# Patient Record
Sex: FEMALE | Race: BLACK OR AFRICAN AMERICAN | NOT HISPANIC OR LATINO | Employment: FULL TIME | URBAN - METROPOLITAN AREA
[De-identification: names, ages, dates, MRNs, and addresses within clinical notes are randomized per-mention and may not be internally consistent; named-entity substitution may affect disease eponyms.]

---
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
## 2023-06-14 ENCOUNTER — OFFICE VISIT (OUTPATIENT)
Dept: OBSTETRICS AND GYNECOLOGY | Facility: CLINIC | Age: 23
End: 2023-06-14
Payer: MEDICAID

## 2023-06-14 VITALS
SYSTOLIC BLOOD PRESSURE: 110 MMHG | WEIGHT: 145.06 LBS | BODY MASS INDEX: 26.53 KG/M2 | DIASTOLIC BLOOD PRESSURE: 78 MMHG

## 2023-06-14 DIAGNOSIS — Z01.419 WELL WOMAN EXAM WITH ROUTINE GYNECOLOGICAL EXAM: Primary | ICD-10-CM

## 2023-06-14 DIAGNOSIS — Z12.4 ENCOUNTER FOR PAPANICOLAOU SMEAR FOR CERVICAL CANCER SCREENING: ICD-10-CM

## 2023-06-14 DIAGNOSIS — Z30.42 SURVEILLANCE FOR DEPO-PROVERA CONTRACEPTION: ICD-10-CM

## 2023-06-14 PROCEDURE — 88175 CYTOPATH C/V AUTO FLUID REDO: CPT | Performed by: OBSTETRICS & GYNECOLOGY

## 2023-06-14 PROCEDURE — 3008F BODY MASS INDEX DOCD: CPT | Mod: CPTII,,, | Performed by: OBSTETRICS & GYNECOLOGY

## 2023-06-14 PROCEDURE — 3074F PR MOST RECENT SYSTOLIC BLOOD PRESSURE < 130 MM HG: ICD-10-PCS | Mod: CPTII,,, | Performed by: OBSTETRICS & GYNECOLOGY

## 2023-06-14 PROCEDURE — 99211 OFF/OP EST MAY X REQ PHY/QHP: CPT | Mod: PBBFAC | Performed by: OBSTETRICS & GYNECOLOGY

## 2023-06-14 PROCEDURE — 99999 PR PBB SHADOW E&M-EST. PATIENT-LVL I: CPT | Mod: PBBFAC,,, | Performed by: OBSTETRICS & GYNECOLOGY

## 2023-06-14 PROCEDURE — 3074F SYST BP LT 130 MM HG: CPT | Mod: CPTII,,, | Performed by: OBSTETRICS & GYNECOLOGY

## 2023-06-14 PROCEDURE — 3008F PR BODY MASS INDEX (BMI) DOCUMENTED: ICD-10-PCS | Mod: CPTII,,, | Performed by: OBSTETRICS & GYNECOLOGY

## 2023-06-14 PROCEDURE — 99999 PR PBB SHADOW E&M-EST. PATIENT-LVL I: ICD-10-PCS | Mod: PBBFAC,,, | Performed by: OBSTETRICS & GYNECOLOGY

## 2023-06-14 PROCEDURE — 99395 PR PREVENTIVE VISIT,EST,18-39: ICD-10-PCS | Mod: S$PBB,,, | Performed by: OBSTETRICS & GYNECOLOGY

## 2023-06-14 PROCEDURE — 99395 PREV VISIT EST AGE 18-39: CPT | Mod: S$PBB,,, | Performed by: OBSTETRICS & GYNECOLOGY

## 2023-06-14 PROCEDURE — 3078F PR MOST RECENT DIASTOLIC BLOOD PRESSURE < 80 MM HG: ICD-10-PCS | Mod: CPTII,,, | Performed by: OBSTETRICS & GYNECOLOGY

## 2023-06-14 PROCEDURE — 3078F DIAST BP <80 MM HG: CPT | Mod: CPTII,,, | Performed by: OBSTETRICS & GYNECOLOGY

## 2023-06-14 RX ORDER — MEDROXYPROGESTERONE ACETATE 150 MG/ML
150 INJECTION, SUSPENSION INTRAMUSCULAR
Status: COMPLETED | OUTPATIENT
Start: 2023-06-15 | End: 2024-03-13

## 2023-06-14 NOTE — PROGRESS NOTES
SUBJECTIVE:   Rosemary Zee is a 22 y.o. female   for annual well woman exam. Patient's last menstrual period was 2023 (exact date)..      Contraception: depo provera since 2023, still getting period sometimes  Would like to continue  Last injection was 23 in [place]    In monogamous relationship, using condoms    Past Medical History:   Diagnosis Date    Diabetes mellitus      No past surgical history on file.  Social History     Socioeconomic History    Marital status: Single   Tobacco Use    Smoking status: Passive Smoke Exposure - Never Smoker    Smokeless tobacco: Never   Substance and Sexual Activity    Alcohol use: No    Drug use: No    Sexual activity: Yes   Social History Narrative    Lives at home with mom and mom's boyfriend. Has siblings that do not live with her, but they are healthy per report.    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     Family History   Problem Relation Age of Onset    Diabetes Mother     Stroke Mother     Heart attacks under age 50 Mother     Diabetes Maternal Grandfather     Diabetes Paternal Grandmother     Arrhythmia Neg Hx     Early death Neg Hx     Pacemaker/defibrilator Neg Hx     Congenital heart disease Neg Hx      OB History    Para Term  AB Living   0 0 0 0 0     SAB IAB Ectopic Multiple Live Births   0 0 0       Obstetric Comments   Gynhx: reg/7   Sexually active @ age 20, one LTP         Current Outpatient Medications   Medication Sig Dispense Refill    acetone, urine, test (KETOSTIX) Strp CHECK URINE KETONES WHEN BLOOD SUGAR IS GREATER THAN 300 (Patient not taking: Reported on 3/31/2021) 100 strip 30    alcohol swabs PadM Use as directed prior to insulin injection or blood glucose check (Patient not taking: Reported on 3/31/2021) 300 each 6    BAQSIMI 3 mg/actuation Spry 3 mg by Nasal route as needed (unconscious and low blood sugar or having a seizure). (Patient not taking: Reported on 3/31/2021)  2 each 2    blood sugar diagnostic (ACCU-CHEK GUIDE TEST STRIPS) Strp Check Bg 8 times/day (Patient not taking: Reported on 3/31/2021) 250 strip 4    blood-glucose meter (ACCU-CHEK GUIDE GLUCOSE METER) Misc Use as directed (Patient not taking: Reported on 3/31/2021) 1 each 1    blood-glucose sensor (DEXCOM G6 SENSOR) Marlen For use with continuous glucose monitoring system.  Change sensor every 10 days or as needed if sensor fails or comes off. (Patient not taking: Reported on 3/31/2021) 3 each 12    blood-glucose transmitter (DEXCOM G6 TRANSMITTER) Marlen Use with Dexcom sensor for continuous glucose monitoring.  Change as indicated when battery life ends - up to 90 days. (Patient not taking: Reported on 3/31/2021) 2 Device 4    blood-glucose transmitter (DEXCOM G6 TRANSMITTER) Marlen For use with continuous glucose monitoring system.  Change at the end of battery life; last up to 90 days. (Patient not taking: Reported on 3/31/2021) 2 each 4    fluconazole (DIFLUCAN) 150 MG Tab Take one tablet once, may repeat in 3 days if no improvement 2 tablet 0    FLUoxetine (PROZAC) 10 MG capsule Take 1 capsule (10 mg total) by mouth once daily. 30 capsule 11    glucose 4 GM chewable tablet Take 4 tablets (16 g total) by mouth as needed for Low blood sugar. 150 tablet 4    ibuprofen (ADVIL,MOTRIN) 600 MG tablet Take 1 tablet (600 mg total) by mouth every 6 (six) hours as needed for Pain. (Patient not taking: Reported on 10/20/2021) 20 tablet 0    insulin aspart U-100 (NOVOLOG U-100 INSULIN ASPART) 100 unit/mL injection Use in insulin pump in divided doses, fill pump with 300 units every 3 days 30 mL 4    insulin degludec (TRESIBA FLEXTOUCH U-100) 100 unit/mL (3 mL) InPn Starting dose 16 units daily under the skin;titrate as directed by physician (Patient not taking: Reported on 10/20/2021) 15 mL 4    lancets (ACCU-CHEK FASTCLIX LANCING DEV) Misc Use as directed to test blood glucose up to 6 times a day (Patient not taking: Reported  "on 3/31/2021) 200 each 4    pen needle, diabetic (BD ULTRA-FINE SHIRLEY PEN NEEDLE) 32 gauge x 5/32" Ndle USE UP TO 7 TIMES PER DAY (Patient not taking: Reported on 3/31/2021) 200 each 4    urine glucose-ketones test (KETO-DIASTIX) Strp Check urine ketones when BG>300 (Patient not taking: Reported on 3/31/2021) 100 strip 3     No current facility-administered medications for this visit.     Allergies: Patient has no known allergies.       ROS:  GENERAL: Denies weight gain or weight loss. Feeling well overall.   SKIN: Denies rash or lesions.   HEAD: Denies head injury or headache.   NODES: Denies enlarged lymph nodes.   CHEST: Denies chest pain or shortness of breath.   CARDIOVASCULAR: Denies palpitations or left sided chest pain.   ABDOMEN: No abdominal pain, constipation, diarrhea, nausea, vomiting or rectal bleeding.   URINARY: No frequency, dysuria, hematuria, or burning on urination.  REPRODUCTIVE: Denies vaginal discharge, abnormal vaginal bleeding, lesions, pelvic pain  BREASTS: The patient performs breast self-examination and denies pain, lumps, or nipple discharge.   HEMATOLOGIC: No easy bruisability or excessive bleeding.  MUSCULOSKELETAL: Denies joint pain or swelling.   NEUROLOGIC: Denies syncope or weakness.   PSYCHIATRIC: Denies depression, anxiety or mood swings.      OBJECTIVE:   /78   Wt 65.8 kg (145 lb 1 oz)   LMP 06/07/2023 (Exact Date)   BMI 26.53 kg/m²   The patient appears well, alert, oriented x 3, in no distress.  PSYCH:  Normal, full range of affect  NECK: negative, no thyromegaly, trachea midline  SKIN: normal, good color, good turgor and no acne, striae, hirsutism  BREAST EXAM: breasts appear normal, no suspicious masses, no skin or nipple changes or axillary nodes  ABDOMEN: soft, non-tender; bowel sounds normal; no masses,  no organomegaly and no hernias, masses, or hepatosplenomegaly  GENITALIA: normal external genitalia, no erythema, no discharge  BLADDER: soft  URETHRA: normal " appearing urethra with no masses, tenderness or lesions and normal urethra, normal urethral meatus  VAGINA: Normal  CERVIX: no lesions or cervical motion tenderness  UTERUS: normal size, contour, position, consistency, mobility, non-tender  ADNEXA: no mass, fullness, tenderness      ASSESSMENT:   1. Health maintenance  -pap done. Discussed ASCCP guideline screening every 3 - 5 years.   -declined std testing  -counseled on exercise and healthy diet,   -bone health:  Discussed Vitamin D and Calcium supplementation, weight bearing exercises  2.  Discussed safety at home/school/work, healthy and balanced diet, sleep hygiene, as well as violence/weapons exposure.   3.  Next depo provera scheduled in 2 weeks

## 2023-06-19 ENCOUNTER — TELEPHONE (OUTPATIENT)
Dept: OBSTETRICS AND GYNECOLOGY | Facility: CLINIC | Age: 23
End: 2023-06-19
Payer: COMMERCIAL

## 2023-06-19 NOTE — TELEPHONE ENCOUNTER
Left MessageCommunicated - LVM for pt to call and reschedule her injection as i will be out of the office that day she needs to be scheduled on 6/28/2023 as this is her 12 weeks thank you

## 2023-06-27 ENCOUNTER — CLINICAL SUPPORT (OUTPATIENT)
Dept: OBSTETRICS AND GYNECOLOGY | Facility: CLINIC | Age: 23
End: 2023-06-27
Payer: COMMERCIAL

## 2023-06-27 VITALS — BODY MASS INDEX: 25.63 KG/M2 | WEIGHT: 140.13 LBS

## 2023-06-27 DIAGNOSIS — Z30.42 ENCOUNTER FOR MANAGEMENT AND INJECTION OF DEPO-PROVERA: Primary | ICD-10-CM

## 2023-06-27 PROCEDURE — 99999 PR PBB SHADOW E&M-EST. PATIENT-LVL III: CPT | Mod: PBBFAC,,,

## 2023-06-27 PROCEDURE — 99999 PR PBB SHADOW E&M-EST. PATIENT-LVL III: ICD-10-PCS | Mod: PBBFAC,,,

## 2023-06-27 PROCEDURE — 96372 THER/PROPH/DIAG INJ SC/IM: CPT | Mod: PBBFAC

## 2023-06-27 RX ADMIN — MEDROXYPROGESTERONE ACETATE 150 MG: 150 INJECTION, SUSPENSION INTRAMUSCULAR at 08:06

## 2023-06-27 NOTE — PROGRESS NOTES
Depo-provera injection administered without difficutly. Pt instructed to sit in waiting room for 15 minutes to monitor for s/s of adverse reaction. Next appointment made, stressed importance of staying within chacho period. Pt verb understanding.

## 2023-09-20 ENCOUNTER — HOSPITAL ENCOUNTER (EMERGENCY)
Facility: HOSPITAL | Age: 23
Discharge: HOME | End: 2023-09-20
Attending: EMERGENCY MEDICINE
Payer: MEDICAID

## 2023-09-20 VITALS
SYSTOLIC BLOOD PRESSURE: 123 MMHG | WEIGHT: 155 LBS | HEIGHT: 70 IN | HEART RATE: 80 BPM | DIASTOLIC BLOOD PRESSURE: 81 MMHG | TEMPERATURE: 98 F | RESPIRATION RATE: 18 BRPM | BODY MASS INDEX: 22.19 KG/M2 | OXYGEN SATURATION: 100 %

## 2023-09-20 DIAGNOSIS — R09.1 PLEURISY: Primary | ICD-10-CM

## 2023-09-20 DIAGNOSIS — R07.9 CHEST PAIN: ICD-10-CM

## 2023-09-20 DIAGNOSIS — E10.65 TYPE 1 DIABETES MELLITUS WITH HYPERGLYCEMIA: ICD-10-CM

## 2023-09-20 LAB
ALBUMIN SERPL BCP-MCNC: 4.1 G/DL (ref 3.5–5.2)
ALP SERPL-CCNC: 73 U/L (ref 55–135)
ALT SERPL W/O P-5'-P-CCNC: 14 U/L (ref 10–44)
ANION GAP SERPL CALC-SCNC: 10 MMOL/L (ref 8–16)
AST SERPL-CCNC: 13 U/L (ref 10–40)
BASOPHILS # BLD AUTO: 0.02 K/UL (ref 0–0.2)
BASOPHILS NFR BLD: 0.4 % (ref 0–1.9)
BILIRUB SERPL-MCNC: 0.3 MG/DL (ref 0.1–1)
BNP SERPL-MCNC: 12 PG/ML (ref 0–99)
BUN SERPL-MCNC: 9 MG/DL (ref 6–20)
CALCIUM SERPL-MCNC: 9.9 MG/DL (ref 8.7–10.5)
CHLORIDE SERPL-SCNC: 101 MMOL/L (ref 95–110)
CO2 SERPL-SCNC: 22 MMOL/L (ref 23–29)
CREAT SERPL-MCNC: 1.1 MG/DL (ref 0.5–1.4)
D DIMER PPP IA.FEU-MCNC: <0.19 MG/L FEU
DIFFERENTIAL METHOD: ABNORMAL
EOSINOPHIL # BLD AUTO: 0.1 K/UL (ref 0–0.5)
EOSINOPHIL NFR BLD: 2.2 % (ref 0–8)
ERYTHROCYTE [DISTWIDTH] IN BLOOD BY AUTOMATED COUNT: 11.8 % (ref 11.5–14.5)
EST. GFR  (NO RACE VARIABLE): >60 ML/MIN/1.73 M^2
GLUCOSE SERPL-MCNC: 494 MG/DL (ref 70–110)
HCT VFR BLD AUTO: 36.3 % (ref 37–48.5)
HGB BLD-MCNC: 12.6 G/DL (ref 12–16)
IMM GRANULOCYTES # BLD AUTO: 0 K/UL (ref 0–0.04)
IMM GRANULOCYTES NFR BLD AUTO: 0 % (ref 0–0.5)
LYMPHOCYTES # BLD AUTO: 2 K/UL (ref 1–4.8)
LYMPHOCYTES NFR BLD: 40.2 % (ref 18–48)
MCH RBC QN AUTO: 27.6 PG (ref 27–31)
MCHC RBC AUTO-ENTMCNC: 34.7 G/DL (ref 32–36)
MCV RBC AUTO: 80 FL (ref 82–98)
MONOCYTES # BLD AUTO: 0.3 K/UL (ref 0.3–1)
MONOCYTES NFR BLD: 5.9 % (ref 4–15)
NEUTROPHILS # BLD AUTO: 2.6 K/UL (ref 1.8–7.7)
NEUTROPHILS NFR BLD: 51.3 % (ref 38–73)
NRBC BLD-RTO: 0 /100 WBC
PLATELET # BLD AUTO: 288 K/UL (ref 150–450)
PMV BLD AUTO: 10.6 FL (ref 9.2–12.9)
POCT GLUCOSE: 335 MG/DL (ref 70–110)
POTASSIUM SERPL-SCNC: 4.2 MMOL/L (ref 3.5–5.1)
PROT SERPL-MCNC: 7.6 G/DL (ref 6–8.4)
RBC # BLD AUTO: 4.56 M/UL (ref 4–5.4)
SODIUM SERPL-SCNC: 133 MMOL/L (ref 136–145)
TROPONIN I SERPL DL<=0.01 NG/ML-MCNC: <0.006 NG/ML (ref 0–0.03)
WBC # BLD AUTO: 5.08 K/UL (ref 3.9–12.7)

## 2023-09-20 PROCEDURE — 93010 EKG 12-LEAD: ICD-10-PCS | Mod: ,,, | Performed by: INTERNAL MEDICINE

## 2023-09-20 PROCEDURE — 93005 ELECTROCARDIOGRAM TRACING: CPT

## 2023-09-20 PROCEDURE — 80053 COMPREHEN METABOLIC PANEL: CPT | Performed by: NURSE PRACTITIONER

## 2023-09-20 PROCEDURE — 84484 ASSAY OF TROPONIN QUANT: CPT | Performed by: NURSE PRACTITIONER

## 2023-09-20 PROCEDURE — 93010 ELECTROCARDIOGRAM REPORT: CPT | Mod: ,,, | Performed by: INTERNAL MEDICINE

## 2023-09-20 PROCEDURE — 99285 EMERGENCY DEPT VISIT HI MDM: CPT | Mod: 25

## 2023-09-20 PROCEDURE — 85025 COMPLETE CBC W/AUTO DIFF WBC: CPT | Performed by: NURSE PRACTITIONER

## 2023-09-20 PROCEDURE — 83880 ASSAY OF NATRIURETIC PEPTIDE: CPT | Performed by: NURSE PRACTITIONER

## 2023-09-20 PROCEDURE — 96374 THER/PROPH/DIAG INJ IV PUSH: CPT

## 2023-09-20 PROCEDURE — 25000003 PHARM REV CODE 250: Performed by: NURSE PRACTITIONER

## 2023-09-20 PROCEDURE — 63600175 PHARM REV CODE 636 W HCPCS: Performed by: NURSE PRACTITIONER

## 2023-09-20 PROCEDURE — 85379 FIBRIN DEGRADATION QUANT: CPT | Performed by: NURSE PRACTITIONER

## 2023-09-20 PROCEDURE — 82962 GLUCOSE BLOOD TEST: CPT

## 2023-09-20 RX ORDER — KETOROLAC TROMETHAMINE 30 MG/ML
15 INJECTION, SOLUTION INTRAMUSCULAR; INTRAVENOUS
Status: COMPLETED | OUTPATIENT
Start: 2023-09-20 | End: 2023-09-20

## 2023-09-20 RX ORDER — ORPHENADRINE CITRATE 30 MG/ML
30 INJECTION INTRAMUSCULAR; INTRAVENOUS
Status: DISCONTINUED | OUTPATIENT
Start: 2023-09-20 | End: 2023-09-20 | Stop reason: HOSPADM

## 2023-09-20 RX ORDER — KETOROLAC TROMETHAMINE 10 MG/1
10 TABLET, FILM COATED ORAL EVERY 6 HOURS
Qty: 20 TABLET | Refills: 0 | Status: SHIPPED | OUTPATIENT
Start: 2023-09-20 | End: 2023-09-25

## 2023-09-20 RX ADMIN — KETOROLAC TROMETHAMINE 15 MG: 30 INJECTION, SOLUTION INTRAMUSCULAR; INTRAVENOUS at 04:09

## 2023-09-20 RX ADMIN — SODIUM CHLORIDE 1000 ML: 9 INJECTION, SOLUTION INTRAVENOUS at 04:09

## 2023-09-20 NOTE — DISCHARGE INSTRUCTIONS
You were seen and evaluated in the ER today.  Your workup while you were here is reassuring for no acute causes of your symptoms.  We are going to treat you with medications help with pain.  We have also placed a referral for endocrinology follow-up.  Please call and schedule an appointment.  Please follow-up with your PCP as needed.  Please return to the ED for any worsening symptoms such as chest pain, shortness of breath, fever not controlled with Tylenol or ibuprofen or uncontrolled pain.      Our goal in the emergency department is to always give you outstanding care and exceptional service. You may receive a survey by mail or e-mail in the next week regarding your experience in our ED. We would greatly appreciate your completing and returning the survey. Your feedback provides us with a way to recognize our staff who give very good care and it helps us learn how to improve when your experience was below our aspiration of excellence.

## 2023-09-20 NOTE — ED PROVIDER NOTES
"Source of History:  Patient, chart    Chief complaint:  Chest Pain (Pt reports intermittent chest pain radiation to back, that started earlier today at work. Pain 7/10. Worse on exertion)      HPI:  Rosemary Zee is a 23 y.o. female with medical history of diabetes presenting with of chest pain that began late last night.  Patient describes the pain as a stabbing pain that radiates from her chest to her back.  Patient states pain is worse with exertion but not reproducible.  Patient also was noncompliant with her diabetes.  Patient does not check sugars and is unsure the last time she used any insulin.  Patient states I need a new doctor.  Patient denies any fever, chills or sick contacts.  Patient does work at [employer].    This is the extent to the patients complaints today here in the emergency department.    ROS: As per HPI and below:    Constitutional: No fever.  No chills.  Eyes: No visual changes.  ENT: No sore throat. No ear pain    Cardiovascular:  Positive for chest pain.  Respiratory: No shortness of breath.  GI: No abdominal pain.  No nausea or vomiting.  Genitourinary: No abnormal urination.  Neurologic: No headache. No focal weakness.  No numbness.  MSK: no back pain.  Integument: No rashes or lesions.  Hematologic: No easy bruising.  Endocrine: No excessive thirst or urination.    Review of patient's allergies indicates:  No Known Allergies    PMH:  As per HPI and below:  Past Medical History:   Diagnosis Date    Diabetes mellitus      No past surgical history on file.    Social History     Tobacco Use    Smoking status: Passive Smoke Exposure - Never Smoker    Smokeless tobacco: Never   Substance Use Topics    Alcohol use: No    Drug use: No       Physical Exam:    /74   Pulse 89   Temp 98 °F (36.7 °C) (Oral)   Resp 18   Ht 5' 10" (1.778 m)   Wt 70.3 kg (155 lb)   LMP 09/11/2023   SpO2 99%   Breastfeeding No   BMI 22.24 kg/m²     Nursing note and vital signs " reviewed.    Constitutional: No acute distress.  Nontoxic  Eyes: No conjunctival injection.Extraocular muscles are intact.  ENT: Oropharynx clear.  Normal phonation.   Cardiovascular: Regular rate and rhythm.  No murmurs. No gallops. No rubs  Respiratory:  Pleuritic chest pain on inspiration.  No chest wall pain to palpation.  Clear to auscultation bilaterally.  Good air movement.  No wheezes.  No rhonchi. No rales. No accessory muscle use..  Abdomen: Soft.  Not distended.  Nontender.  No guarding.  No rebound. Non-peritoneal.  Musculoskeletal: Good range of motion all joints.  No deformities.  Neck supple.  No meningismus.  Skin: No rashes seen.  Good turgor.  No abrasions.  No ecchymoses.  Neurologic: Motor intact.  Sensation intact.  No ataxia. No focal neurological deficits.  Psych: Appropriate, conversant    MDM    Emergent evaluation of a 22 yo female presenting for chest pain radiating to her back since last night.  Patient denies taking anything for his symptoms.  Of note patient outside facility and had a normal EKG and a clear chest x-ray.  Patient states pain has continued throughout the day.  Patient is also diabetic but does not check her sugars.  Patient is unsure the last time she used any insulin.  On exam pt is A&Ox3. VSS. Nonfebrile and nontoxic appearing.  Mucous membranes pink and moist. Tonsils with no redness, erythema or exudates. Breath sounds clear bilaterally.  Pleuritic chest pain on inspiration.  No chest wall pain to palpation.  Abdomen soft and nontender. No rebound or guarding appreciated on exam.   BS WNL.  Pt speaking in full sentences.  Steady gait appreciated. Cap refill < 3 seconds.      History Acquisition   Additional history was acquired from other historians.  Chart    The patient's list of active medical problems, social history, medications, and allergies as documented per RN staff has been reviewed.     Differential Diagnoses   Based on available information and the initial  assessment, the working differential diagnoses considered during this evaluation include but are not limited to pleurisy, costochondritis, viral URI, hyperglycemia, DKA, others.  I considered but do not suspect pulmonary embolism.    I will get labs, hydrate, medicate and reassess.      LABS     Labs Reviewed   CBC W/ AUTO DIFFERENTIAL - Abnormal; Notable for the following components:       Result Value    Hematocrit 36.3 (*)     MCV 80 (*)     All other components within normal limits   COMPREHENSIVE METABOLIC PANEL - Abnormal; Notable for the following components:    Sodium 133 (*)     CO2 22 (*)     Glucose 494 (*)     All other components within normal limits    Narrative:        Glucose critical result(s) called and verbal readback obtained   from Maryjo Marlon by JCT3 09/20/2023 16:56   TROPONIN I   B-TYPE NATRIURETIC PEPTIDE   D DIMER, QUANTITATIVE   POCT GLUCOSE MONITORING CONTINUOUS         Imaging     Imaging Results              X-Ray Chest PA And Lateral (Final result)  Result time 09/20/23 16:36:40      Final result by Son Franklin MD (09/20/23 16:36:40)                   Impression:      No acute chest disease identified.      Electronically signed by: Son Franklin MD  Date:    09/20/2023  Time:    16:36               Narrative:    EXAMINATION:  XR CHEST PA AND LATERAL    CLINICAL HISTORY:  Chest pain, unspecified    TECHNIQUE:  PA and lateral views of the chest were performed.    COMPARISON:  09/19/2023.    FINDINGS:  The cardiac silhouette and superior mediastinal structures are unremarkable. Pulmonary vasculature is within normal limits. The lungs are well aerated and free of focal consolidations. There is no evidence for pneumothorax or pleural effusions. Bony structures are grossly intact.                                      A radiology report was available for my review at the time of the encounter.    EKG        Additional Consideration   All available testing was considered during the  course of this workup.  Comorbidities taken into consideration during the patient's evaluation and treatment include weight, age.    Social determinants of health were taken into consideration during development of our treatment plan.    Medications   orphenadrine injection 30 mg (30 mg Intravenous Not Given 9/20/23 1615)   ketorolac injection 15 mg (15 mg Intravenous Given 9/20/23 1646)   sodium chloride 0.9% bolus 1,000 mL 1,000 mL (1,000 mLs Intravenous New Bag 9/20/23 1621)      ED Course as of 09/20/23 1725   Wed Sep 20, 2023   1659 CBC unremarkable.  No leukocytosis noted.  H&H stable at 12.6 and 36.3.  Troponin negative.  BNP negative.  D-dimer within normal limits.  CMP shows glucose of 494.  Anion gap of 10.  No signs of DKA.   [RZ]   1720 Patient reassessed.  Patient states feeling much better after Toradol.  Patient updated on lab and imaging results.  Advised to follow up with endocrinology for control of her diabetes.  Advised to follow-up with PCP to establish care.  Strict return to ED precautions discussed.  Patient verbalized understanding of this plan of care.  All questions and concerns addressed. [RZ]   1724 Patient is hemodynamically stable, vital signs are normal. Discharge instructions given. Return to ED precautions discussed. Follow up as directed. Pt verbalized understanding of this plan.  Pt is stable for discharge.  [RZ]      ED Course User Index  [RZ] Lexie Eaton NP             CLINICAL IMPRESSION  1. Pleurisy    2. Chest pain    3. Type 1 diabetes mellitus with hyperglycemia         ED Disposition Condition    Discharge Stable            Instruction:  I see no indication of an emergent process beyond that addressed during our encounter but have duly counseled the patient/family regarding the need for prompt follow-up as well as the indications that should prompt immediate return to the emergency room should new or worrisome developments occur.  The patient/family has been provided  with verbal and printed direction regarding our final diagnosis(es) as well as instructions regarding use of OTC and/or Rx medications intended to manage the patient's aforementioned conditions including:  ED Prescriptions       Medication Sig Dispense Start Date End Date Auth. Provider    ketorolac (TORADOL) 10 mg tablet Take 1 tablet (10 mg total) by mouth every 6 (six) hours. for 5 days 20 tablet 9/20/2023 9/25/2023 Lexie Eaton NP          Patient has been advised of following recommended follow-up instructions:  Follow-up Information       Follow up With Specialties Details Why Contact Info Additional Information    [facility] - Endocrinology Endocrinology Schedule an appointment as soon as possible for a visit  As needed [address], [address] [address] [phone] [address] -  Schedule an appointment as soon as possible for a visit  for ED follow up [address] [place] [address]  [phone]             The patient/family communicates understanding of all this information and all remaining questions and concerns were addressed at this time.      The patient's condition did not warrant review of the  and prescription of controlled substances.      This note was created using dictation software.  This program may occasionally mistype words and phrases.         Lexie Eaton NP  09/20/23 8517

## 2023-09-26 ENCOUNTER — CLINICAL SUPPORT (OUTPATIENT)
Dept: OBSTETRICS AND GYNECOLOGY | Facility: CLINIC | Age: 23
End: 2023-09-26
Payer: MEDICAID

## 2023-09-26 DIAGNOSIS — Z30.42 ENCOUNTER FOR MANAGEMENT AND INJECTION OF DEPO-PROVERA: Primary | ICD-10-CM

## 2023-09-26 PROCEDURE — 96372 THER/PROPH/DIAG INJ SC/IM: CPT | Mod: PBBFAC

## 2023-09-26 PROCEDURE — 99999PBSHW PR PBB SHADOW TECHNICAL ONLY FILED TO HB: Mod: PBBFAC,,,

## 2023-09-26 PROCEDURE — 99999PBSHW PR PBB SHADOW TECHNICAL ONLY FILED TO HB: ICD-10-PCS | Mod: PBBFAC,,,

## 2023-09-26 RX ADMIN — MEDROXYPROGESTERONE ACETATE 150 MG: 150 INJECTION, SUSPENSION INTRAMUSCULAR at 09:09

## 2023-10-24 ENCOUNTER — HOSPITAL ENCOUNTER (EMERGENCY)
Facility: HOSPITAL | Age: 23
Discharge: LEFT BEFORE DISCHARGE | End: 2023-10-24
Attending: EMERGENCY MEDICINE
Payer: MEDICAID

## 2023-10-24 VITALS
OXYGEN SATURATION: 98 % | WEIGHT: 143 LBS | HEART RATE: 110 BPM | SYSTOLIC BLOOD PRESSURE: 112 MMHG | BODY MASS INDEX: 26.31 KG/M2 | RESPIRATION RATE: 16 BRPM | TEMPERATURE: 99 F | DIASTOLIC BLOOD PRESSURE: 65 MMHG | HEIGHT: 62 IN

## 2023-10-24 DIAGNOSIS — J30.9 ALLERGIC RHINITIS, UNSPECIFIED SEASONALITY, UNSPECIFIED TRIGGER: ICD-10-CM

## 2023-10-24 DIAGNOSIS — U07.1 COVID-19 VIRUS INFECTION: Primary | ICD-10-CM

## 2023-10-24 DIAGNOSIS — R73.9 HYPERGLYCEMIA: ICD-10-CM

## 2023-10-24 LAB
B-HCG UR QL: NEGATIVE
CTP QC/QA: YES
MOLECULAR STREP A: NEGATIVE
POC MOLECULAR INFLUENZA A AGN: NEGATIVE
POC MOLECULAR INFLUENZA B AGN: NEGATIVE
POCT GLUCOSE: 331 MG/DL (ref 70–110)
SARS-COV-2 RDRP RESP QL NAA+PROBE: POSITIVE

## 2023-10-24 PROCEDURE — 81025 URINE PREGNANCY TEST: CPT | Performed by: NURSE PRACTITIONER

## 2023-10-24 PROCEDURE — 99284 EMERGENCY DEPT VISIT MOD MDM: CPT

## 2023-10-24 PROCEDURE — 82962 GLUCOSE BLOOD TEST: CPT

## 2023-10-24 PROCEDURE — 87804 INFLUENZA ASSAY W/OPTIC: CPT

## 2023-10-24 PROCEDURE — 87635 SARS-COV-2 COVID-19 AMP PRB: CPT | Performed by: NURSE PRACTITIONER

## 2023-10-24 RX ORDER — BENZONATATE 100 MG/1
100 CAPSULE ORAL 3 TIMES DAILY PRN
Qty: 30 CAPSULE | Refills: 0 | Status: SHIPPED | OUTPATIENT
Start: 2023-10-24 | End: 2023-11-03

## 2023-10-24 RX ORDER — DEXTROMETHORPHAN HYDROBROMIDE, GUAIFENESIN 5; 100 MG/5ML; MG/5ML
650 LIQUID ORAL EVERY 8 HOURS
Qty: 20 TABLET | Refills: 0 | Status: SHIPPED | OUTPATIENT
Start: 2023-10-24 | End: 2023-10-31

## 2023-10-24 RX ORDER — FLUTICASONE PROPIONATE 50 MCG
1 SPRAY, SUSPENSION (ML) NASAL 2 TIMES DAILY PRN
Qty: 15 G | Refills: 0 | Status: SHIPPED | OUTPATIENT
Start: 2023-10-24 | End: 2023-11-07

## 2023-10-24 RX ORDER — IBUPROFEN 600 MG/1
600 TABLET ORAL EVERY 6 HOURS PRN
Qty: 20 TABLET | Refills: 0 | Status: SHIPPED | OUTPATIENT
Start: 2023-10-24 | End: 2023-10-29

## 2023-10-24 RX ORDER — LORATADINE 10 MG/1
10 TABLET ORAL DAILY
Qty: 30 TABLET | Refills: 0 | Status: SHIPPED | OUTPATIENT
Start: 2023-10-24 | End: 2024-02-01 | Stop reason: CLARIF

## 2023-10-24 NOTE — Clinical Note
"Rosemary Hernandez "Rosemary Zee was seen and treated in our emergency department on 10/24/2023.     COVID-19 is present in our communities across the state. There is limited testing for COVID at this time, so not all patients can be tested. In this situation, your employee meets the following criteria:    Rosemary Zee has met the criteria for COVID-19 testing and has a POSITIVE result. She can return to work once they are asymptomatic for 24 hours without the use of fever reducing medications AND at least five days from the first positive result. A mask is recommended for 5 days post quarantine.     If you have any questions or concerns, or if I can be of further assistance, please do not hesitate to contact me.    Sincerely,             Tashia Cervantes, VAHE"

## 2023-10-25 NOTE — ED PROVIDER NOTES
Encounter Date: 10/24/2023       History     Chief Complaint   Patient presents with    Sore Throat     PT with sore throat and body aches x 2 days.     23 y.o. female with a PMH of DM who presents to the Emergency Department with complaints of sore throat and body aches for 1 day.  She denies rash, fever, chest pain, SOB, numbness, weakness, tingling, abdominal pain, back pain, dysuria, hematuria, nausea, vomiting, diarrhea, or any other complaints.   She rates the pain as 6/10 and has not taken any medications for the symptoms.  No Alleviating/aggravating factors.                  The history is provided by the patient.     Review of patient's allergies indicates:  No Known Allergies  Past Medical History:   Diagnosis Date    Diabetes mellitus      History reviewed. No pertinent surgical history.  Family History   Problem Relation Age of Onset    Diabetes Mother     Stroke Mother     Heart attacks under age 50 Mother     Diabetes Maternal Grandfather     Diabetes Paternal Grandmother     Arrhythmia Neg Hx     Early death Neg Hx     Pacemaker/defibrilator Neg Hx     Congenital heart disease Neg Hx      Social History     Tobacco Use    Smoking status: Passive Smoke Exposure - Never Smoker    Smokeless tobacco: Never   Substance Use Topics    Alcohol use: No    Drug use: No     Review of Systems   Constitutional:  Negative for chills and fever.   HENT:  Positive for sore throat. Negative for congestion, ear pain, rhinorrhea and trouble swallowing.    Eyes:  Negative for pain, discharge and redness.   Respiratory:  Negative for cough and shortness of breath.    Cardiovascular:  Negative for chest pain.   Gastrointestinal:  Negative for abdominal pain, diarrhea, nausea and vomiting.   Genitourinary:  Negative for decreased urine volume and dysuria.   Musculoskeletal:  Positive for myalgias. Negative for back pain, neck pain and neck stiffness.   Skin:  Negative for rash.   Neurological:  Negative for dizziness,  weakness, light-headedness, numbness and headaches.   Psychiatric/Behavioral:  Negative for confusion.        Physical Exam     Initial Vitals [10/24/23 2024]   BP Pulse Resp Temp SpO2   112/65 110 16 98.8 °F (37.1 °C) 98 %      MAP       --         Physical Exam    Nursing note and vitals reviewed.  Constitutional: Vital signs are normal. She appears well-developed.  Non-toxic appearance. She does not appear ill.   HENT:   Head: Normocephalic and atraumatic.   Right Ear: Tympanic membrane and ear canal normal.   Left Ear: Tympanic membrane and ear canal normal.   Nose: Mucosal edema present.   Mouth/Throat: Uvula is midline, oropharynx is clear and moist and mucous membranes are normal.   Eyes: Conjunctivae are normal.   Neck: No Brudzinski's sign and no Kernig's sign noted.   Normal range of motion.  Cardiovascular:  Normal rate and regular rhythm.           Pulmonary/Chest: Effort normal and breath sounds normal. She exhibits no tenderness.   Abdominal: Abdomen is soft. There is no abdominal tenderness.   Musculoskeletal:      Cervical back: Normal range of motion.     Lymphadenopathy:     She has no cervical adenopathy.   Neurological: She is alert and oriented to person, place, and time. Gait normal. GCS eye subscore is 4. GCS verbal subscore is 5. GCS motor subscore is 6.   Skin: Skin is warm, dry and intact. No rash noted.   Psychiatric: She has a normal mood and affect. Her speech is normal and behavior is normal. Judgment and thought content normal.         ED Course   Procedures  Labs Reviewed   SARS-COV-2 RDRP GENE - Abnormal; Notable for the following components:       Result Value    POC Rapid COVID Positive (*)     All other components within normal limits   POCT GLUCOSE - Abnormal; Notable for the following components:    POCT Glucose 331 (*)     All other components within normal limits   POCT INFLUENZA A/B MOLECULAR   POCT STREP A MOLECULAR   POCT URINE PREGNANCY          Imaging Results    None           Medications - No data to display  Medical Decision Making  This is an urgent evaluation of a 23 y.o. female that presents to the Emergency Department for URI Symptoms for 1 day.  The patient is a non-toxic, afebrile, and well appearing female. On physical exam: Ears: without infection.  Pharynx without infection. Appears well hydrated with moist mucus membranes. Neck soft and supple with no meningeal signs or cervical lymphadenopathy.  Breath sounds are clear and equal bilaterally with no adventitious breath sounds, tachypnea or respiratory distress.  Oxygen saturation is 98% on Room Air, no evidence of hypoxia.     Vital Signs: 112/65, 98.8, 110, 16, 98%   If available, previous records reviewed.   I ordered labs and personally reviewed them.  Labs significant for UPT negative,   Flu: Negative  COVID-19: Positive; COVID Risk Score: 1   Strep: Negative    Patient refused treatment for elevated CBG; will go home and take her medications.  AMA form signed; risks of no treatment discussed with patient; questions answered    Given the above findings, my overall impression is COVID Infection, allergic rhinitis. Given the above findings, I do not think the patient has Flu, OM, OE, strep pharyngitis, meningitis, pneumonia, bacterial sinusitis, or significant dehydration requiring IV fluids or admission    The patient will be discharged home with tessalon perles, claritin, flonase. Additional home care recommendations include Tylenol/Ibuprofen, Hydration. The diagnosis, treatment plan, instructions for follow-up, strict return precautions, and reevaluation with her PCP as well as ED return precautions have been discussed with the patient and she has verbalized an understanding of the information.  All questions or concerns from the patient have been addressed.         Amount and/or Complexity of Data Reviewed  Labs: ordered. Decision-making details documented in ED Course.                                Clinical Impression:   Final diagnoses:  [U07.1] COVID-19 virus infection (Primary)  [R73.9] Hyperglycemia  [J30.9] Allergic rhinitis, unspecified seasonality, unspecified trigger        ED Disposition Condition    AMA Tashia Calderon, VA[facility]  10/24/23 0509

## 2023-10-25 NOTE — DISCHARGE INSTRUCTIONS

## 2023-10-25 NOTE — FIRST PROVIDER EVALUATION
"Medical screening examination initiated.  I have conducted a focused provider triage encounter, findings are as follows:    Brief history of present illness:  Sore throat and body aches for 2 days    Vitals:    10/24/23 2024   BP: 112/65   BP Location: Right arm   Pulse: 110   Resp: 16   Temp: 98.8 °F (37.1 °C)   TempSrc: Oral   SpO2: 98%   Weight: 64.9 kg (143 lb)   Height: 5' 2" (1.575 m)       Pertinent physical exam:  NAD    Brief workup plan:  COVID, Flu, Strep, UPT    Preliminary workup initiated; this workup will be continued and followed by the physician or advanced practice provider that is assigned to the patient when roomed.  "

## 2023-12-20 ENCOUNTER — CLINICAL SUPPORT (OUTPATIENT)
Dept: OBSTETRICS AND GYNECOLOGY | Facility: CLINIC | Age: 23
End: 2023-12-20
Payer: MEDICAID

## 2023-12-20 DIAGNOSIS — Z30.42 ENCOUNTER FOR MANAGEMENT AND INJECTION OF DEPO-PROVERA: Primary | ICD-10-CM

## 2023-12-20 PROCEDURE — 96372 THER/PROPH/DIAG INJ SC/IM: CPT | Mod: PBBFAC

## 2023-12-20 PROCEDURE — 99999PBSHW PR PBB SHADOW TECHNICAL ONLY FILED TO HB: Mod: PBBFAC,,,

## 2023-12-20 PROCEDURE — 99999PBSHW PR PBB SHADOW TECHNICAL ONLY FILED TO HB: ICD-10-PCS | Mod: PBBFAC,,,

## 2023-12-20 RX ADMIN — MEDROXYPROGESTERONE ACETATE 150 MG: 150 INJECTION, SUSPENSION INTRAMUSCULAR at 10:12

## 2024-01-29 ENCOUNTER — HOSPITAL ENCOUNTER (EMERGENCY)
Facility: HOSPITAL | Age: 24
Discharge: HOME | End: 2024-01-29
Attending: STUDENT IN AN ORGANIZED HEALTH CARE EDUCATION/TRAINING PROGRAM
Payer: MEDICAID

## 2024-01-29 VITALS
TEMPERATURE: 98 F | OXYGEN SATURATION: 100 % | HEART RATE: 96 BPM | SYSTOLIC BLOOD PRESSURE: 119 MMHG | RESPIRATION RATE: 20 BRPM | DIASTOLIC BLOOD PRESSURE: 75 MMHG | BODY MASS INDEX: 25.4 KG/M2 | HEIGHT: 62 IN | WEIGHT: 138 LBS

## 2024-01-29 DIAGNOSIS — E10.65 UNCONTROLLED TYPE 1 DIABETES MELLITUS WITH HYPERGLYCEMIA: Primary | ICD-10-CM

## 2024-01-29 DIAGNOSIS — R73.9 HYPERGLYCEMIA: ICD-10-CM

## 2024-01-29 DIAGNOSIS — N76.0 ACUTE VAGINITIS: ICD-10-CM

## 2024-01-29 LAB
ALBUMIN SERPL BCP-MCNC: 3.7 G/DL (ref 3.5–5.2)
ALLENS TEST: ABNORMAL
ALP SERPL-CCNC: 82 U/L (ref 55–135)
ALT SERPL W/O P-5'-P-CCNC: 14 U/L (ref 10–44)
ANION GAP SERPL CALC-SCNC: 12 MMOL/L (ref 8–16)
AST SERPL-CCNC: 13 U/L (ref 10–40)
B-HCG UR QL: NEGATIVE
B-OH-BUTYR BLD STRIP-SCNC: 1.6 MMOL/L (ref 0–0.5)
BACTERIA #/AREA URNS HPF: ABNORMAL /HPF
BACTERIA GENITAL QL WET PREP: ABNORMAL
BASOPHILS # BLD AUTO: 0.02 K/UL (ref 0–0.2)
BASOPHILS NFR BLD: 0.3 % (ref 0–1.9)
BILIRUB SERPL-MCNC: 0.4 MG/DL (ref 0.1–1)
BILIRUB UR QL STRIP: NEGATIVE
BUN SERPL-MCNC: 8 MG/DL (ref 6–20)
CALCIUM SERPL-MCNC: 8.6 MG/DL (ref 8.7–10.5)
CHLORIDE SERPL-SCNC: 101 MMOL/L (ref 95–110)
CLARITY UR: CLEAR
CLUE CELLS VAG QL WET PREP: ABNORMAL
CO2 SERPL-SCNC: 18 MMOL/L (ref 23–29)
COLOR UR: YELLOW
CREAT SERPL-MCNC: 1.1 MG/DL (ref 0.5–1.4)
CTP QC/QA: YES
DIFFERENTIAL METHOD BLD: ABNORMAL
EOSINOPHIL # BLD AUTO: 0 K/UL (ref 0–0.5)
EOSINOPHIL NFR BLD: 0.2 % (ref 0–8)
ERYTHROCYTE [DISTWIDTH] IN BLOOD BY AUTOMATED COUNT: 11.6 % (ref 11.5–14.5)
EST. GFR  (NO RACE VARIABLE): >60 ML/MIN/1.73 M^2
FILAMENT FUNGI VAG WET PREP-#/AREA: ABNORMAL
GLUCOSE SERPL-MCNC: 570 MG/DL (ref 70–110)
GLUCOSE UR QL STRIP: ABNORMAL
HCO3 UR-SCNC: 18.3 MMOL/L (ref 24–28)
HCT VFR BLD AUTO: 37.7 % (ref 37–48.5)
HGB BLD-MCNC: 12.7 G/DL (ref 12–16)
HGB UR QL STRIP: NEGATIVE
IMM GRANULOCYTES # BLD AUTO: 0.02 K/UL (ref 0–0.04)
IMM GRANULOCYTES NFR BLD AUTO: 0.3 % (ref 0–0.5)
KETONES UR QL STRIP: ABNORMAL
LEUKOCYTE ESTERASE UR QL STRIP: NEGATIVE
LYMPHOCYTES # BLD AUTO: 0.8 K/UL (ref 1–4.8)
LYMPHOCYTES NFR BLD: 12.9 % (ref 18–48)
MCH RBC QN AUTO: 27.5 PG (ref 27–31)
MCHC RBC AUTO-ENTMCNC: 33.7 G/DL (ref 32–36)
MCV RBC AUTO: 82 FL (ref 82–98)
MICROSCOPIC COMMENT: ABNORMAL
MONOCYTES # BLD AUTO: 0.5 K/UL (ref 0.3–1)
MONOCYTES NFR BLD: 8.1 % (ref 4–15)
NEUTROPHILS # BLD AUTO: 4.6 K/UL (ref 1.8–7.7)
NEUTROPHILS NFR BLD: 78.2 % (ref 38–73)
NITRITE UR QL STRIP: NEGATIVE
NRBC BLD-RTO: 0 /100 WBC
PCO2 BLDA: 36.9 MMHG (ref 35–45)
PH SMN: 7.3 [PH] (ref 7.35–7.45)
PH UR STRIP: 6 [PH] (ref 5–8)
PLATELET # BLD AUTO: 267 K/UL (ref 150–450)
PMV BLD AUTO: 10.5 FL (ref 9.2–12.9)
PO2 BLDA: 40 MMHG (ref 40–60)
POC BE: -7 MMOL/L
POC SATURATED O2: 70 % (ref 95–100)
POC TCO2: 19 MMOL/L (ref 24–29)
POCT GLUCOSE: 293 MG/DL (ref 70–110)
POCT GLUCOSE: 461 MG/DL (ref 70–110)
POTASSIUM SERPL-SCNC: 4.1 MMOL/L (ref 3.5–5.1)
PROT SERPL-MCNC: 7.3 G/DL (ref 6–8.4)
PROT UR QL STRIP: ABNORMAL
RBC # BLD AUTO: 4.61 M/UL (ref 4–5.4)
RBC #/AREA URNS HPF: 2 /HPF (ref 0–4)
SAMPLE: ABNORMAL
SITE: ABNORMAL
SODIUM SERPL-SCNC: 131 MMOL/L (ref 136–145)
SP GR UR STRIP: >1.03 (ref 1–1.03)
SPECIMEN SOURCE: ABNORMAL
SQUAMOUS #/AREA URNS HPF: 1 /HPF
T VAGINALIS GENITAL QL WET PREP: ABNORMAL
URN SPEC COLLECT METH UR: ABNORMAL
UROBILINOGEN UR STRIP-ACNC: NEGATIVE EU/DL
WBC # BLD AUTO: 5.9 K/UL (ref 3.9–12.7)
WBC #/AREA URNS HPF: 6 /HPF (ref 0–5)
WBC #/AREA VAG WET PREP: ABNORMAL
YEAST GENITAL QL WET PREP: ABNORMAL
YEAST URNS QL MICRO: ABNORMAL

## 2024-01-29 PROCEDURE — 82962 GLUCOSE BLOOD TEST: CPT

## 2024-01-29 PROCEDURE — 85025 COMPLETE CBC W/AUTO DIFF WBC: CPT

## 2024-01-29 PROCEDURE — 81025 URINE PREGNANCY TEST: CPT | Performed by: NURSE PRACTITIONER

## 2024-01-29 PROCEDURE — 63700000 PHARM REV CODE 250 ALT 637 W/O HCPCS

## 2024-01-29 PROCEDURE — 87491 CHLMYD TRACH DNA AMP PROBE: CPT | Performed by: STUDENT IN AN ORGANIZED HEALTH CARE EDUCATION/TRAINING PROGRAM

## 2024-01-29 PROCEDURE — 63600175 PHARM REV CODE 636 W HCPCS: Performed by: STUDENT IN AN ORGANIZED HEALTH CARE EDUCATION/TRAINING PROGRAM

## 2024-01-29 PROCEDURE — 82010 KETONE BODYS QUAN: CPT

## 2024-01-29 PROCEDURE — 25000003 PHARM REV CODE 250

## 2024-01-29 PROCEDURE — 96374 THER/PROPH/DIAG INJ IV PUSH: CPT

## 2024-01-29 PROCEDURE — 63600175 PHARM REV CODE 636 W HCPCS

## 2024-01-29 PROCEDURE — 80053 COMPREHEN METABOLIC PANEL: CPT

## 2024-01-29 PROCEDURE — 93010 ELECTROCARDIOGRAM REPORT: CPT | Mod: ,,, | Performed by: INTERNAL MEDICINE

## 2024-01-29 PROCEDURE — 99900035 HC TECH TIME PER 15 MIN (STAT)

## 2024-01-29 PROCEDURE — 87210 SMEAR WET MOUNT SALINE/INK: CPT | Performed by: STUDENT IN AN ORGANIZED HEALTH CARE EDUCATION/TRAINING PROGRAM

## 2024-01-29 PROCEDURE — 25000003 PHARM REV CODE 250: Performed by: STUDENT IN AN ORGANIZED HEALTH CARE EDUCATION/TRAINING PROGRAM

## 2024-01-29 PROCEDURE — 82803 BLOOD GASES ANY COMBINATION: CPT

## 2024-01-29 PROCEDURE — 99285 EMERGENCY DEPT VISIT HI MDM: CPT | Mod: 25

## 2024-01-29 PROCEDURE — 96372 THER/PROPH/DIAG INJ SC/IM: CPT | Mod: 59

## 2024-01-29 PROCEDURE — 93005 ELECTROCARDIOGRAM TRACING: CPT

## 2024-01-29 PROCEDURE — 96361 HYDRATE IV INFUSION ADD-ON: CPT

## 2024-01-29 PROCEDURE — 81000 URINALYSIS NONAUTO W/SCOPE: CPT | Performed by: NURSE PRACTITIONER

## 2024-01-29 RX ORDER — AZITHROMYCIN 250 MG/1
1000 TABLET, FILM COATED ORAL
Status: COMPLETED | OUTPATIENT
Start: 2024-01-29 | End: 2024-01-29

## 2024-01-29 RX ORDER — ACETAMINOPHEN 325 MG/1
650 TABLET ORAL
Status: COMPLETED | OUTPATIENT
Start: 2024-01-29 | End: 2024-01-29

## 2024-01-29 RX ORDER — CEFTRIAXONE 500 MG/1
500 INJECTION, POWDER, FOR SOLUTION INTRAMUSCULAR; INTRAVENOUS
Status: COMPLETED | OUTPATIENT
Start: 2024-01-29 | End: 2024-01-29

## 2024-01-29 RX ADMIN — SODIUM CHLORIDE 1000 ML: 9 INJECTION, SOLUTION INTRAVENOUS at 03:01

## 2024-01-29 RX ADMIN — CEFTRIAXONE SODIUM 500 MG: 500 INJECTION, POWDER, FOR SOLUTION INTRAMUSCULAR; INTRAVENOUS at 04:01

## 2024-01-29 RX ADMIN — INSULIN HUMAN 5 UNITS: 100 INJECTION, SOLUTION PARENTERAL at 03:01

## 2024-01-29 RX ADMIN — AZITHROMYCIN DIHYDRATE 1000 MG: 250 TABLET ORAL at 04:01

## 2024-01-29 RX ADMIN — POTASSIUM BICARBONATE 40 MEQ: 391 TABLET, EFFERVESCENT ORAL at 03:01

## 2024-01-29 RX ADMIN — ACETAMINOPHEN 650 MG: 325 TABLET ORAL at 01:01

## 2024-01-29 RX ADMIN — SODIUM CHLORIDE 1000 ML: 9 INJECTION, SOLUTION INTRAVENOUS at 01:01

## 2024-01-29 NOTE — Clinical Note
"Rosemary Hernandez "Rosemary Hernandez" Yayo was seen and treated in our emergency department on 1/29/2024.  She may return to school on 01/30/2024.      If you have any questions or concerns, please don't hesitate to call.      Polo Nance, BENNETTN RN"

## 2024-01-29 NOTE — ED NOTES
Pt reporting vaginal pain/irritation and white vaginal discharge since last night. Dr. Freeman and Dr. Sagastume notified.

## 2024-01-29 NOTE — DISCHARGE INSTRUCTIONS
Please follow up with the primary care provider regarding your insulin prescription.  Please return to the emergency department if you develop any chest pain, shortness of breath, fever, chills, or other symptoms that cause you concern.

## 2024-01-29 NOTE — ED PROVIDER NOTES
Encounter Date: 1/29/2024       History     Chief Complaint   Patient presents with    Urinary Tract Infection     Pt co of painful urination x 1 day. States she is going to the bathroom often, and not much is coming out. Denies any concern for STI, fevers, or back pain. HX of Diabetes,      23-year-old female with a medical history significant for type 1 diabetes presents to the emergency department with urinary symptoms.  The patient has been noticing an increase urge to urinate with less output over the last day.  She denies any history of blood in the urine vaginal discharge or kidney stones.  She has been afebrile denies fever, chills, chest pain, shortness of breath, nausea or vomiting, abdominal pain, or any other symptoms at this time.  The patient has endorsed not using insulin for several months.  She does not currently have a primary care doctor that is prescribing it for her..  She would just said that she has not used it and does not know why.  The patient has no history of diabetic ketoacidosis, but has had urinary tract infections in the past        Review of patient's allergies indicates:  No Known Allergies  Past Medical History:   Diagnosis Date    Diabetes mellitus      History reviewed. No pertinent surgical history.  Family History   Problem Relation Age of Onset    Diabetes Mother     Stroke Mother     Heart attacks under age 50 Mother     Diabetes Maternal Grandfather     Diabetes Paternal Grandmother     Arrhythmia Neg Hx     Early death Neg Hx     Pacemaker/defibrilator Neg Hx     Congenital heart disease Neg Hx      Social History     Tobacco Use    Smoking status: Passive Smoke Exposure - Never Smoker    Smokeless tobacco: Never   Substance Use Topics    Alcohol use: No    Drug use: No     Review of Systems    Physical Exam     Initial Vitals [01/29/24 1241]   BP Pulse Resp Temp SpO2   (!) 141/83 105 18 97.5 °F (36.4 °C) 100 %      MAP       --         Physical  Exam    Constitutional: She appears well-developed and well-nourished. She is not diaphoretic. No distress.   HENT:   Head: Normocephalic and atraumatic.   Eyes: EOM are normal. Pupils are equal, round, and reactive to light.   Neck: Neck supple.   Normal range of motion.  Cardiovascular:  Normal rate, normal heart sounds and intact distal pulses.           Pulmonary/Chest: Breath sounds normal. No respiratory distress.   Abdominal: Abdomen is soft. She exhibits no distension. There is no abdominal tenderness. There is no rebound and no guarding.   Musculoskeletal:         General: No tenderness or edema. Normal range of motion.      Cervical back: Normal range of motion and neck supple.     Neurological: She is alert and oriented to person, place, and time. She has normal strength. GCS score is 15. GCS eye subscore is 4. GCS verbal subscore is 5. GCS motor subscore is 6.   Skin: Skin is warm and dry.   Psychiatric: She has a normal mood and affect. Her behavior is normal. Judgment and thought content normal.         ED Course   Procedures  Labs Reviewed   VAGINAL SCREEN - Abnormal; Notable for the following components:       Result Value    WBC - Vaginal Screen Moderate (*)     Bacteria - Vaginal Screen Many (*)     All other components within normal limits    Narrative:     Release to patient->Immediate   URINALYSIS, REFLEX TO URINE CULTURE - Abnormal; Notable for the following components:    Specific Gravity, UA >1.030 (*)     Protein, UA Trace (*)     Glucose, UA 4+ (*)     Ketones, UA 3+ (*)     All other components within normal limits    Narrative:     Specimen Source->Urine   CBC W/ AUTO DIFFERENTIAL - Abnormal; Notable for the following components:    Lymph # 0.8 (*)     Gran % 78.2 (*)     Lymph % 12.9 (*)     All other components within normal limits   COMPREHENSIVE METABOLIC PANEL - Abnormal; Notable for the following components:    Sodium 131 (*)     CO2 18 (*)     Glucose 570 (*)     Calcium 8.6 (*)      All other components within normal limits    Narrative:     GLUCOSE critical result(s) called and verbal readback obtained from   DR. KAY  by [facility] 01/29/2024 14:06   BETA - HYDROXYBUTYRATE, SERUM - Abnormal; Notable for the following components:    Beta-Hydroxybutyrate 1.6 (*)     All other components within normal limits   URINALYSIS MICROSCOPIC - Abnormal; Notable for the following components:    WBC, UA 6 (*)     Yeast, UA Rare (*)     All other components within normal limits    Narrative:     Specimen Source->Urine   ISTAT PROCEDURE - Abnormal; Notable for the following components:    POC PH 7.304 (*)     POC HCO3 18.3 (*)     POC BE -7 (*)     POC TCO2 19 (*)     All other components within normal limits   POCT GLUCOSE - Abnormal; Notable for the following components:    POCT Glucose 461 (*)     All other components within normal limits   POCT GLUCOSE - Abnormal; Notable for the following components:    POCT Glucose 293 (*)     All other components within normal limits   C. TRACHOMATIS/N. GONORRHOEAE BY AMP DNA   POCT URINE PREGNANCY   POCT GLUCOSE MONITORING CONTINUOUS          Imaging Results              X-Ray Chest AP Portable (Final result)  Result time 01/29/24 13:14:53      Final result by Efrain Sheridan MD (01/29/24 13:14:53)                   Impression:      No acute abnormality.      Electronically signed by: Efrain Sheridan MD  Date:    01/29/2024  Time:    13:14               Narrative:    EXAMINATION:  XR CHEST AP PORTABLE    CLINICAL HISTORY:  hyperglycemia;    TECHNIQUE:  Single frontal view of the chest was performed.    COMPARISON:  Chest radiographs, most recent from 09/20/2023    FINDINGS:  The lungs are clear, with normal appearance of pulmonary vasculature and no pleural effusion or pneumothorax.    The cardiac silhouette is normal in size. The hilar and mediastinal contours are unremarkable.    Bones are intact.                                       Medications   sodium  chloride 0.9% bolus 1,000 mL 1,000 mL (0 mLs Intravenous Stopped 1/29/24 1438)   acetaminophen tablet 650 mg (650 mg Oral Given 1/29/24 1324)   insulin regular injection 5 Units 0.05 mL (5 Units Intravenous Given 1/29/24 1505)   potassium bicarbonate disintegrating tablet 40 mEq (40 mEq Oral Given 1/29/24 1507)   sodium chloride 0.9% bolus 1,000 mL 1,000 mL (0 mLs Intravenous Stopped 1/29/24 1645)   cefTRIAXone injection 500 mg (500 mg Intramuscular Given 1/29/24 1650)   azithromycin tablet 1,000 mg (1,000 mg Oral Given 1/29/24 1646)     Medical Decision Making  23-year-old female with a chief complaint of urinary tract symptoms.  Patient is hemodynamically stable and in no acute distress.  Differential diagnosis includes but is not limited to UTI, nephrolithiasis, pyelonephritis, or symptoms associated with hyperglycemia.  The patient has been afebrile, has no CVA tenderness, and no history of kidney stones.  Nephrolithiasis and pyelonephritis is unlikely.  We will evaluate for UTI and DKA.  UA results negative for signs of UTI.  However, patient is hyperglycemic.  We will continue to treat hyperglycemia.      Hyperglycemia has been treated with fluids and insulin and has resolved.  Vaginal swab was negative for clue cells and we will treat empirically for STIs based on the patient's symptomology and pending chlamydia and gonorrhea swab.  Patient has been counseled about the importance of taking her insulin as prescribed.  I have given her an outpatient follow up with a general practice diabetes doctor.  Additionally the patient has insulin, lancets, and the rest of her diabetic supplies that are available for  at the [facility].  I will discharge the patient with return precautions and the recommendation to follow up immediately with the primary care doctor to ensure that her insulin protocols are appropriate.    Amount and/or Complexity of Data Reviewed  Labs: ordered. Decision-making details  documented in ED Course.  Radiology: ordered and independent interpretation performed. Decision-making details documented in ED Course.  ECG/medicine tests: ordered and independent interpretation performed. Decision-making details documented in ED Course.    Risk  OTC drugs.  Prescription drug management.              Attending Attestation:   Physician Attestation Statement for Resident:  As the supervising MD   Physician Attestation Statement: I have personally seen and examined this patient.   I agree with the above history.  -: Critical care time spent on the evaluation and treatment of severe organ dysfunction, review of pertinent labs and imaging studies, discussions with consulting providers and discussions with patient/family: 60 minutes.     As the supervising MD I agree with the above PE.     As the supervising MD I agree with the above treatment, course, plan, and disposition.                    ED Course as of 01/29/24 1657   Mon Jan 29, 2024   1341 My interpretation of the EKG is that she is in sinus tachycardia.  Normal axis.  Normal intervals.  No signs of STEMI. [VC]   1342 Patient is mildly acidotic with a bicarb of 18.3.  CBC is unremarkable. [VC]   1352 Urine shows no sign of UTI.  However, she has +4 glucose +3 ketones. [VC]   1353 Urine pregnancy test is negative [VC]   1532 Beta hydroxybutyrate is positive.  Blood glucose is grossly elevated.  We will attempt to bring it down with fluid bolus. [VC]   1534 Provided 5 units of insulin and potassium. [VC]   1535 My independent interpretation of the x-ray is that there was no consolidation, no pulmonary effusion, no infiltrates, no apparent interstitial lung disease, no pneumothorax, normal size mediastinum.  No evidence of acute pathology. [VC]   1626 Pt negative for hyphae and clue cells.  Will provide rocephin and azithromycin for empiric treatment of STI.   [VC]      ED Course User Index  [VC] Aayush Sagastume MD                              Clinical Impression:  Final diagnoses:  [R73.9] Hyperglycemia  [E10.65] Uncontrolled type 1 diabetes mellitus with hyperglycemia (Primary)  [N76.0] Acute vaginitis          ED Disposition Condition    Discharge Stable          ED Prescriptions    None       Follow-up Information    None          Aayush Sagastume MD  Resident  01/29/24 4140       Rickey Freeman MD  01/29/24 5387

## 2024-01-30 PROBLEM — Z11.3 SCREENING EXAMINATION FOR STD (SEXUALLY TRANSMITTED DISEASE): Status: ACTIVE | Noted: 2024-01-30

## 2024-02-01 ENCOUNTER — HOSPITAL ENCOUNTER (INPATIENT)
Facility: HOSPITAL | Age: 24
LOS: 4 days | Discharge: HOME | DRG: 758 | End: 2024-02-05
Attending: EMERGENCY MEDICINE | Admitting: FAMILY MEDICINE
Payer: MEDICAID

## 2024-02-01 DIAGNOSIS — B37.31 VAGINAL CANDIDIASIS: ICD-10-CM

## 2024-02-01 DIAGNOSIS — N76.0 LABIAL INFECTION: Primary | ICD-10-CM

## 2024-02-01 DIAGNOSIS — R07.9 CHEST PAIN: ICD-10-CM

## 2024-02-01 DIAGNOSIS — R73.9 HYPERGLYCEMIA: ICD-10-CM

## 2024-02-01 DIAGNOSIS — E10.65 UNCONTROLLED TYPE 1 DIABETES MELLITUS WITH HYPERGLYCEMIA: ICD-10-CM

## 2024-02-01 DIAGNOSIS — R79.89: ICD-10-CM

## 2024-02-01 DIAGNOSIS — N76.0 LABIAL INFECTION: ICD-10-CM

## 2024-02-01 DIAGNOSIS — A41.9 SEPSIS: ICD-10-CM

## 2024-02-01 DIAGNOSIS — R79.82 ELEVATED C-REACTIVE PROTEIN (CRP): ICD-10-CM

## 2024-02-01 LAB
ALBUMIN SERPL BCP-MCNC: 3.4 G/DL (ref 3.5–5.2)
ALP SERPL-CCNC: 93 U/L (ref 55–135)
ALT SERPL W/O P-5'-P-CCNC: 14 U/L (ref 10–44)
ANION GAP SERPL CALC-SCNC: 11 MMOL/L (ref 8–16)
AST SERPL-CCNC: 14 U/L (ref 10–40)
B-HCG UR QL: NEGATIVE
B-OH-BUTYR BLD STRIP-SCNC: 2.7 MMOL/L (ref 0–0.5)
BACTERIA #/AREA URNS HPF: ABNORMAL /HPF
BASOPHILS # BLD AUTO: 0.04 K/UL (ref 0–0.2)
BASOPHILS NFR BLD: 0.3 % (ref 0–1.9)
BILIRUB SERPL-MCNC: 0.3 MG/DL (ref 0.1–1)
BILIRUB UR QL STRIP: NEGATIVE
BUN SERPL-MCNC: 10 MG/DL (ref 6–20)
C TRACH DNA SPEC QL NAA+PROBE: NOT DETECTED
CALCIUM SERPL-MCNC: 9.4 MG/DL (ref 8.7–10.5)
CHLORIDE SERPL-SCNC: 100 MMOL/L (ref 95–110)
CLARITY UR: CLEAR
CO2 SERPL-SCNC: 17 MMOL/L (ref 23–29)
COLOR UR: COLORLESS
CREAT SERPL-MCNC: 1.3 MG/DL (ref 0.5–1.4)
CRP SERPL-MCNC: 156.7 MG/L (ref 0–8.2)
CTP QC/QA: YES
DIFFERENTIAL METHOD BLD: ABNORMAL
EOSINOPHIL # BLD AUTO: 0 K/UL (ref 0–0.5)
EOSINOPHIL NFR BLD: 0 % (ref 0–8)
ERYTHROCYTE [DISTWIDTH] IN BLOOD BY AUTOMATED COUNT: 11.5 % (ref 11.5–14.5)
ERYTHROCYTE [SEDIMENTATION RATE] IN BLOOD BY WESTERGREN METHOD: 62 MM/HR (ref 0–20)
EST. GFR  (NO RACE VARIABLE): 60 ML/MIN/1.73 M^2
GLUCOSE SERPL-MCNC: 500 MG/DL (ref 70–110)
GLUCOSE UR QL STRIP: ABNORMAL
HCT VFR BLD AUTO: 35.6 % (ref 37–48.5)
HGB BLD-MCNC: 12.3 G/DL (ref 12–16)
HGB UR QL STRIP: ABNORMAL
IMM GRANULOCYTES # BLD AUTO: 0.05 K/UL (ref 0–0.04)
IMM GRANULOCYTES NFR BLD AUTO: 0.4 % (ref 0–0.5)
KETONES UR QL STRIP: ABNORMAL
LACTATE SERPL-SCNC: 1.2 MMOL/L (ref 0.5–2.2)
LEUKOCYTE ESTERASE UR QL STRIP: ABNORMAL
LYMPHOCYTES # BLD AUTO: 1.3 K/UL (ref 1–4.8)
LYMPHOCYTES NFR BLD: 9.2 % (ref 18–48)
MCH RBC QN AUTO: 27.3 PG (ref 27–31)
MCHC RBC AUTO-ENTMCNC: 34.6 G/DL (ref 32–36)
MCV RBC AUTO: 79 FL (ref 82–98)
MICROSCOPIC COMMENT: ABNORMAL
MONOCYTES # BLD AUTO: 1.6 K/UL (ref 0.3–1)
MONOCYTES NFR BLD: 12 % (ref 4–15)
N GONORRHOEA DNA SPEC QL NAA+PROBE: NOT DETECTED
NEUTROPHILS # BLD AUTO: 10.6 K/UL (ref 1.8–7.7)
NEUTROPHILS NFR BLD: 78.1 % (ref 38–73)
NITRITE UR QL STRIP: NEGATIVE
NRBC BLD-RTO: 0 /100 WBC
PH UR STRIP: 6 [PH] (ref 5–8)
PLATELET # BLD AUTO: 306 K/UL (ref 150–450)
PMV BLD AUTO: 10.2 FL (ref 9.2–12.9)
POCT GLUCOSE: 224 MG/DL (ref 70–110)
POTASSIUM SERPL-SCNC: 3.9 MMOL/L (ref 3.5–5.1)
PROT SERPL-MCNC: 7.9 G/DL (ref 6–8.4)
PROT UR QL STRIP: NEGATIVE
RBC # BLD AUTO: 4.5 M/UL (ref 4–5.4)
RBC #/AREA URNS HPF: 8 /HPF (ref 0–4)
SODIUM SERPL-SCNC: 128 MMOL/L (ref 136–145)
SP GR UR STRIP: 1.03 (ref 1–1.03)
URN SPEC COLLECT METH UR: ABNORMAL
UROBILINOGEN UR STRIP-ACNC: NEGATIVE EU/DL
WBC # BLD AUTO: 13.57 K/UL (ref 3.9–12.7)
WBC #/AREA URNS HPF: 23 /HPF (ref 0–5)
YEAST URNS QL MICRO: ABNORMAL

## 2024-02-01 PROCEDURE — 99291 CRITICAL CARE FIRST HOUR: CPT

## 2024-02-01 PROCEDURE — 80053 COMPREHEN METABOLIC PANEL: CPT | Performed by: NURSE PRACTITIONER

## 2024-02-01 PROCEDURE — 96376 TX/PRO/DX INJ SAME DRUG ADON: CPT

## 2024-02-01 PROCEDURE — 12000002 HC ACUTE/MED SURGE SEMI-PRIVATE ROOM

## 2024-02-01 PROCEDURE — 87077 CULTURE AEROBIC IDENTIFY: CPT | Performed by: NURSE PRACTITIONER

## 2024-02-01 PROCEDURE — 86140 C-REACTIVE PROTEIN: CPT | Performed by: NURSE PRACTITIONER

## 2024-02-01 PROCEDURE — 96375 TX/PRO/DX INJ NEW DRUG ADDON: CPT

## 2024-02-01 PROCEDURE — 81000 URINALYSIS NONAUTO W/SCOPE: CPT | Performed by: NURSE PRACTITIONER

## 2024-02-01 PROCEDURE — 96367 TX/PROPH/DG ADDL SEQ IV INF: CPT

## 2024-02-01 PROCEDURE — 82962 GLUCOSE BLOOD TEST: CPT

## 2024-02-01 PROCEDURE — 25000003 PHARM REV CODE 250: Performed by: EMERGENCY MEDICINE

## 2024-02-01 PROCEDURE — 87088 URINE BACTERIA CULTURE: CPT | Performed by: NURSE PRACTITIONER

## 2024-02-01 PROCEDURE — 63600175 PHARM REV CODE 636 W HCPCS: Mod: JZ,JG

## 2024-02-01 PROCEDURE — 25000003 PHARM REV CODE 250: Performed by: NURSE PRACTITIONER

## 2024-02-01 PROCEDURE — 83605 ASSAY OF LACTIC ACID: CPT | Mod: 91 | Performed by: NURSE PRACTITIONER

## 2024-02-01 PROCEDURE — 85652 RBC SED RATE AUTOMATED: CPT | Performed by: NURSE PRACTITIONER

## 2024-02-01 PROCEDURE — 96365 THER/PROPH/DIAG IV INF INIT: CPT

## 2024-02-01 PROCEDURE — 93010 ELECTROCARDIOGRAM REPORT: CPT | Mod: ,,, | Performed by: INTERNAL MEDICINE

## 2024-02-01 PROCEDURE — 63600175 PHARM REV CODE 636 W HCPCS: Performed by: EMERGENCY MEDICINE

## 2024-02-01 PROCEDURE — 85025 COMPLETE CBC W/AUTO DIFF WBC: CPT | Performed by: NURSE PRACTITIONER

## 2024-02-01 PROCEDURE — 63600175 PHARM REV CODE 636 W HCPCS: Mod: JZ,JG | Performed by: NURSE PRACTITIONER

## 2024-02-01 PROCEDURE — 87186 SC STD MICRODIL/AGAR DIL: CPT | Performed by: NURSE PRACTITIONER

## 2024-02-01 PROCEDURE — 87086 URINE CULTURE/COLONY COUNT: CPT | Performed by: NURSE PRACTITIONER

## 2024-02-01 PROCEDURE — 81025 URINE PREGNANCY TEST: CPT | Performed by: NURSE PRACTITIONER

## 2024-02-01 PROCEDURE — 25500020 PHARM REV CODE 255: Performed by: EMERGENCY MEDICINE

## 2024-02-01 PROCEDURE — 96360 HYDRATION IV INFUSION INIT: CPT | Mod: 59

## 2024-02-01 PROCEDURE — 93005 ELECTROCARDIOGRAM TRACING: CPT

## 2024-02-01 PROCEDURE — 82010 KETONE BODYS QUAN: CPT | Performed by: NURSE PRACTITIONER

## 2024-02-01 PROCEDURE — 87040 BLOOD CULTURE FOR BACTERIA: CPT | Mod: 59 | Performed by: NURSE PRACTITIONER

## 2024-02-01 RX ORDER — ONDANSETRON HYDROCHLORIDE 2 MG/ML
4 INJECTION, SOLUTION INTRAVENOUS
Status: COMPLETED | OUTPATIENT
Start: 2024-02-01 | End: 2024-02-01

## 2024-02-01 RX ORDER — MORPHINE SULFATE 4 MG/ML
6 INJECTION, SOLUTION INTRAMUSCULAR; INTRAVENOUS
Status: COMPLETED | OUTPATIENT
Start: 2024-02-01 | End: 2024-02-01

## 2024-02-01 RX ORDER — MORPHINE SULFATE 4 MG/ML
6 INJECTION, SOLUTION INTRAMUSCULAR; INTRAVENOUS
Status: DISCONTINUED | OUTPATIENT
Start: 2024-02-01 | End: 2024-02-01

## 2024-02-01 RX ORDER — CLINDAMYCIN PHOSPHATE 900 MG/50ML
900 INJECTION, SOLUTION INTRAVENOUS
Status: COMPLETED | OUTPATIENT
Start: 2024-02-01 | End: 2024-02-01

## 2024-02-01 RX ORDER — MORPHINE SULFATE 4 MG/ML
4 INJECTION, SOLUTION INTRAMUSCULAR; INTRAVENOUS
Status: COMPLETED | OUTPATIENT
Start: 2024-02-01 | End: 2024-02-01

## 2024-02-01 RX ADMIN — VANCOMYCIN HYDROCHLORIDE 1250 MG: 1.25 INJECTION, POWDER, LYOPHILIZED, FOR SOLUTION INTRAVENOUS at 09:02

## 2024-02-01 RX ADMIN — MORPHINE SULFATE 6 MG: 4 INJECTION, SOLUTION INTRAMUSCULAR; INTRAVENOUS at 07:02

## 2024-02-01 RX ADMIN — INSULIN HUMAN 6 UNITS: 100 INJECTION, SOLUTION PARENTERAL at 09:02

## 2024-02-01 RX ADMIN — ONDANSETRON 4 MG: 2 INJECTION INTRAMUSCULAR; INTRAVENOUS at 07:02

## 2024-02-01 RX ADMIN — MORPHINE SULFATE 4 MG: 4 INJECTION, SOLUTION INTRAMUSCULAR; INTRAVENOUS at 09:02

## 2024-02-01 RX ADMIN — IOHEXOL 75 ML: 350 INJECTION, SOLUTION INTRAVENOUS at 10:02

## 2024-02-01 RX ADMIN — PIPERACILLIN AND TAZOBACTAM 4.5 G: 4; .5 INJECTION, POWDER, LYOPHILIZED, FOR SOLUTION INTRAVENOUS; PARENTERAL at 08:02

## 2024-02-01 RX ADMIN — SODIUM CHLORIDE 1000 ML: 9 INJECTION, SOLUTION INTRAVENOUS at 07:02

## 2024-02-01 RX ADMIN — CLINDAMYCIN PHOSPHATE 900 MG: 900 INJECTION, SOLUTION INTRAVENOUS at 08:02

## 2024-02-01 RX ADMIN — SODIUM CHLORIDE 878 ML: 9 INJECTION, SOLUTION INTRAVENOUS at 09:02

## 2024-02-02 PROBLEM — N76.0 LABIAL INFECTION: Status: ACTIVE | Noted: 2024-02-02

## 2024-02-02 PROBLEM — N76.82: Status: ACTIVE | Noted: 2024-02-02

## 2024-02-02 PROBLEM — N39.0 UTI (URINARY TRACT INFECTION): Status: ACTIVE | Noted: 2024-02-02

## 2024-02-02 LAB
ALBUMIN SERPL BCP-MCNC: 3 G/DL (ref 3.5–5.2)
ALP SERPL-CCNC: 88 U/L (ref 55–135)
ALT SERPL W/O P-5'-P-CCNC: 21 U/L (ref 10–44)
ANION GAP SERPL CALC-SCNC: 14 MMOL/L (ref 8–16)
AST SERPL-CCNC: 30 U/L (ref 10–40)
BACTERIA #/AREA URNS HPF: ABNORMAL /HPF
BASOPHILS NFR BLD: 1 % (ref 0–1.9)
BILIRUB SERPL-MCNC: 0.2 MG/DL (ref 0.1–1)
BILIRUB UR QL STRIP: NEGATIVE
BUN SERPL-MCNC: 6 MG/DL (ref 6–20)
CALCIUM SERPL-MCNC: 8.3 MG/DL (ref 8.7–10.5)
CHLORIDE SERPL-SCNC: 106 MMOL/L (ref 95–110)
CLARITY UR: CLEAR
CO2 SERPL-SCNC: 13 MMOL/L (ref 23–29)
COLOR UR: COLORLESS
CREAT SERPL-MCNC: 0.8 MG/DL (ref 0.5–1.4)
DIFFERENTIAL METHOD BLD: ABNORMAL
EOSINOPHIL NFR BLD: 0 % (ref 0–8)
ERYTHROCYTE [DISTWIDTH] IN BLOOD BY AUTOMATED COUNT: 11.6 % (ref 11.5–14.5)
EST. GFR  (NO RACE VARIABLE): >60 ML/MIN/1.73 M^2
ESTIMATED AVG GLUCOSE: ABNORMAL MG/DL (ref 68–131)
GLUCOSE SERPL-MCNC: 299 MG/DL (ref 70–110)
GLUCOSE UR QL STRIP: ABNORMAL
HBA1C MFR BLD: >14 % (ref 4–5.6)
HCT VFR BLD AUTO: 33.9 % (ref 37–48.5)
HGB BLD-MCNC: 11.3 G/DL (ref 12–16)
HGB UR QL STRIP: ABNORMAL
IMM GRANULOCYTES # BLD AUTO: ABNORMAL K/UL (ref 0–0.04)
IMM GRANULOCYTES NFR BLD AUTO: ABNORMAL % (ref 0–0.5)
KETONES UR QL STRIP: ABNORMAL
LACTATE SERPL-SCNC: 1.3 MMOL/L (ref 0.5–2.2)
LEUKOCYTE ESTERASE UR QL STRIP: ABNORMAL
LYMPHOCYTES NFR BLD: 10 % (ref 18–48)
MCH RBC QN AUTO: 27.8 PG (ref 27–31)
MCHC RBC AUTO-ENTMCNC: 33.3 G/DL (ref 32–36)
MCV RBC AUTO: 84 FL (ref 82–98)
MICROSCOPIC COMMENT: ABNORMAL
MONOCYTES NFR BLD: 12 % (ref 4–15)
NEUTROPHILS NFR BLD: 73 % (ref 38–73)
NEUTS BAND NFR BLD MANUAL: 4 %
NITRITE UR QL STRIP: NEGATIVE
NRBC BLD-RTO: 0 /100 WBC
PH UR STRIP: 7 [PH] (ref 5–8)
PLATELET # BLD AUTO: 298 K/UL (ref 150–450)
PLATELET BLD QL SMEAR: ABNORMAL
PMV BLD AUTO: 10.3 FL (ref 9.2–12.9)
POCT GLUCOSE: 253 MG/DL (ref 70–110)
POCT GLUCOSE: 256 MG/DL (ref 70–110)
POCT GLUCOSE: 265 MG/DL (ref 70–110)
POCT GLUCOSE: 281 MG/DL (ref 70–110)
POCT GLUCOSE: 330 MG/DL (ref 70–110)
POCT GLUCOSE: 478 MG/DL (ref 70–110)
POTASSIUM SERPL-SCNC: 4 MMOL/L (ref 3.5–5.1)
PROT SERPL-MCNC: 6.8 G/DL (ref 6–8.4)
PROT UR QL STRIP: NEGATIVE
RBC # BLD AUTO: 4.06 M/UL (ref 4–5.4)
RBC #/AREA URNS HPF: 74 /HPF (ref 0–4)
SODIUM SERPL-SCNC: 133 MMOL/L (ref 136–145)
SP GR UR STRIP: 1.01 (ref 1–1.03)
SQUAMOUS #/AREA URNS HPF: 1 /HPF
TSH SERPL DL<=0.005 MIU/L-ACNC: 1.35 UIU/ML (ref 0.4–4)
URN SPEC COLLECT METH UR: ABNORMAL
UROBILINOGEN UR STRIP-ACNC: NEGATIVE EU/DL
WBC # BLD AUTO: 10.69 K/UL (ref 3.9–12.7)
WBC #/AREA URNS HPF: 20 /HPF (ref 0–5)
YEAST URNS QL MICRO: ABNORMAL

## 2024-02-02 PROCEDURE — 81000 URINALYSIS NONAUTO W/SCOPE: CPT | Performed by: NURSE PRACTITIONER

## 2024-02-02 PROCEDURE — 25000003 PHARM REV CODE 250: Performed by: FAMILY MEDICINE

## 2024-02-02 PROCEDURE — 87070 CULTURE OTHR SPECIMN AEROBIC: CPT | Performed by: STUDENT IN AN ORGANIZED HEALTH CARE EDUCATION/TRAINING PROGRAM

## 2024-02-02 PROCEDURE — 87086 URINE CULTURE/COLONY COUNT: CPT | Performed by: NURSE PRACTITIONER

## 2024-02-02 PROCEDURE — 85007 BL SMEAR W/DIFF WBC COUNT: CPT | Performed by: STUDENT IN AN ORGANIZED HEALTH CARE EDUCATION/TRAINING PROGRAM

## 2024-02-02 PROCEDURE — 84443 ASSAY THYROID STIM HORMONE: CPT | Performed by: STUDENT IN AN ORGANIZED HEALTH CARE EDUCATION/TRAINING PROGRAM

## 2024-02-02 PROCEDURE — 80053 COMPREHEN METABOLIC PANEL: CPT | Performed by: STUDENT IN AN ORGANIZED HEALTH CARE EDUCATION/TRAINING PROGRAM

## 2024-02-02 PROCEDURE — 87077 CULTURE AEROBIC IDENTIFY: CPT | Performed by: STUDENT IN AN ORGANIZED HEALTH CARE EDUCATION/TRAINING PROGRAM

## 2024-02-02 PROCEDURE — 25000003 PHARM REV CODE 250: Performed by: STUDENT IN AN ORGANIZED HEALTH CARE EDUCATION/TRAINING PROGRAM

## 2024-02-02 PROCEDURE — 99223 1ST HOSP IP/OBS HIGH 75: CPT | Mod: ,,,

## 2024-02-02 PROCEDURE — 63600175 PHARM REV CODE 636 W HCPCS: Performed by: STUDENT IN AN ORGANIZED HEALTH CARE EDUCATION/TRAINING PROGRAM

## 2024-02-02 PROCEDURE — 86696 HERPES SIMPLEX TYPE 2 TEST: CPT | Performed by: FAMILY MEDICINE

## 2024-02-02 PROCEDURE — 87186 SC STD MICRODIL/AGAR DIL: CPT | Performed by: STUDENT IN AN ORGANIZED HEALTH CARE EDUCATION/TRAINING PROGRAM

## 2024-02-02 PROCEDURE — 36415 COLL VENOUS BLD VENIPUNCTURE: CPT | Performed by: STUDENT IN AN ORGANIZED HEALTH CARE EDUCATION/TRAINING PROGRAM

## 2024-02-02 PROCEDURE — 83036 HEMOGLOBIN GLYCOSYLATED A1C: CPT | Performed by: STUDENT IN AN ORGANIZED HEALTH CARE EDUCATION/TRAINING PROGRAM

## 2024-02-02 PROCEDURE — 86694 HERPES SIMPLEX NES ANTBDY: CPT | Performed by: FAMILY MEDICINE

## 2024-02-02 PROCEDURE — 85027 COMPLETE CBC AUTOMATED: CPT | Performed by: STUDENT IN AN ORGANIZED HEALTH CARE EDUCATION/TRAINING PROGRAM

## 2024-02-02 PROCEDURE — 63600175 PHARM REV CODE 636 W HCPCS: Performed by: FAMILY MEDICINE

## 2024-02-02 PROCEDURE — 11000001 HC ACUTE MED/SURG PRIVATE ROOM

## 2024-02-02 RX ORDER — HEPARIN SODIUM 5000 [USP'U]/ML
5000 INJECTION, SOLUTION INTRAVENOUS; SUBCUTANEOUS EVERY 8 HOURS
Status: DISCONTINUED | OUTPATIENT
Start: 2024-02-02 | End: 2024-02-05 | Stop reason: HOSPADM

## 2024-02-02 RX ORDER — SODIUM CHLORIDE 0.9 % (FLUSH) 0.9 %
10 SYRINGE (ML) INJECTION
Status: DISCONTINUED | OUTPATIENT
Start: 2024-02-02 | End: 2024-02-05 | Stop reason: HOSPADM

## 2024-02-02 RX ORDER — SIMETHICONE 80 MG
1 TABLET,CHEWABLE ORAL 4 TIMES DAILY PRN
Status: DISCONTINUED | OUTPATIENT
Start: 2024-02-02 | End: 2024-02-05 | Stop reason: HOSPADM

## 2024-02-02 RX ORDER — HYDROXYZINE PAMOATE 25 MG/1
25 CAPSULE ORAL EVERY 8 HOURS PRN
Status: DISCONTINUED | OUTPATIENT
Start: 2024-02-02 | End: 2024-02-05 | Stop reason: HOSPADM

## 2024-02-02 RX ORDER — HYDROCODONE BITARTRATE AND ACETAMINOPHEN 5; 325 MG/1; MG/1
1 TABLET ORAL EVERY 6 HOURS PRN
Status: DISCONTINUED | OUTPATIENT
Start: 2024-02-02 | End: 2024-02-05 | Stop reason: HOSPADM

## 2024-02-02 RX ORDER — VALACYCLOVIR HYDROCHLORIDE 500 MG/1
1000 TABLET, FILM COATED ORAL 2 TIMES DAILY
Status: DISCONTINUED | OUTPATIENT
Start: 2024-02-02 | End: 2024-02-05 | Stop reason: HOSPADM

## 2024-02-02 RX ORDER — INSULIN ASPART 100 [IU]/ML
3 INJECTION, SOLUTION INTRAVENOUS; SUBCUTANEOUS
Status: DISCONTINUED | OUTPATIENT
Start: 2024-02-02 | End: 2024-02-02

## 2024-02-02 RX ORDER — CLINDAMYCIN HYDROCHLORIDE 150 MG/1
150 CAPSULE ORAL EVERY 6 HOURS
Status: DISCONTINUED | OUTPATIENT
Start: 2024-02-02 | End: 2024-02-02

## 2024-02-02 RX ORDER — ACETAMINOPHEN 325 MG/1
650 TABLET ORAL EVERY 8 HOURS PRN
Status: DISCONTINUED | OUTPATIENT
Start: 2024-02-02 | End: 2024-02-05 | Stop reason: HOSPADM

## 2024-02-02 RX ORDER — TALC
6 POWDER (GRAM) TOPICAL NIGHTLY PRN
Status: DISCONTINUED | OUTPATIENT
Start: 2024-02-02 | End: 2024-02-05 | Stop reason: HOSPADM

## 2024-02-02 RX ORDER — ONDANSETRON 8 MG/1
8 TABLET, ORALLY DISINTEGRATING ORAL EVERY 8 HOURS PRN
Status: DISCONTINUED | OUTPATIENT
Start: 2024-02-02 | End: 2024-02-05 | Stop reason: HOSPADM

## 2024-02-02 RX ORDER — FLUCONAZOLE 100 MG/1
200 TABLET ORAL DAILY
Status: DISCONTINUED | OUTPATIENT
Start: 2024-02-02 | End: 2024-02-02

## 2024-02-02 RX ORDER — IBUPROFEN 200 MG
16 TABLET ORAL
Status: DISCONTINUED | OUTPATIENT
Start: 2024-02-02 | End: 2024-02-05 | Stop reason: HOSPADM

## 2024-02-02 RX ORDER — INSULIN ASPART 100 [IU]/ML
1-15 INJECTION, SOLUTION INTRAVENOUS; SUBCUTANEOUS
Status: DISCONTINUED | OUTPATIENT
Start: 2024-02-02 | End: 2024-02-05 | Stop reason: HOSPADM

## 2024-02-02 RX ORDER — NALOXONE HCL 0.4 MG/ML
0.02 VIAL (ML) INJECTION
Status: DISCONTINUED | OUTPATIENT
Start: 2024-02-02 | End: 2024-02-05 | Stop reason: HOSPADM

## 2024-02-02 RX ORDER — KETOROLAC TROMETHAMINE 30 MG/ML
30 INJECTION, SOLUTION INTRAMUSCULAR; INTRAVENOUS ONCE
Status: COMPLETED | OUTPATIENT
Start: 2024-02-02 | End: 2024-02-02

## 2024-02-02 RX ORDER — INSULIN ASPART 100 [IU]/ML
0-5 INJECTION, SOLUTION INTRAVENOUS; SUBCUTANEOUS
Status: DISCONTINUED | OUTPATIENT
Start: 2024-02-02 | End: 2024-02-02

## 2024-02-02 RX ORDER — GLUCAGON 1 MG
1 KIT INJECTION
Status: DISCONTINUED | OUTPATIENT
Start: 2024-02-02 | End: 2024-02-05 | Stop reason: HOSPADM

## 2024-02-02 RX ORDER — SODIUM CHLORIDE, SODIUM LACTATE, POTASSIUM CHLORIDE, CALCIUM CHLORIDE 600; 310; 30; 20 MG/100ML; MG/100ML; MG/100ML; MG/100ML
INJECTION, SOLUTION INTRAVENOUS CONTINUOUS
Status: DISCONTINUED | OUTPATIENT
Start: 2024-02-02 | End: 2024-02-03

## 2024-02-02 RX ORDER — ACETAMINOPHEN 325 MG/1
650 TABLET ORAL EVERY 8 HOURS PRN
Status: DISCONTINUED | OUTPATIENT
Start: 2024-02-02 | End: 2024-02-02

## 2024-02-02 RX ORDER — FLUCONAZOLE 2 MG/ML
200 INJECTION, SOLUTION INTRAVENOUS
Status: DISCONTINUED | OUTPATIENT
Start: 2024-02-02 | End: 2024-02-05

## 2024-02-02 RX ORDER — ACETAMINOPHEN 325 MG/1
650 TABLET ORAL EVERY 4 HOURS PRN
Status: DISCONTINUED | OUTPATIENT
Start: 2024-02-02 | End: 2024-02-02

## 2024-02-02 RX ORDER — POLYETHYLENE GLYCOL 3350 17 G/17G
17 POWDER, FOR SOLUTION ORAL DAILY
Status: DISCONTINUED | OUTPATIENT
Start: 2024-02-02 | End: 2024-02-05 | Stop reason: HOSPADM

## 2024-02-02 RX ORDER — MICONAZOLE NITRATE 2 %
POWDER (GRAM) TOPICAL 2 TIMES DAILY
Status: DISCONTINUED | OUTPATIENT
Start: 2024-02-02 | End: 2024-02-05 | Stop reason: HOSPADM

## 2024-02-02 RX ORDER — IBUPROFEN 200 MG
24 TABLET ORAL
Status: DISCONTINUED | OUTPATIENT
Start: 2024-02-02 | End: 2024-02-05 | Stop reason: HOSPADM

## 2024-02-02 RX ADMIN — MICONAZOLE NITRATE: 20 POWDER TOPICAL at 11:02

## 2024-02-02 RX ADMIN — INSULIN ASPART 9 UNITS: 100 INJECTION, SOLUTION INTRAVENOUS; SUBCUTANEOUS at 12:02

## 2024-02-02 RX ADMIN — INSULIN ASPART 9 UNITS: 100 INJECTION, SOLUTION INTRAVENOUS; SUBCUTANEOUS at 09:02

## 2024-02-02 RX ADMIN — HYDROCODONE BITARTRATE AND ACETAMINOPHEN 1 TABLET: 5; 325 TABLET ORAL at 04:02

## 2024-02-02 RX ADMIN — INSULIN ASPART 9 UNITS: 100 INJECTION, SOLUTION INTRAVENOUS; SUBCUTANEOUS at 04:02

## 2024-02-02 RX ADMIN — MICONAZOLE NITRATE: 20 POWDER TOPICAL at 09:02

## 2024-02-02 RX ADMIN — KETOROLAC TROMETHAMINE 30 MG: 30 INJECTION INTRAMUSCULAR; INTRAVENOUS at 11:02

## 2024-02-02 RX ADMIN — VALACYCLOVIR HYDROCHLORIDE 1000 MG: 500 TABLET, FILM COATED ORAL at 09:02

## 2024-02-02 RX ADMIN — HEPARIN SODIUM 5000 UNITS: 5000 INJECTION INTRAVENOUS; SUBCUTANEOUS at 05:02

## 2024-02-02 RX ADMIN — VALACYCLOVIR HYDROCHLORIDE 1000 MG: 500 TABLET, FILM COATED ORAL at 11:02

## 2024-02-02 RX ADMIN — INSULIN ASPART 4 UNITS: 100 INJECTION, SOLUTION INTRAVENOUS; SUBCUTANEOUS at 07:02

## 2024-02-02 RX ADMIN — HYDROCODONE BITARTRATE AND ACETAMINOPHEN 1 TABLET: 5; 325 TABLET ORAL at 05:02

## 2024-02-02 RX ADMIN — PIPERACILLIN AND TAZOBACTAM 4.5 G: 4; .5 INJECTION, POWDER, LYOPHILIZED, FOR SOLUTION INTRAVENOUS; PARENTERAL at 05:02

## 2024-02-02 RX ADMIN — FLUCONAZOLE 200 MG: 2 INJECTION, SOLUTION INTRAVENOUS at 04:02

## 2024-02-02 RX ADMIN — INSULIN ASPART 3 UNITS: 100 INJECTION, SOLUTION INTRAVENOUS; SUBCUTANEOUS at 07:02

## 2024-02-02 RX ADMIN — HEPARIN SODIUM 5000 UNITS: 5000 INJECTION INTRAVENOUS; SUBCUTANEOUS at 01:02

## 2024-02-02 RX ADMIN — INSULIN DETEMIR 6 UNITS: 100 INJECTION, SOLUTION SUBCUTANEOUS at 08:02

## 2024-02-02 RX ADMIN — HEPARIN SODIUM 5000 UNITS: 5000 INJECTION INTRAVENOUS; SUBCUTANEOUS at 09:02

## 2024-02-02 RX ADMIN — CLINDAMYCIN HYDROCHLORIDE 150 MG: 150 CAPSULE ORAL at 05:02

## 2024-02-02 RX ADMIN — HYDROXYZINE PAMOATE 25 MG: 25 CAPSULE ORAL at 05:02

## 2024-02-02 RX ADMIN — HYDROCODONE BITARTRATE AND ACETAMINOPHEN 1 TABLET: 5; 325 TABLET ORAL at 10:02

## 2024-02-02 RX ADMIN — SODIUM CHLORIDE, POTASSIUM CHLORIDE, SODIUM LACTATE AND CALCIUM CHLORIDE: 600; 310; 30; 20 INJECTION, SOLUTION INTRAVENOUS at 05:02

## 2024-02-02 RX ADMIN — SODIUM CHLORIDE, POTASSIUM CHLORIDE, SODIUM LACTATE AND CALCIUM CHLORIDE: 600; 310; 30; 20 INJECTION, SOLUTION INTRAVENOUS at 04:02

## 2024-02-02 NOTE — ASSESSMENT & PLAN NOTE
She has not been compliant with her insulin regimen  Counseled patient for about 10-15 minutes on the need to adherence   Will consult diabetic educator while inpatient.

## 2024-02-02 NOTE — H&P
"  [facility] Medicine  History & Physical    Patient Name: Rosemary Zee  MRN: [id number]  Patient Class: IP- Inpatient  Admission Date: 2/1/2024  Attending Physician: Georgiana Jang MD   Primary Care Provider: [facility]         Patient information was obtained from patient, past medical records, and ER records.     Subjective:     Principal Problem:<principal problem not specified>    Chief Complaint:   Chief Complaint   Patient presents with    Vaginal Pain     Pt reports having "white bumps" on vagina and swelling since Wednesday. Reports seen in ER 2 days for same s/s. Reports started taking Diflucan and Zovirax yesterday. Denies discharge, bleeding.         HPI: 23-year-old  female with medical history significant for type 1 diabetes mellitus,medication non adherence with prior admission for DKA,who presented to the ED with whitish vaginal discharge,redness and swelling of her jake vaginal area, she first noticed the ' whitish bump' 5 days prior to presentation and was seen at an outside ED where acyclovir and fluconazole was prescribed but did not start taking them until a day prior to this presentation,she denied associated fever,chills or rigor,noticed dysuria which she attributed to the ulcer,no frequency,urgency,supra pubic pain or hematuria,she denied any abdominal pain,no nausea or vomiting,no change in bowel habit,denied diarrhea or constipation,she's sexually active and voiced using condom consistently,no previous history of STI.She voiced poorly controled diabetes, with random blood glucose mostly in the 300s,takes 6 units of Lantus twice daily.  Labs obtained in the ED showed  white blood cell count of 13.57,anion gap is 11. Lactic acid 1.2,beta hydroxy was elevated at 2.7. ESR 62 and .7, Urinalysis showed wbc 23 and 8 RBCs. RPR was non-reactive,CT abdominal pelvis showed no acute abnormality.          Past Medical History:   Diagnosis Date    " Diabetes mellitus        History reviewed. No pertinent surgical history.    Review of patient's allergies indicates:  No Known Allergies    No current facility-administered medications on file prior to encounter.     Current Outpatient Medications on File Prior to Encounter   Medication Sig    acyclovir (ZOVIRAX) 400 MG tablet Take 1 tablet (400 mg total) by mouth 3 (three) times daily. for 10 days    fluconazole (DIFLUCAN) 200 MG Tab Take 1 tablet (200 mg total) by mouth once daily. for 7 days    insulin detemir U-100 (LEVEMIR) 100 unit/mL injection Inject 8 Units into the skin every evening.     Family History       Problem Relation (Age of Onset)    Diabetes Mother, Maternal Grandfather, Paternal Grandmother    Heart attacks under age 50 Mother    Stroke Mother          Tobacco Use    Smoking status: Passive Smoke Exposure - Never Smoker    Smokeless tobacco: Never   Substance and Sexual Activity    Alcohol use: No    Drug use: No    Sexual activity: Yes     Partners: Male     Review of Systems   Constitutional:  Negative for appetite change, chills, fatigue and fever.   HENT:  Negative for congestion, ear discharge, ear pain and sore throat.    Eyes:  Negative for photophobia, redness and visual disturbance.   Respiratory:  Negative for apnea, cough, chest tightness, shortness of breath and wheezing.    Cardiovascular:  Negative for chest pain, palpitations and leg swelling.   Gastrointestinal:  Negative for abdominal distention, abdominal pain, blood in stool, constipation, diarrhea, nausea and vomiting.   Endocrine: Negative for cold intolerance and heat intolerance.   Genitourinary:  Positive for dysuria, genital sores and vaginal pain. Negative for difficulty urinating, flank pain, frequency and hematuria.   Musculoskeletal:  Negative for back pain and joint swelling.   Skin:  Negative for rash and wound.   Neurological:  Negative for dizziness, tremors, seizures, syncope, weakness, light-headedness and  headaches.   Hematological:  Positive for adenopathy.   Psychiatric/Behavioral:  Negative for behavioral problems, confusion, hallucinations and suicidal ideas.      Objective:     Vital Signs (Most Recent):  Temp: 97.5 °F (36.4 °C) (02/02/24 0157)  Pulse: 96 (02/02/24 0157)  Resp: 18 (02/02/24 0157)  BP: 113/70 (02/02/24 0157)  SpO2: 99 % (02/02/24 0157) Vital Signs (24h Range):  Temp:  [97.5 °F (36.4 °C)-97.9 °F (36.6 °C)] 97.5 °F (36.4 °C)  Pulse:  [] 96  Resp:  [14-20] 18  SpO2:  [96 %-100 %] 99 %  BP: ()/(52-77) 113/70     Weight: 59.3 kg (130 lb 12.8 oz)  Body mass index is 23.92 kg/m².     Physical Exam  Vitals reviewed.   Constitutional:       General: She is not in acute distress.     Appearance: Normal appearance. She is normal weight. She is not ill-appearing or toxic-appearing.   HENT:      Head: Normocephalic and atraumatic.      Nose: Nose normal. No congestion or rhinorrhea.      Mouth/Throat:      Mouth: Mucous membranes are dry.   Eyes:      Extraocular Movements: Extraocular movements intact.      Conjunctiva/sclera: Conjunctivae normal.      Pupils: Pupils are equal, round, and reactive to light.   Cardiovascular:      Rate and Rhythm: Normal rate and regular rhythm.      Pulses: Normal pulses.      Heart sounds: Normal heart sounds. No murmur heard.  Pulmonary:      Effort: Pulmonary effort is normal. No respiratory distress.      Breath sounds: Normal breath sounds. No stridor. No wheezing or rhonchi.   Abdominal:      General: Abdomen is flat. Bowel sounds are normal. There is no distension.      Palpations: Abdomen is soft.      Tenderness: There is no abdominal tenderness. There is no right CVA tenderness, left CVA tenderness, guarding or rebound.   Musculoskeletal:         General: No swelling or tenderness. Normal range of motion.      Cervical back: Normal range of motion and neck supple. No rigidity or tenderness.      Right lower leg: No edema.      Left lower leg: No edema.    Skin:     General: Skin is warm and dry.      Coloration: Skin is not jaundiced.      Findings: Erythema and lesion (around the labial) present.   Neurological:      General: No focal deficit present.      Mental Status: She is alert and oriented to person, place, and time. Mental status is at baseline.      Cranial Nerves: No cranial nerve deficit.      Sensory: No sensory deficit.   Psychiatric:         Mood and Affect: Mood normal.         Behavior: Behavior normal.         Thought Content: Thought content normal.         Judgment: Judgment normal.              CRANIAL NERVES     CN III, IV, VI   Pupils are equal, round, and reactive to light.       Significant Labs:  Bilirubin:   Recent Labs   Lab 01/29/24  1320 02/01/24 1955   BILITOT 0.4 0.3     CBC:   Recent Labs   Lab 02/01/24 1955   WBC 13.57*   HGB 12.3   HCT 35.6*        CMP:   Recent Labs   Lab 02/01/24 1955   *   K 3.9      CO2 17*   *   BUN 10   CREATININE 1.3   CALCIUM 9.4   PROT 7.9   ALBUMIN 3.4*   BILITOT 0.3   ALKPHOS 93   AST 14   ALT 14   ANIONGAP 11     Lactic Acid:   Recent Labs   Lab 02/01/24 1955 02/01/24  2328   LACTATE 1.2 1.3     Urine Studies:   Recent Labs   Lab 02/01/24  1914   COLORU Colorless*   APPEARANCEUA Clear   PHUR 6.0   SPECGRAV 1.030   PROTEINUA Negative   GLUCUA 4+*   KETONESU 1+*   BILIRUBINUA Negative   OCCULTUA Trace*   NITRITE Negative   UROBILINOGEN Negative   LEUKOCYTESUR 1+*   RBCUA 8*   WBCUA 23*   BACTERIA Rare       Significant Imaging: I have reviewed all pertinent imaging results/findings within the past 24 hours.  Imaging Results              CT Abdomen Pelvis With IV Contrast NO Oral Contrast (Final result)  Result time 02/01/24 22:38:21      Final result by Ninoska Alaniz MD (02/01/24 22:38:21)                   Impression:      Mildly elongated right lobe of the liver.  Findings may suggest a Riedel's lobe versus mild hepatomegaly.    Mild bilateral inguinal adenopathy.   "Question pelvic infection or other etiology.    Multiple tiny blood vessels seen involving vulva and perineum.      Electronically signed by: Ninoska Alaniz  Date:    02/01/2024  Time:    22:38               Narrative:    EXAMINATION:  CT OF ABDOMEN PELVIS WITH    CLINICAL HISTORY:  Abdominal abscess/infection suspected;Sepsis;    TECHNIQUE:  5 mm enhanced axial images were obtained from the lung bases through the greater trochanters.  Seventy-five mL of Omnipaque 350 was injected.    COMPARISON:  None.    FINDINGS:  The right lobe of the liver is mildly elongated.    Spleen, pancreas, kidneys, and adrenal glands are unremarkable. The gallbladder .    There is no definite evidence for abdominal adenopathy or ascites.    There is moderate colonic stool.    There are no pelvic masses.  Mild bilateral inguinal adenopathy is present.  There are multiple tiny blood vessels seen along the vulva and perineum.  There are not dilated as seen in varicosities.  The appendix is not inflamed.  The urinary bladder is moderately distended without wall thickening.    There is small free fluid in the pelvis.    There is mild bibasilar atelectasis.                                       X-Ray Chest AP Portable (Final result)  Result time 02/01/24 21:40:18      Final result by Sulaiman Enriquez MD (02/01/24 21:40:18)                   Impression:      No acute cardiopulmonary finding identified on this single view.      Electronically signed by: Sulaiman Enriquez MD  Date:    02/01/2024  Time:    21:40               Narrative:    EXAMINATION:  XR CHEST AP PORTABLE    CLINICAL HISTORY:  Provided history is "Sepsis;  ".    TECHNIQUE:  One view of the chest.    COMPARISON:  01/29/2024.    FINDINGS:  Cardiac silhouette is not enlarged.  No focal consolidation.  No sizable pleural effusion.  No pneumothorax.                                      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  With low tolerance to consult Urology if no improvement.      Labial infection  Presented with bilateral labia swelling, redness and purulent discharge   Will send for culture   We will screening for STI at this time   Gonorrhea/chlamydia DNA pcr  Will treat with empiric antibiotics till result is obtained  RPR was nonreactive.      UTI (urinary tract infection)  She presented with dysuria  Urinalysis showed 1+ leukocyte esterase, microscopy with 23 WBC, 8 RBC  Symptoms may maybe due to ulcers around the genital   Antibiotics for cellulitis will also cover urinary tract infection  Urinalysis with reflex to culture       Screening examination for STD (sexually transmitted disease)  She voiced being sexually active   No prior history of STI  Will screen for gonorrhea and chlamydia at this time   As stated above RPR was nonreactive.  Started on broad-spectrum antibiotics due to concern for Kathy's gangrene      Non-compliance  She has not been compliant with her insulin regimen  Counseled patient for about 10-15 minutes on the need to adherence   Will consult diabetic educator while inpatient.      Type 1 diabetes mellitus with hyperglycemia  Patient's FSGs are uncontrolled due to hyperglycemia on current medication regimen.  Last A1c reviewed-   Lab Results   Component Value Date    HGBA1C >14.0 (H) 10/21/2020     Most recent fingerstick glucose reviewed-   Recent Labs   Lab 02/01/24  2322 02/02/24  0201   POCTGLUCOSE 224* 265*     Current correctional scale  moderate  Maintain anti-hyperglycemic dose as follows-   Antihyperglycemics (From admission, onward)      Start     Stop Route Frequency Ordered    02/02/24 0900  insulin detemir U-100 (Levemir) pen 6 Units         -- SubQ 2 times daily 02/02/24 0244    02/02/24 0715  insulin aspart U-100 pen 3 Units         -- SubQ 3 times  daily with meals 02/02/24 0244    02/02/24 0340  insulin aspart U-100 pen 0-5 Units         -- SubQ Before meals & nightly PRN 02/02/24 0244          Hold Oral hypoglycemics while patient is in the hospital.      VTE Risk Mitigation (From admission, onward)           Ordered     heparin (porcine) injection 5,000 Units  Every 8 hours         02/02/24 0244     IP VTE LOW RISK PATIENT  Once         02/02/24 0244     Place sequential compression device  Until discontinued         02/02/24 0244                     Patient admitted under my care to hospital medicine service 2/2/2024            Pharmacokinetic Initial Assessment: IV Vancomycin    Assessment/Plan:    Initiate intravenous vancomycin with loading dose of 1250 mg once followed by a maintenance dose of vancomycin 1250 mg IV every 24 hours  Desired empiric serum trough concentration is 10 to 20 mcg/mL  Draw vancomycin trough level 60 min prior to third dose on 2/3/24 at approximately 2100  Pharmacy will continue to follow and monitor vancomycin.      Please contact pharmacy at extension [phone] with any questions regarding this assessment.     Thank you for the consult,   Xander Dinh       Patient brief summary:  Rosemary Zee is a 23 y.o. female initiated on antimicrobial therapy with IV Vancomycin for treatment of suspected urinary tract infection    Drug Allergies:   Review of patient's allergies indicates:  No Known Allergies    Actual Body Weight:   62.6 kg    Renal Function:   Estimated Creatinine Clearance: 59.5 mL/min (based on SCr of 1.3 mg/dL).,     Dialysis Method (if applicable):  N/A    CBC (last 72 hours):  Recent Labs   Lab Result Units 02/01/24  1955   WBC K/uL 13.57*   Hemoglobin g/dL 12.3   Hematocrit % 35.6*   Platelets K/uL 306   Gran % % 78.1*   Lymph % % 9.2*   Mono % % 12.0   Eosinophil % % 0.0   Basophil % % 0.3   Differential Method  Automated       Metabolic Panel (last 72 hours):  Recent Labs   Lab Result Units  "02/01/24 1914 02/01/24 1955   Sodium mmol/L  --  128*   Potassium mmol/L  --  3.9   Chloride mmol/L  --  100   CO2 mmol/L  --  17*   Glucose mg/dL  --  500*   Glucose, UA  4+*  --    BUN mg/dL  --  10   Creatinine mg/dL  --  1.3   Albumin g/dL  --  3.4*   Total Bilirubin mg/dL  --  0.3   Alkaline Phosphatase U/L  --  93   AST U/L  --  14   ALT U/L  --  14       Drug levels (last 3 results):  No results for input(s): "VANCOMYCINRA", "VANCORANDOM", "VANCOMYCINPE", "VANCOPEAK", "VANCOMYCINTR", "VANCOTROUGH" in the last 72 hours.    Microbiologic Results:  Microbiology Results (last 7 days)       Procedure Component Value Units Date/Time    Blood culture x two cultures. Draw prior to antibiotics. [[id number]]     Order Status: Canceled Specimen: Blood     Blood culture x two cultures. Draw prior to antibiotics. [[id number]]     Order Status: Canceled Specimen: Blood     Blood Culture #2 **CANNOT BE ORDERED STAT** [[id number]] Collected: 02/01/24 1955    Order Status: Sent Specimen: Blood from Peripheral, Antecubital, Left Updated: 02/2002    Blood Culture #1 **CANNOT BE ORDERED STAT** [[id number]] Collected: 02/01/24 1954    Order Status: Sent Specimen: Blood from Peripheral, Antecubital, Right Updated: 02/2002    Urine culture [[id number]] Collected: 02/01/24 1914    Order Status: No result Specimen: Urine Updated: 02/01/24 1933              AdmissionCare    Guideline: Sepsis (and Other Febrile Illness without Focal Infection) - INPT, Inpatient    Based on the indications selected for the patient, the bed status of Inpatient was determined to be MET    The following indications were selected as present at the time of evaluation of the patient:      - Parenteral antimicrobial regimen that must be implemented on inpatient basis (eg, infusion or monitoring needs beyond capabilities of outpatient parenteral therapy)    AdmissionCare documentation entered by: Annie Hicks    [facility], 27th edition, " Copyright © 2023 [facility], [facility] All Rights Reserved.  [id number]:00    Suresh Montero MD  Department of Hospital Medicine  [facility]

## 2024-02-02 NOTE — ED TRIAGE NOTES
Pt presents to ER with complaints of vaginal bumps since Sunday. Pt denies using any meds or creams for bumps. Pt denies bleeding and discharge at this time. Pt denies any other issues at this time.

## 2024-02-02 NOTE — ASSESSMENT & PLAN NOTE
She presented with dysuria  Urinalysis showed 1+ leukocyte esterase, microscopy with 23 WBC, 8 RBC  Symptoms may maybe due to ulcers around the genital   Antibiotics for cellulitis will also cover urinary tract infection  Urinalysis with reflex to culture

## 2024-02-02 NOTE — HPI
23-year-old  female with medical history significant for type 1 diabetes mellitus,medication non adherence with prior admission for DKA,who presented to the ED with whitish vaginal discharge,redness and swelling of her jake vaginal area, she first noticed the ' whitish bump' 5 days prior to presentation and was seen at an outside ED where acyclovir and fluconazole was prescribed but did not start taking them until a day prior to this presentation,she denied associated fever,chills or rigor,noticed dysuria which she attributed to the ulcer,no frequency,urgency,supra pubic pain or hematuria,she denied any abdominal pain,no nausea or vomiting,no change in bowel habit,denied diarrhea or constipation,she's sexually active and voiced using condom consistently,no previous history of STI.She voiced poorly controled diabetes, with random blood glucose mostly in the 300s,takes 6 units of Lantus twice daily.  Labs obtained in the ED showed  white blood cell count of 13.57,anion gap is 11. Lactic acid 1.2,beta hydroxy was elevated at 2.7. ESR 62 and .7, Urinalysis showed wbc 23 and 8 RBCs. RPR was non-reactive,CT abdominal pelvis showed no acute abnormality.

## 2024-02-02 NOTE — PROGRESS NOTES
"Pharmacokinetic Initial Assessment: IV Vancomycin    Assessment/Plan:    Initiate intravenous vancomycin with loading dose of 1250 mg once followed by a maintenance dose of vancomycin 1250 mg IV every 24 hours  Desired empiric serum trough concentration is 10 to 20 mcg/mL  Draw vancomycin trough level 60 min prior to third dose on 2/3/24 at approximately 2100  Pharmacy will continue to follow and monitor vancomycin.      Please contact pharmacy at extension [phone] with any questions regarding this assessment.     Thank you for the consult,   Xander Dinh       Patient brief summary:  Rosemary Zee is a 23 y.o. female initiated on antimicrobial therapy with IV Vancomycin for treatment of suspected urinary tract infection    Drug Allergies:   Review of patient's allergies indicates:  No Known Allergies    Actual Body Weight:   62.6 kg    Renal Function:   Estimated Creatinine Clearance: 59.5 mL/min (based on SCr of 1.3 mg/dL).,     Dialysis Method (if applicable):  N/A    CBC (last 72 hours):  Recent Labs   Lab Result Units 02/01/24  1955   WBC K/uL 13.57*   Hemoglobin g/dL 12.3   Hematocrit % 35.6*   Platelets K/uL 306   Gran % % 78.1*   Lymph % % 9.2*   Mono % % 12.0   Eosinophil % % 0.0   Basophil % % 0.3   Differential Method  Automated       Metabolic Panel (last 72 hours):  Recent Labs   Lab Result Units 02/01/24 1914 02/01/24 1955   Sodium mmol/L  --  128*   Potassium mmol/L  --  3.9   Chloride mmol/L  --  100   CO2 mmol/L  --  17*   Glucose mg/dL  --  500*   Glucose, UA  4+*  --    BUN mg/dL  --  10   Creatinine mg/dL  --  1.3   Albumin g/dL  --  3.4*   Total Bilirubin mg/dL  --  0.3   Alkaline Phosphatase U/L  --  93   AST U/L  --  14   ALT U/L  --  14       Drug levels (last 3 results):  No results for input(s): "VANCOMYCINRA", "VANCORANDOM", "VANCOMYCINPE", "VANCOPEAK", "VANCOMYCINTR", "VANCOTROUGH" in the last 72 hours.    Microbiologic Results:  Microbiology Results (last 7 days)       " Procedure Component Value Units Date/Time    Blood culture x two cultures. Draw prior to antibiotics. [[id number]]     Order Status: Canceled Specimen: Blood     Blood culture x two cultures. Draw prior to antibiotics. [[id number]]     Order Status: Canceled Specimen: Blood     Blood Culture #2 **CANNOT BE ORDERED STAT** [[id number]] Collected: 02/01/24 1955    Order Status: Sent Specimen: Blood from Peripheral, Antecubital, Left Updated: 02/2002    Blood Culture #1 **CANNOT BE ORDERED STAT** [[id number]] Collected: 02/01/24 1954    Order Status: Sent Specimen: Blood from Peripheral, Antecubital, Right Updated: 02/2002    Urine culture [[id number]] Collected: 02/01/24 1914    Order Status: No result Specimen: Urine Updated: 02/01/24 1933

## 2024-02-02 NOTE — SUBJECTIVE & OBJECTIVE
Interval History: NAEON. Has pain to vaginal area    Review of Systems   Constitutional:  Negative for chills and fever.   Respiratory:  Negative for shortness of breath.    Cardiovascular:  Negative for chest pain.   Gastrointestinal:  Negative for abdominal pain.   Genitourinary:  Positive for dysuria, vaginal discharge and vaginal pain.   Neurological:  Negative for headaches.   Psychiatric/Behavioral:  Negative for confusion.      Objective:     Vital Signs (Most Recent):  Temp: 98 °F (36.7 °C) (02/02/24 1122)  Pulse: 85 (02/02/24 1122)  Resp: 16 (02/02/24 1122)  BP: 113/66 (02/02/24 1122)  SpO2: 100 % (02/02/24 1122) Vital Signs (24h Range):  Temp:  [97.5 °F (36.4 °C)-98.4 °F (36.9 °C)] 98 °F (36.7 °C)  Pulse:  [] 85  Resp:  [14-20] 16  SpO2:  [96 %-100 %] 100 %  BP: ()/(52-77) 113/66     Weight: 59.3 kg (130 lb 12.8 oz)  Body mass index is 23.92 kg/m².    Intake/Output Summary (Last 24 hours) at 2/2/2024 1453  Last data filed at 2/2/2024 1330  Gross per 24 hour   Intake 360 ml   Output --   Net 360 ml         Physical Exam  Vitals and nursing note reviewed.   Constitutional:       General: She is not in acute distress.     Appearance: She is well-developed.   HENT:      Head: Normocephalic and atraumatic.   Eyes:      Conjunctiva/sclera: Conjunctivae normal.   Neck:      Vascular: No JVD.   Cardiovascular:      Rate and Rhythm: Normal rate and regular rhythm.      Heart sounds: Normal heart sounds.   Pulmonary:      Effort: Pulmonary effort is normal.      Breath sounds: Normal breath sounds.   Abdominal:      General: Bowel sounds are normal. There is no distension.      Palpations: Abdomen is soft.      Tenderness: There is no abdominal tenderness.   Genitourinary:     Labia:         Right: Lesion present.         Left: Lesion present.       Comments: Erythema of vulva/ labia major/ minor  Swelling of labia minora/ clitoral area  Moisture between labia majora/ minora  Labia majora with several  inner labial vesicular lesions   Musculoskeletal:      Cervical back: Neck supple.      Right lower leg: No edema.      Left lower leg: No edema.   Neurological:      Mental Status: She is alert.   Psychiatric:         Behavior: Behavior normal.             Significant Labs: All pertinent labs within the past 24 hours have been reviewed.  BMP:   Recent Labs   Lab 02/02/24  0432   *   *   K 4.0      CO2 13*   BUN 6   CREATININE 0.8   CALCIUM 8.3*     CBC:   Recent Labs   Lab 02/01/24 1955 02/02/24  0432   WBC 13.57* 10.69   HGB 12.3 11.3*   HCT 35.6* 33.9*    298       Significant Imaging: I have reviewed all pertinent imaging results/findings within the past 24 hours.

## 2024-02-02 NOTE — ASSESSMENT & PLAN NOTE
Do not suspect true UTI  Urinalysis showed 1+ leukocyte esterase, microscopy with 23 WBC, 8 RBC  Symptoms may maybe due to ulcers around the genital area  Fu with ucx

## 2024-02-02 NOTE — PROGRESS NOTES
VANCOMYCIN DOSING BY PHARMACY DISCONTINUATION NOTE    Rosemary Zee is a 23 y.o. female who had been consulted for vancomycin dosing.    The pharmacy consult for vancomycin dosing has been discontinued.     Vancomycin Dosing by Pharmacy Consult will sign-off. Please reconsult if necessary. Thank you for allowing us to participate in this patient's care.       Roxi Biswas, PharmD  [phone]

## 2024-02-02 NOTE — ASSESSMENT & PLAN NOTE
She has not been compliant with her insulin regimen  Consulted diabetic educator while inpatient.  Needs OP DM educator program

## 2024-02-02 NOTE — SUBJECTIVE & OBJECTIVE
Past Medical History:   Diagnosis Date    Diabetes mellitus        History reviewed. No pertinent surgical history.    Review of patient's allergies indicates:  No Known Allergies    No current facility-administered medications on file prior to encounter.     Current Outpatient Medications on File Prior to Encounter   Medication Sig    acyclovir (ZOVIRAX) 400 MG tablet Take 1 tablet (400 mg total) by mouth 3 (three) times daily. for 10 days    fluconazole (DIFLUCAN) 200 MG Tab Take 1 tablet (200 mg total) by mouth once daily. for 7 days    insulin detemir U-100 (LEVEMIR) 100 unit/mL injection Inject 8 Units into the skin every evening.     Family History       Problem Relation (Age of Onset)    Diabetes Mother, Maternal Grandfather, Paternal Grandmother    Heart attacks under age 50 Mother    Stroke Mother          Tobacco Use    Smoking status: Passive Smoke Exposure - Never Smoker    Smokeless tobacco: Never   Substance and Sexual Activity    Alcohol use: No    Drug use: No    Sexual activity: Yes     Partners: Male     Review of Systems   Constitutional:  Negative for appetite change, chills, fatigue and fever.   HENT:  Negative for congestion, ear discharge, ear pain and sore throat.    Eyes:  Negative for photophobia, redness and visual disturbance.   Respiratory:  Negative for apnea, cough, chest tightness, shortness of breath and wheezing.    Cardiovascular:  Negative for chest pain, palpitations and leg swelling.   Gastrointestinal:  Negative for abdominal distention, abdominal pain, blood in stool, constipation, diarrhea, nausea and vomiting.   Endocrine: Negative for cold intolerance and heat intolerance.   Genitourinary:  Positive for dysuria, genital sores and vaginal pain. Negative for difficulty urinating, flank pain, frequency and hematuria.   Musculoskeletal:  Negative for back pain and joint swelling.   Skin:  Negative for rash and wound.   Neurological:  Negative for dizziness, tremors, seizures,  syncope, weakness, light-headedness and headaches.   Hematological:  Positive for adenopathy.   Psychiatric/Behavioral:  Negative for behavioral problems, confusion, hallucinations and suicidal ideas.      Objective:     Vital Signs (Most Recent):  Temp: 97.5 °F (36.4 °C) (02/02/24 0157)  Pulse: 96 (02/02/24 0157)  Resp: 18 (02/02/24 0157)  BP: 113/70 (02/02/24 0157)  SpO2: 99 % (02/02/24 0157) Vital Signs (24h Range):  Temp:  [97.5 °F (36.4 °C)-97.9 °F (36.6 °C)] 97.5 °F (36.4 °C)  Pulse:  [] 96  Resp:  [14-20] 18  SpO2:  [96 %-100 %] 99 %  BP: ()/(52-77) 113/70     Weight: 59.3 kg (130 lb 12.8 oz)  Body mass index is 23.92 kg/m².     Physical Exam  Vitals reviewed.   Constitutional:       General: She is not in acute distress.     Appearance: Normal appearance. She is normal weight. She is not ill-appearing or toxic-appearing.   HENT:      Head: Normocephalic and atraumatic.      Nose: Nose normal. No congestion or rhinorrhea.      Mouth/Throat:      Mouth: Mucous membranes are dry.   Eyes:      Extraocular Movements: Extraocular movements intact.      Conjunctiva/sclera: Conjunctivae normal.      Pupils: Pupils are equal, round, and reactive to light.   Cardiovascular:      Rate and Rhythm: Normal rate and regular rhythm.      Pulses: Normal pulses.      Heart sounds: Normal heart sounds. No murmur heard.  Pulmonary:      Effort: Pulmonary effort is normal. No respiratory distress.      Breath sounds: Normal breath sounds. No stridor. No wheezing or rhonchi.   Abdominal:      General: Abdomen is flat. Bowel sounds are normal. There is no distension.      Palpations: Abdomen is soft.      Tenderness: There is no abdominal tenderness. There is no right CVA tenderness, left CVA tenderness, guarding or rebound.   Musculoskeletal:         General: No swelling or tenderness. Normal range of motion.      Cervical back: Normal range of motion and neck supple. No rigidity or tenderness.      Right lower leg:  No edema.      Left lower leg: No edema.   Skin:     General: Skin is warm and dry.      Coloration: Skin is not jaundiced.      Findings: Erythema and lesion (around the labial) present.   Neurological:      General: No focal deficit present.      Mental Status: She is alert and oriented to person, place, and time. Mental status is at baseline.      Cranial Nerves: No cranial nerve deficit.      Sensory: No sensory deficit.   Psychiatric:         Mood and Affect: Mood normal.         Behavior: Behavior normal.         Thought Content: Thought content normal.         Judgment: Judgment normal.              CRANIAL NERVES     CN III, IV, VI   Pupils are equal, round, and reactive to light.       Significant Labs:  Bilirubin:   Recent Labs   Lab 01/29/24  1320 02/01/24 1955   BILITOT 0.4 0.3     CBC:   Recent Labs   Lab 02/01/24 1955   WBC 13.57*   HGB 12.3   HCT 35.6*        CMP:   Recent Labs   Lab 02/01/24 1955   *   K 3.9      CO2 17*   *   BUN 10   CREATININE 1.3   CALCIUM 9.4   PROT 7.9   ALBUMIN 3.4*   BILITOT 0.3   ALKPHOS 93   AST 14   ALT 14   ANIONGAP 11     Lactic Acid:   Recent Labs   Lab 02/01/24 1955 02/01/24  2328   LACTATE 1.2 1.3     Urine Studies:   Recent Labs   Lab 02/01/24 1914   COLORU Colorless*   APPEARANCEUA Clear   PHUR 6.0   SPECGRAV 1.030   PROTEINUA Negative   GLUCUA 4+*   KETONESU 1+*   BILIRUBINUA Negative   OCCULTUA Trace*   NITRITE Negative   UROBILINOGEN Negative   LEUKOCYTESUR 1+*   RBCUA 8*   WBCUA 23*   BACTERIA Rare       Significant Imaging: I have reviewed all pertinent imaging results/findings within the past 24 hours.  Imaging Results              CT Abdomen Pelvis With IV Contrast NO Oral Contrast (Final result)  Result time 02/01/24 22:38:21      Final result by Ninoska Alaniz MD (02/01/24 22:38:21)                   Impression:      Mildly elongated right lobe of the liver.  Findings may suggest a Riedel's lobe versus mild  "hepatomegaly.    Mild bilateral inguinal adenopathy.  Question pelvic infection or other etiology.    Multiple tiny blood vessels seen involving vulva and perineum.      Electronically signed by: Ninoska Alaniz  Date:    02/01/2024  Time:    22:38               Narrative:    EXAMINATION:  CT OF ABDOMEN PELVIS WITH    CLINICAL HISTORY:  Abdominal abscess/infection suspected;Sepsis;    TECHNIQUE:  5 mm enhanced axial images were obtained from the lung bases through the greater trochanters.  Seventy-five mL of Omnipaque 350 was injected.    COMPARISON:  None.    FINDINGS:  The right lobe of the liver is mildly elongated.    Spleen, pancreas, kidneys, and adrenal glands are unremarkable. The gallbladder .    There is no definite evidence for abdominal adenopathy or ascites.    There is moderate colonic stool.    There are no pelvic masses.  Mild bilateral inguinal adenopathy is present.  There are multiple tiny blood vessels seen along the vulva and perineum.  There are not dilated as seen in varicosities.  The appendix is not inflamed.  The urinary bladder is moderately distended without wall thickening.    There is small free fluid in the pelvis.    There is mild bibasilar atelectasis.                                       X-Ray Chest AP Portable (Final result)  Result time 02/01/24 21:40:18      Final result by Sulaiman Enriquez MD (02/01/24 21:40:18)                   Impression:      No acute cardiopulmonary finding identified on this single view.      Electronically signed by: Sulaiman Enriquez MD  Date:    02/01/2024  Time:    21:40               Narrative:    EXAMINATION:  XR CHEST AP PORTABLE    CLINICAL HISTORY:  Provided history is "Sepsis;  ".    TECHNIQUE:  One view of the chest.    COMPARISON:  01/29/2024.    FINDINGS:  Cardiac silhouette is not enlarged.  No focal consolidation.  No sizable pleural effusion.  No pneumothorax.                                      "

## 2024-02-02 NOTE — ED PROVIDER NOTES
"Encounter Date: 2/1/2024       History     Chief Complaint   Patient presents with    Vaginal Pain     Pt reports having "white bumps" on vagina and swelling since Wednesday. Reports seen in ER 2 days for same s/s. Reports started taking Diflucan and Zovirax yesterday. Denies discharge, bleeding.      Cc: vaginal pain    Hpi:  Patient is a 22 y/o female with PMH of DM type 1, who presents to the emergency room for vaginal pain x 4 days associated with white labial bumps. Patient was initially seen on 1/29/24 at this location where she was treated for uncontrolled DM type 1. Patient reports non compliance with medication for DM. On 1/30/24 patient went to the [facility] in [place] where she received acyclovir  and fluconazole. Patient is still undergoing treatment but decided to come today because of the increase in vaginal pain.     The history is provided by the patient. No  was used.     Review of patient's allergies indicates:  No Known Allergies  Past Medical History:   Diagnosis Date    Diabetes mellitus      History reviewed. No pertinent surgical history.  Family History   Problem Relation Age of Onset    Diabetes Mother     Stroke Mother     Heart attacks under age 50 Mother     Diabetes Maternal Grandfather     Diabetes Paternal Grandmother     Arrhythmia Neg Hx     Early death Neg Hx     Pacemaker/defibrilator Neg Hx     Congenital heart disease Neg Hx      Social History     Tobacco Use    Smoking status: Passive Smoke Exposure - Never Smoker    Smokeless tobacco: Never   Substance Use Topics    Alcohol use: No    Drug use: No     Review of Systems   Constitutional:  Negative for chills, fatigue and fever.   HENT:  Negative for congestion, ear discharge, ear pain, postnasal drip, rhinorrhea, sinus pressure, sneezing, sore throat and voice change.    Eyes:  Negative for discharge and itching.   Respiratory:  Negative for cough, shortness of breath and wheezing.    Cardiovascular:  " Negative for chest pain, palpitations and leg swelling.   Gastrointestinal:  Negative for abdominal pain, constipation, diarrhea, nausea and vomiting.   Endocrine: Negative for polydipsia, polyphagia and polyuria.   Genitourinary:  Positive for vaginal pain. Negative for dysuria, frequency, hematuria, urgency, vaginal bleeding and vaginal discharge.   Musculoskeletal:  Negative for arthralgias and myalgias.   Skin:  Negative for rash and wound.   Neurological:  Negative for dizziness, seizures, syncope, weakness and numbness.   Hematological:  Negative for adenopathy. Does not bruise/bleed easily.   Psychiatric/Behavioral:  Negative for self-injury and suicidal ideas. The patient is not nervous/anxious.        Physical Exam     Initial Vitals [02/01/24 1842]   BP Pulse Resp Temp SpO2   122/77 110 20 97.9 °F (36.6 °C) 100 %      MAP       --         Physical Exam    Nursing note and vitals reviewed.  Constitutional: She appears well-developed and well-nourished.   HENT:   Head: Normocephalic and atraumatic.   Right Ear: External ear normal.   Left Ear: External ear normal.   Nose: Nose normal.   Eyes: Conjunctivae and EOM are normal. Pupils are equal, round, and reactive to light. Right eye exhibits no discharge. Left eye exhibits no discharge.   Neck:   Normal range of motion.  Abdominal: She exhibits no distension.   Genitourinary:    Pelvic exam was performed with patient in the knee-chest position.   There is rash and tenderness on the right labia. There is rash, tenderness and lesion on the left labia.    Genitourinary Comments: The entire genital region and perineum is denuded red swollen.     Musculoskeletal:         General: Normal range of motion.      Cervical back: Normal range of motion.     Neurological: She is alert and oriented to person, place, and time.   Skin: Skin is dry. Capillary refill takes less than 2 seconds.         ED Course   Critical Care    Date/Time: 2/1/2024 10:12 PM    Performed by:  Hernan Prater DNP  Authorized by: Jonathan Peng MD  Direct patient critical care time: 5 minutes  Additional history critical care time: 5 minutes  Ordering / reviewing critical care time: 5 minutes  Documentation critical care time: 5 minutes  Consulting other physicians critical care time: 5 minutes  Consult with family critical care time: 5 minutes  Total critical care time (exclusive of procedural time) : 30 minutes  Critical care time was exclusive of separately billable procedures and treating other patients and teaching time.  Critical care was necessary to treat or prevent imminent or life-threatening deterioration of the following conditions: sepsis, shock and toxidrome.  Critical care was time spent personally by me on the following activities: re-evaluation of patient's condition, review of old charts, ordering and review of radiographic studies, ordering and review of laboratory studies, ordering and performing treatments and interventions, obtaining history from patient or surrogate, examination of patient, evaluation of patient's response to treatment and discussions with primary provider.        Labs Reviewed   URINALYSIS, REFLEX TO URINE CULTURE - Abnormal; Notable for the following components:       Result Value    Color, UA Colorless (*)     Glucose, UA 4+ (*)     Ketones, UA 1+ (*)     Occult Blood UA Trace (*)     Leukocytes, UA 1+ (*)     All other components within normal limits    Narrative:     Specimen Source->Urine   URINALYSIS MICROSCOPIC - Abnormal; Notable for the following components:    RBC, UA 8 (*)     WBC, UA 23 (*)     All other components within normal limits    Narrative:     Specimen Source->Urine   CBC W/ AUTO DIFFERENTIAL - Abnormal; Notable for the following components:    WBC 13.57 (*)     Hematocrit 35.6 (*)     MCV 79 (*)     Gran # (ANC) 10.6 (*)     Immature Grans (Abs) 0.05 (*)     Mono # 1.6 (*)     Gran % 78.1 (*)     Lymph % 9.2 (*)     All other  components within normal limits   COMPREHENSIVE METABOLIC PANEL - Abnormal; Notable for the following components:    Sodium 128 (*)     CO2 17 (*)     Glucose 500 (*)     Albumin 3.4 (*)     All other components within normal limits    Narrative:       Glucose critical result(s) called and verbal readback obtained from   [facility]   by CD4 02/01/2024 20:43   BETA - HYDROXYBUTYRATE, SERUM - Abnormal; Notable for the following components:    Beta-Hydroxybutyrate 2.7 (*)     All other components within normal limits   SEDIMENTATION RATE - Abnormal; Notable for the following components:    Sed Rate 62 (*)     All other components within normal limits   C-REACTIVE PROTEIN - Abnormal; Notable for the following components:    .7 (*)     All other components within normal limits   CULTURE, URINE   CULTURE, BLOOD   CULTURE, BLOOD   LACTIC ACID, PLASMA   URINALYSIS, REFLEX TO URINE CULTURE   LACTIC ACID, PLASMA   POCT URINE PREGNANCY   POCT GLUCOSE, HAND-HELD DEVICE          Imaging Results              CT Abdomen Pelvis With IV Contrast NO Oral Contrast (Final result)  Result time 02/01/24 22:38:21      Final result by Ninoska Alaniz MD (02/01/24 22:38:21)                   Impression:      Mildly elongated right lobe of the liver.  Findings may suggest a Riedel's lobe versus mild hepatomegaly.    Mild bilateral inguinal adenopathy.  Question pelvic infection or other etiology.    Multiple tiny blood vessels seen involving vulva and perineum.      Electronically signed by: Ninoska Alaniz  Date:    02/01/2024  Time:    22:38               Narrative:    EXAMINATION:  CT OF ABDOMEN PELVIS WITH    CLINICAL HISTORY:  Abdominal abscess/infection suspected;Sepsis;    TECHNIQUE:  5 mm enhanced axial images were obtained from the lung bases through the greater trochanters.  Seventy-five mL of Omnipaque 350 was injected.    COMPARISON:  None.    FINDINGS:  The right lobe of the liver is mildly elongated.    Spleen,  "pancreas, kidneys, and adrenal glands are unremarkable. The gallbladder .    There is no definite evidence for abdominal adenopathy or ascites.    There is moderate colonic stool.    There are no pelvic masses.  Mild bilateral inguinal adenopathy is present.  There are multiple tiny blood vessels seen along the vulva and perineum.  There are not dilated as seen in varicosities.  The appendix is not inflamed.  The urinary bladder is moderately distended without wall thickening.    There is small free fluid in the pelvis.    There is mild bibasilar atelectasis.                                       X-Ray Chest AP Portable (Final result)  Result time 02/01/24 21:40:18      Final result by Sulaiman Enriquez MD (02/01/24 21:40:18)                   Impression:      No acute cardiopulmonary finding identified on this single view.      Electronically signed by: Sulaiman Enriquez MD  Date:    02/01/2024  Time:    21:40               Narrative:    EXAMINATION:  XR CHEST AP PORTABLE    CLINICAL HISTORY:  Provided history is "Sepsis;  ".    TECHNIQUE:  One view of the chest.    COMPARISON:  01/29/2024.    FINDINGS:  Cardiac silhouette is not enlarged.  No focal consolidation.  No sizable pleural effusion.  No pneumothorax.                                       Medications   vancomycin - pharmacy to dose (has no administration in time range)   vancomycin 1,250 mg in dextrose 5 % (D5W) 250 mL IVPB (Vial-Mate) (1,250 mg Intravenous Trough Due As Scheduled Before Dose 2/3/24 2100)   sodium chloride 0.9% bolus 1,000 mL 1,000 mL (0 mLs Intravenous Stopped 2/1/24 2055)   piperacillin-tazobactam (ZOSYN) 4.5 g in dextrose 5 % in water (D5W) 100 mL IVPB (MB+) (0 g Intravenous Stopped 2/1/24 2055)   clindamycin in D5W 900 mg/50 mL IVPB 900 mg (0 mg Intravenous Stopped 2/1/24 2154)   ondansetron injection 4 mg (4 mg Intravenous Given 2/1/24 1959)   morphine injection 6 mg (6 mg Intravenous Given 2/1/24 1958)   vancomycin 1,250 mg in " dextrose 5 % (D5W) 250 mL IVPB (Vial-Mate) (1,250 mg Intravenous New Bag 2/1/24 2154)   insulin regular injection 6 Units 0.06 mL (6 Units Intravenous Given 2/1/24 2112)   sodium chloride 0.9% bolus 878 mL 878 mL (0 mLs Intravenous Stopped 2/1/24 2230)   morphine injection 4 mg (4 mg Intravenous Given 2/1/24 2158)   iohexoL (OMNIPAQUE 350) injection 75 mL (75 mLs Intravenous Given 2/1/24 2213)     Medical Decision Making  23-year-old female presents the emergency department complaining of vaginal pain.  She reports she has had 4 days of white labial bumps seen on January 30th and treated with acyclovir and fluconazole states that the problem worsened.  She has a diabetic with very poor blood sugar control.  Currently afebrile and nontoxic in appearance.  On exam the entire genital region and perineum is red swollen and denuded.  Upon examining the patient I immediately summoned Dr. Joseph who attended to the patient and concurred that this was a concerning presentation.  My differentials include but are not limited to Kathy's gangrene, vaginal candidiasis, herpes genitalis    Problems Addressed:  Elevated C-reactive protein (CRP): acute illness or injury  High blood ketone body measurement: acute illness or injury  Hyperglycemia: acute illness or injury     Details: Treated with IV insulin and fluids  Sepsis: acute illness or injury     Details: As evidenced by white blood cell greater than 13 known source of infection heart rate greater than 90  Vaginal candidiasis: acute illness or injury     Details: Patient is already taking Diflucan.    Amount and/or Complexity of Data Reviewed  Labs: ordered. Decision-making details documented in ED Course.  Radiology: ordered. Decision-making details documented in ED Course.  Discussion of management or test interpretation with external provider(s): SBAR given to Dr. Montero who accepted this patient for inpatient placement.    Risk  OTC drugs.  Prescription drug  management.  Decision regarding hospitalization.               ED Course as of 02/01/24 2326   u Feb 01, 2024 1924 BP: 122/77 [VC]   1924 Temp: 97.9 °F (36.6 °C) [VC]   1924 Temp Source: Oral [VC]   1924 Pulse: 110 [VC]   1924 Resp: 20 [VC]   1924 SpO2: 100 % [VC]   2054 CRP(!): 156.7  Elevated. [VC]   2054 Lactic Acid Level: 1.2 [VC]   2054 Beta-Hydroxybutyrate(!): 2.7  Elevated beta hydroxy. [VC]   2055 CBC auto differential(!)  Wbc indicates sepsis.  Mild anemia, normal platelet count. [VC]   2055 Comprehensive metabolic panel(!!)  Known hyperglycemia.   [VC]   2055 Albumin(!): 3.4 [VC]   2058 BP: 106/62 [VC]   2059 Pulse: 95 [VC]   2059 SpO2: 100 % [VC]   2115 Sed Rate(!): 62  Elevated. [VC]   2115 BP: 100/62 [VC]   2115 Pulse: 92 [VC]   2115 SpO2: 100 % [VC]   2147 BP: 98/61 [VC]   2147 Pulse: 96 [VC]   2147 SpO2: 100 % [VC]   2212 hCG Qualitative, Urine: Negative [VC]   2212 BP: 105/70 [VC]   2212 Pulse: 95 [VC]   2212 Resp: 17 [VC]   2212 SpO2: 96 % [VC]   2212 X-Ray Chest AP Portable    No acute cardiopulmonary finding identified on this single view.    [VC]   2239 BP(!): 96/52 [VC]   2239 Pulse: 93 [VC]   2239 Resp: 14 [VC]   2239 SpO2: 100 % [VC]   2304 CT Abdomen Pelvis With IV Contrast NO Oral Contrast  Mildly elongated right lobe of the liver.  Findings may suggest a Riedel's lobe versus mild hepatomegaly.     Mild bilateral inguinal adenopathy.  Question pelvic infection or other etiology.     Multiple tiny blood vessels seen involving vulva and perineum.   [VC]   2313 BP: 97/61 [VC]   2313 Pulse: 91 [VC]   2313 Resp: 18 [VC]   2313 SpO2: 99 % [VC]      ED Course User Index  [VC] Champagne, Hernan A., DNP                           Clinical Impression:  Final diagnoses:  [B37.31] Vaginal candidiasis (Primary)  [A41.9] Sepsis  [R73.9] Hyperglycemia  [R79.89] High blood ketone body measurement  [R79.82] Elevated C-reactive protein (CRP)          ED Disposition Condition    Admit Stable                 Hernan Prater, CHING  02/01/24 [id number]

## 2024-02-02 NOTE — ADMISSIONCARE
AdmissionCare    Guideline: Sepsis (and Other Febrile Illness without Focal Infection) - INPT, Inpatient    Based on the indications selected for the patient, the bed status of Inpatient was determined to be MET    The following indications were selected as present at the time of evaluation of the patient:      - Parenteral antimicrobial regimen that must be implemented on inpatient basis (eg, infusion or monitoring needs beyond capabilities of outpatient parenteral therapy)    AdmissionCare documentation entered by: Annie Hicks    [facility], 27th edition, Copyright © 2023 [facility], [facility] All Rights Reserved.  [id number]

## 2024-02-02 NOTE — CONSULTS
"[facility] - Med Surg  Wound Care  WOC REJI    Patient Name:  Rosemary Zee   MRN:  [id number]  Date: 2/2/2024  Diagnosis: <principal problem not specified>    History:     Past Medical History:   Diagnosis Date    Diabetes mellitus        Social History     Socioeconomic History    Marital status: Single   Tobacco Use    Smoking status: Passive Smoke Exposure - Never Smoker    Smokeless tobacco: Never   Substance and Sexual Activity    Alcohol use: No    Drug use: No    Sexual activity: Yes     Partners: Male   Social History Narrative    Lives at home with mom and mom's boyfriend. Has siblings that do not live with her, but they are healthy per report.    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       Precautions:     Allergies as of 02/01/2024    (No Known Allergies)       [facility] Assessment Details/Treatment     Active Problem List with Overview Notes    Diagnosis Date Noted    UTI (urinary tract infection) 02/02/2024    Labial infection 02/02/2024    Kathy disease of vagina and vulva 02/02/2024    Screening examination for STD (sexually transmitted disease) 01/30/2024    Insulin pump status 10/21/2020    Insulin pump titration 10/21/2020    Pain in toe 06/24/2020    Uncontrolled type 1 diabetes mellitus with hyperglycemia 04/14/2020    Tension headache 09/11/2018    Chest pain     Non-compliance 01/19/2018    Bilateral headache 05/22/2017    Type 1 diabetes mellitus with hyperglycemia 08/30/2016    Refractive error 01/06/2015    Disorder of refraction 01/06/2015      Consulted for cellulitis labia  A 23 year old female admitted 2/1/24 from home with complaint "white bumps" on vagina and swelling. Seen in ED and started Diflucan and Zovirax 1/31. History DM I and medication non adherence with prior admission for DKA  2/2 WBC 10.69 Hgb 11.3 Hct 33.9 Alb 3.0 Weight 130 lbs   10/21/20 A1C >14.0  On Isoflex mattress; Josh score 20  2/2 2:00 am 4 Eyes Skin Assessment- No Pressure " Injuries  Assessment:  Labia- edematous and painful to touch. Dark discoloration skin of buttocks with discomfort . Reports having a vaginal discharge. States miconazole powder providing comfort.  Patient reports not having a recent A1C. Noted awaiting results for A1C.  Treatment/Plan:  Local wound care/cleansing with Vashe before applying miconazole powder. Nursing can put Vashe in peribottle and cleanse area while on toilet, bedpan, or on wick away pad. Discussed with patient and nursing.  Encouraged BG management for healing.  Recommendations made to primary team. Orders placed.     02/02/2024

## 2024-02-02 NOTE — NURSING
Report received and care assumed. Discussed plan of care and safety with patient . Reviewed call system. No acute distress noted. Excessive amount  drainage noted to vaginal area. Swelling to labia majora.  [facility]      2/2/2024       Skin assessed on: Q Shift      [x] No Pressure Injuries Present    []Prevention Measures Documented    [] Yes LDA  for Pressure Injury Previously documented     [] Yes New Pressure Injury Discovered   [] LDA for New Pressure Injury Added      Attending RN:  Lauren Brody RN     Second RN:  Marlin Muñoz RN

## 2024-02-02 NOTE — PROGRESS NOTES
Vancomycin consult follow-up:    Patient reviewed, renal function stable, no new levels, continue current therapy; Next levels due: trough due 2/3/2024 at 2100

## 2024-02-02 NOTE — MEDICAL/APP STUDENT
"  History     Chief Complaint   Patient presents with    Vaginal Pain     Pt reports having "white bumps" on vagina and swelling since Wednesday. Reports seen in ER 2 days for same s/s. Reports started taking Diflucan and Zovirax yesterday. Denies discharge, bleeding.      Patient is a 22 y/o female with PMH of DM type 1, who presents to the emergency room for vaginal pain x 4 days associated with white labial bumps. Patient was initially seen on 1/29/24 at this location where she was treated for uncontrolled DM type 1. Patient reports non compliance with medication for DM. On 1/30/24 patient went to the [facility] in [place] where she received acyclovir  and fluconazole. Patient is still undergoing treatment but decided to come today because of the increase in vaginal pain.           Past Medical History:   Diagnosis Date    Diabetes mellitus        No past surgical history on file.    Family History   Problem Relation Age of Onset    Diabetes Mother     Stroke Mother     Heart attacks under age 50 Mother     Diabetes Maternal Grandfather     Diabetes Paternal Grandmother     Arrhythmia Neg Hx     Early death Neg Hx     Pacemaker/defibrilator Neg Hx     Congenital heart disease Neg Hx        Social History     Tobacco Use    Smoking status: Passive Smoke Exposure - Never Smoker    Smokeless tobacco: Never   Substance Use Topics    Alcohol use: No    Drug use: No       Review of Systems   Constitutional:  Negative for fever.   HENT:  Negative for sore throat.    Respiratory:  Negative for shortness of breath.    Cardiovascular:  Negative for chest pain.   Gastrointestinal:  Negative for abdominal pain, diarrhea and nausea.   Genitourinary:  Positive for vaginal pain. Negative for dysuria, hematuria, pelvic pain, vaginal bleeding and vaginal discharge.       Physical Exam   /77 (BP Location: Right arm, Patient Position: Sitting)   Pulse 110   Temp 97.9 °F (36.6 °C) (Oral)   Resp 20   Ht 5' 2" (1.575 m)   Wt " 62.6 kg (138 lb)   LMP 12/19/2023 (Approximate)   SpO2 100%   BMI 25.24 kg/m²     Physical Exam    ED Course

## 2024-02-02 NOTE — ASSESSMENT & PLAN NOTE
Presented with bilateral labia swelling, redness and purulent discharge   Will send for culture   We will screening for STI at this time   Gonorrhea/chlamydia DNA pcr  Will treat with empiric antibiotics till result is obtained  RPR was nonreactive.

## 2024-02-02 NOTE — NURSING
Pt. Arrived to floor via w/c. Oriented to unit and room with verbal understanding assessed. Dr. Jimenez made aware of pt.'s arrival to floor and came to room to see pt. And discuss plan of care. Assessment per flowsheet complete. All questions answered. IV to left AC, D&I with no s/s of infection assessed. Pt. C/o pain to rt. AC IV. IV  discontinued, no s/s of infection assessed. Pt. C/o pain 9/10 to vaginal area. Will medicate once orders are placed. Call light and belongings within reach, bed in low position. Will continue to monitor. [facility]  Nurses Note -- 4 Eyes      2/2/2024       Skin assessed on: Admit      [x] No Pressure Injuries Present    [x]Prevention Measures Documented    [] Yes LDA  for Pressure Injury Previously documented     [] Yes New Pressure Injury Discovered   [] LDA for New Pressure Injury Added      Attending RN:  Marlin Muñoz, RN     Second RN:  Renita OREILLY

## 2024-02-02 NOTE — PROGRESS NOTES
[facility] Medicine  Progress Note    Patient Name: Rosemary Zee  MRN: [id number]  Patient Class: IP- Inpatient   Admission Date: 2/1/2024  Length of Stay: 1 days  Attending Physician: Georgiana Jang MD  Primary Care Provider: Serenity, Primary Doctor      Subjective:     Principal Problem:Labial infection      HPI:  23-year-old  female with medical history significant for type 1 diabetes mellitus,medication non adherence with prior admission for DKA,who presented to the ED with whitish vaginal discharge,redness and swelling of her jake vaginal area, she first noticed the ' whitish bump' 5 days prior to presentation and was seen at an outside ED where acyclovir and fluconazole was prescribed but did not start taking them until a day prior to this presentation,she denied associated fever,chills or rigor,noticed dysuria which she attributed to the ulcer,no frequency,urgency,supra pubic pain or hematuria,she denied any abdominal pain,no nausea or vomiting,no change in bowel habit,denied diarrhea or constipation,she's sexually active and voiced using condom consistently,no previous history of STI.She voiced poorly controled diabetes, with random blood glucose mostly in the 300s,takes 6 units of Lantus twice daily.  Labs obtained in the ED showed  white blood cell count of 13.57,anion gap is 11. Lactic acid 1.2,beta hydroxy was elevated at 2.7. ESR 62 and .7, Urinalysis showed wbc 23 and 8 RBCs. RPR was non-reactive,CT abdominal pelvis showed no acute abnormality.          Overview/Hospital Course:  No notes on file    Interval History: NAEON. Has pain to vaginal area    Review of Systems   Constitutional:  Negative for chills and fever.   Respiratory:  Negative for shortness of breath.    Cardiovascular:  Negative for chest pain.   Gastrointestinal:  Negative for abdominal pain.   Genitourinary:  Positive for dysuria, vaginal discharge and vaginal pain.   Neurological:   Negative for headaches.   Psychiatric/Behavioral:  Negative for confusion.      Objective:     Vital Signs (Most Recent):  Temp: 98 °F (36.7 °C) (02/02/24 1122)  Pulse: 85 (02/02/24 1122)  Resp: 16 (02/02/24 1122)  BP: 113/66 (02/02/24 1122)  SpO2: 100 % (02/02/24 1122) Vital Signs (24h Range):  Temp:  [97.5 °F (36.4 °C)-98.4 °F (36.9 °C)] 98 °F (36.7 °C)  Pulse:  [] 85  Resp:  [14-20] 16  SpO2:  [96 %-100 %] 100 %  BP: ()/(52-77) 113/66     Weight: 59.3 kg (130 lb 12.8 oz)  Body mass index is 23.92 kg/m².    Intake/Output Summary (Last 24 hours) at 2/2/2024 1453  Last data filed at 2/2/2024 1330  Gross per 24 hour   Intake 360 ml   Output --   Net 360 ml         Physical Exam  Vitals and nursing note reviewed.   Constitutional:       General: She is not in acute distress.     Appearance: She is well-developed.   HENT:      Head: Normocephalic and atraumatic.   Eyes:      Conjunctiva/sclera: Conjunctivae normal.   Neck:      Vascular: No JVD.   Cardiovascular:      Rate and Rhythm: Normal rate and regular rhythm.      Heart sounds: Normal heart sounds.   Pulmonary:      Effort: Pulmonary effort is normal.      Breath sounds: Normal breath sounds.   Abdominal:      General: Bowel sounds are normal. There is no distension.      Palpations: Abdomen is soft.      Tenderness: There is no abdominal tenderness.   Genitourinary:     Labia:         Right: Lesion present.         Left: Lesion present.       Comments: Erythema of vulva/ labia major/ minor  Swelling of labia minora/ clitoral area  Moisture between labia majora/ minora  Labia majora with several inner labial vesicular lesions   Musculoskeletal:      Cervical back: Neck supple.      Right lower leg: No edema.      Left lower leg: No edema.   Neurological:      Mental Status: She is alert.   Psychiatric:         Behavior: Behavior normal.             Significant Labs: All pertinent labs within the past 24 hours have been reviewed.  BMP:   Recent Labs    Lab 02/02/24  0432   *   *   K 4.0      CO2 13*   BUN 6   CREATININE 0.8   CALCIUM 8.3*     CBC:   Recent Labs   Lab 02/01/24  1955 02/02/24  0432   WBC 13.57* 10.69   HGB 12.3 11.3*   HCT 35.6* 33.9*    298       Significant Imaging: I have reviewed all pertinent imaging results/findings within the past 24 hours.    Assessment/Plan:      * Labial infection  Suspect herpes simplex outbreak. Check HSV 1&2 titers  Start Valtrex, continue antifungals    UTI (urinary tract infection)  Do not suspect true UTI  Urinalysis showed 1+ leukocyte esterase, microscopy with 23 WBC, 8 RBC  Symptoms may maybe due to ulcers around the genital area  Fu with ucx       Screening examination for STD (sexually transmitted disease)  F/u STD panel       Non-compliance  She has not been compliant with her insulin regimen  Consulted diabetic educator while inpatient.  Needs OP DM educator program      Type 1 diabetes mellitus with hyperglycemia  Patient's FSGs are uncontrolled due to hyperglycemia on current medication regimen.  Last A1c reviewed-   Lab Results   Component Value Date    HGBA1C >14.0 (H) 10/21/2020     Most recent fingerstick glucose reviewed-   Recent Labs   Lab 02/01/24  2322 02/02/24  0201 02/02/24  0727 02/02/24  1123   POCTGLUCOSE 224* 265* 330* 253*       Current correctional scale: high  Increase anti-hyperglycemic dose as follows-   Antihyperglycemics (From admission, onward)      Start     Stop Route Frequency Ordered    02/02/24 2100  insulin detemir U-100 (Levemir) pen 15 Units         -- SubQ Nightly 02/02/24 1457    02/02/24 1600  insulin detemir U-100 (Levemir) pen 25 Units         -- SubQ Daily 02/02/24 1457    02/02/24 0841  insulin aspart U-100 pen 1-15 Units         -- SubQ Before meals & nightly PRN 02/02/24 0842          Hold Oral hypoglycemics while patient is in the hospital.      VTE Risk Mitigation (From admission, onward)           Ordered     heparin (porcine) injection  5,000 Units  Every 8 hours         02/02/24 0244     IP VTE LOW RISK PATIENT  Once         02/02/24 0244     Place sequential compression device  Until discontinued         02/02/24 0244                    Discharge Planning   CONSUELO:      Code Status: Full Code   Is the patient medically ready for discharge?:     Reason for patient still in hospital (select all that apply): Patient trending condition, Laboratory test, and Treatment  Discharge Plan A: Home, Home with family          Georgiana Jang MD  Department of Hospital Medicine   [facility]

## 2024-02-02 NOTE — PLAN OF CARE
Case Management Assessment     PCP: Marlys Carreon MD    Pharmacy: [facility] in [place].    Patient Arrived From: home  Existing Help at Home: mother    Barriers to Discharge: Requires medical treatment    Discharge Plan:    A. Home with family   B. Home with family      [place]  Type I diabetic. Has a glucometer.  Obtains depo shots every 3 months for birth control.  Mother will transport home.  Never had a yeast infection before.   Would benefit from a disbetic educator.         02/02/24 1239   Discharge Assessment   Assessment Type Discharge Planning Assessment   Confirmed/corrected address, phone number and insurance Yes   Confirmed Demographics Correct on Facesheet   Source of Information patient;family   Reason For Admission yeast infection   People in Home parent(s)   Do you expect to return to your current living situation? Yes   Do you have help at home or someone to help you manage your care at home? Yes   Who are your caregiver(s) and their phone number(s)? Sharon Zee (Mother) [phone] (Mobile)   Prior to hospitilization cognitive status: Alert/Oriented   Current cognitive status: Alert/Oriented   Equipment Currently Used at Home none   Readmission within 30 days? No   Do you currently have service(s) that help you manage your care at home? No   Do you take prescription medications? Yes   Do you have prescription coverage? Yes   Coverage MEDICAID [facility] -   Who is going to help you get home at discharge? Sharon Zee (Mother) [phone] (Mobile)   How do you get to doctors appointments? family or friend will provide;car, drives self   Are you on dialysis? No   Do you take coumadin? No   Discharge Plan A Home;Home with family   Discharge Plan B Home;Home with family;Home Health   DME Needed Upon Discharge  none   Discharge Plan discussed with: Patient   Transition of Care Barriers None   OTHER   Name(s) of People in Home Sharon Zee (Mother)  [phone] (Mobile

## 2024-02-02 NOTE — ASSESSMENT & PLAN NOTE
Patient's FSGs are uncontrolled due to hyperglycemia on current medication regimen.  Last A1c reviewed-   Lab Results   Component Value Date    HGBA1C >14.0 (H) 10/21/2020     Most recent fingerstick glucose reviewed-   Recent Labs   Lab 02/01/24  2322 02/02/24  0201 02/02/24  0727 02/02/24  1123   POCTGLUCOSE 224* 265* 330* 253*       Current correctional scale: high  Increase anti-hyperglycemic dose as follows-   Antihyperglycemics (From admission, onward)      Start     Stop Route Frequency Ordered    02/02/24 2100  insulin detemir U-100 (Levemir) pen 15 Units         -- SubQ Nightly 02/02/24 1457    02/02/24 1600  insulin detemir U-100 (Levemir) pen 25 Units         -- SubQ Daily 02/02/24 1457    02/02/24 0841  insulin aspart U-100 pen 1-15 Units         -- SubQ Before meals & nightly PRN 02/02/24 0842          Hold Oral hypoglycemics while patient is in the hospital.

## 2024-02-02 NOTE — ASSESSMENT & PLAN NOTE
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  With low tolerance to consult Urology if no improvement.

## 2024-02-02 NOTE — ASSESSMENT & PLAN NOTE
She voiced being sexually active   No prior history of STI  Will screen for gonorrhea and chlamydia at this time   As stated above RPR was nonreactive.  Started on broad-spectrum antibiotics due to concern for Kathy's gangrene

## 2024-02-03 LAB
ALBUMIN SERPL BCP-MCNC: 2.5 G/DL (ref 3.5–5.2)
ALP SERPL-CCNC: 96 U/L (ref 55–135)
ALT SERPL W/O P-5'-P-CCNC: 23 U/L (ref 10–44)
ANION GAP SERPL CALC-SCNC: 9 MMOL/L (ref 8–16)
AST SERPL-CCNC: 35 U/L (ref 10–40)
BASOPHILS # BLD AUTO: 0.04 K/UL (ref 0–0.2)
BASOPHILS NFR BLD: 0.6 % (ref 0–1.9)
BILIRUB SERPL-MCNC: 0.2 MG/DL (ref 0.1–1)
BUN SERPL-MCNC: 6 MG/DL (ref 6–20)
CALCIUM SERPL-MCNC: 8.4 MG/DL (ref 8.7–10.5)
CHLORIDE SERPL-SCNC: 107 MMOL/L (ref 95–110)
CO2 SERPL-SCNC: 19 MMOL/L (ref 23–29)
CREAT SERPL-MCNC: 0.7 MG/DL (ref 0.5–1.4)
DIFFERENTIAL METHOD BLD: ABNORMAL
EOSINOPHIL # BLD AUTO: 0.1 K/UL (ref 0–0.5)
EOSINOPHIL NFR BLD: 0.8 % (ref 0–8)
ERYTHROCYTE [DISTWIDTH] IN BLOOD BY AUTOMATED COUNT: 11.7 % (ref 11.5–14.5)
EST. GFR  (NO RACE VARIABLE): >60 ML/MIN/1.73 M^2
GLUCOSE SERPL-MCNC: 235 MG/DL (ref 70–110)
HCT VFR BLD AUTO: 30.7 % (ref 37–48.5)
HGB BLD-MCNC: 10.6 G/DL (ref 12–16)
IMM GRANULOCYTES # BLD AUTO: 0.04 K/UL (ref 0–0.04)
IMM GRANULOCYTES NFR BLD AUTO: 0.6 % (ref 0–0.5)
LYMPHOCYTES # BLD AUTO: 2.8 K/UL (ref 1–4.8)
LYMPHOCYTES NFR BLD: 42.6 % (ref 18–48)
MCH RBC QN AUTO: 28.3 PG (ref 27–31)
MCHC RBC AUTO-ENTMCNC: 34.5 G/DL (ref 32–36)
MCV RBC AUTO: 82 FL (ref 82–98)
MONOCYTES # BLD AUTO: 0.6 K/UL (ref 0.3–1)
MONOCYTES NFR BLD: 9 % (ref 4–15)
NEUTROPHILS # BLD AUTO: 3.1 K/UL (ref 1.8–7.7)
NEUTROPHILS NFR BLD: 46.4 % (ref 38–73)
NRBC BLD-RTO: 0 /100 WBC
PLATELET # BLD AUTO: 296 K/UL (ref 150–450)
PMV BLD AUTO: 10.1 FL (ref 9.2–12.9)
POCT GLUCOSE: 191 MG/DL (ref 70–110)
POCT GLUCOSE: 222 MG/DL (ref 70–110)
POCT GLUCOSE: 309 MG/DL (ref 70–110)
POCT GLUCOSE: 321 MG/DL (ref 70–110)
POTASSIUM SERPL-SCNC: 3.3 MMOL/L (ref 3.5–5.1)
PROT SERPL-MCNC: 5.8 G/DL (ref 6–8.4)
RBC # BLD AUTO: 3.75 M/UL (ref 4–5.4)
SODIUM SERPL-SCNC: 135 MMOL/L (ref 136–145)
WBC # BLD AUTO: 6.57 K/UL (ref 3.9–12.7)

## 2024-02-03 PROCEDURE — 11000001 HC ACUTE MED/SURG PRIVATE ROOM

## 2024-02-03 PROCEDURE — 36415 COLL VENOUS BLD VENIPUNCTURE: CPT | Performed by: STUDENT IN AN ORGANIZED HEALTH CARE EDUCATION/TRAINING PROGRAM

## 2024-02-03 PROCEDURE — 25000003 PHARM REV CODE 250: Performed by: STUDENT IN AN ORGANIZED HEALTH CARE EDUCATION/TRAINING PROGRAM

## 2024-02-03 PROCEDURE — 63600175 PHARM REV CODE 636 W HCPCS: Performed by: STUDENT IN AN ORGANIZED HEALTH CARE EDUCATION/TRAINING PROGRAM

## 2024-02-03 PROCEDURE — 94761 N-INVAS EAR/PLS OXIMETRY MLT: CPT

## 2024-02-03 PROCEDURE — 80053 COMPREHEN METABOLIC PANEL: CPT | Performed by: STUDENT IN AN ORGANIZED HEALTH CARE EDUCATION/TRAINING PROGRAM

## 2024-02-03 PROCEDURE — 25000003 PHARM REV CODE 250: Performed by: FAMILY MEDICINE

## 2024-02-03 PROCEDURE — 85025 COMPLETE CBC W/AUTO DIFF WBC: CPT | Performed by: STUDENT IN AN ORGANIZED HEALTH CARE EDUCATION/TRAINING PROGRAM

## 2024-02-03 PROCEDURE — 63600175 PHARM REV CODE 636 W HCPCS: Performed by: INTERNAL MEDICINE

## 2024-02-03 PROCEDURE — 25000003 PHARM REV CODE 250: Performed by: INTERNAL MEDICINE

## 2024-02-03 RX ORDER — POTASSIUM CHLORIDE 20 MEQ/1
40 TABLET, EXTENDED RELEASE ORAL ONCE
Status: COMPLETED | OUTPATIENT
Start: 2024-02-03 | End: 2024-02-03

## 2024-02-03 RX ADMIN — SODIUM CHLORIDE, POTASSIUM CHLORIDE, SODIUM LACTATE AND CALCIUM CHLORIDE: 600; 310; 30; 20 INJECTION, SOLUTION INTRAVENOUS at 02:02

## 2024-02-03 RX ADMIN — SODIUM CHLORIDE, POTASSIUM CHLORIDE, SODIUM LACTATE AND CALCIUM CHLORIDE: 600; 310; 30; 20 INJECTION, SOLUTION INTRAVENOUS at 11:02

## 2024-02-03 RX ADMIN — POTASSIUM CHLORIDE 40 MEQ: 1500 TABLET, EXTENDED RELEASE ORAL at 06:02

## 2024-02-03 RX ADMIN — PIPERACILLIN AND TAZOBACTAM 4.5 G: 4; .5 INJECTION, POWDER, LYOPHILIZED, FOR SOLUTION INTRAVENOUS; PARENTERAL at 01:02

## 2024-02-03 RX ADMIN — HEPARIN SODIUM 5000 UNITS: 5000 INJECTION INTRAVENOUS; SUBCUTANEOUS at 01:02

## 2024-02-03 RX ADMIN — INSULIN ASPART 3 UNITS: 100 INJECTION, SOLUTION INTRAVENOUS; SUBCUTANEOUS at 12:02

## 2024-02-03 RX ADMIN — FLUCONAZOLE 200 MG: 2 INJECTION, SOLUTION INTRAVENOUS at 04:02

## 2024-02-03 RX ADMIN — INSULIN ASPART 12 UNITS: 100 INJECTION, SOLUTION INTRAVENOUS; SUBCUTANEOUS at 09:02

## 2024-02-03 RX ADMIN — INSULIN DETEMIR 28 UNITS: 100 INJECTION, SOLUTION SUBCUTANEOUS at 08:02

## 2024-02-03 RX ADMIN — VALACYCLOVIR HYDROCHLORIDE 1000 MG: 500 TABLET, FILM COATED ORAL at 09:02

## 2024-02-03 RX ADMIN — PIPERACILLIN AND TAZOBACTAM 4.5 G: 4; .5 INJECTION, POWDER, LYOPHILIZED, FOR SOLUTION INTRAVENOUS; PARENTERAL at 06:02

## 2024-02-03 RX ADMIN — INSULIN ASPART 6 UNITS: 100 INJECTION, SOLUTION INTRAVENOUS; SUBCUTANEOUS at 08:02

## 2024-02-03 RX ADMIN — HYDROCODONE BITARTRATE AND ACETAMINOPHEN 1 TABLET: 5; 325 TABLET ORAL at 12:02

## 2024-02-03 RX ADMIN — VALACYCLOVIR HYDROCHLORIDE 1000 MG: 500 TABLET, FILM COATED ORAL at 08:02

## 2024-02-03 RX ADMIN — MICONAZOLE NITRATE: 20 POWDER TOPICAL at 08:02

## 2024-02-03 RX ADMIN — INSULIN ASPART 12 UNITS: 100 INJECTION, SOLUTION INTRAVENOUS; SUBCUTANEOUS at 04:02

## 2024-02-03 RX ADMIN — MICONAZOLE NITRATE: 20 POWDER TOPICAL at 09:02

## 2024-02-03 RX ADMIN — HEPARIN SODIUM 5000 UNITS: 5000 INJECTION INTRAVENOUS; SUBCUTANEOUS at 05:02

## 2024-02-03 RX ADMIN — HEPARIN SODIUM 5000 UNITS: 5000 INJECTION INTRAVENOUS; SUBCUTANEOUS at 09:02

## 2024-02-03 NOTE — NURSING
[facility] [place]  Nurses Note -- 4 Eyes      2/2/2024       Skin assessed on: Q Shift      [x] No Pressure Injuries Present    [x]Prevention Measures Documented    [] Yes LDA  for Pressure Injury Previously documented     [] Yes New Pressure Injury Discovered   [] LDA for New Pressure Injury Added      Attending RN:  Annika Soto RN     Second RN:  ESTHER Aguilar

## 2024-02-03 NOTE — HOSPITAL COURSE
Ms. Zee is a pleasant 23-year-old female type 1 diabetes, history of DKA, and medication compliance who was admitted for labial infection.  Possible HSV.  Patient was started on Valtrex and fluconazole with miconazole cream.  Patient's RPR was negative.  Blood cultures no growth, chlamydia and GC screen is negative.  Labial cultures and Urine cx with MRSA. ID consulted. She was able to take oral antibiotics. Patient counseled to be compliant with medications,  Practice safe sex measures with the use of condom to reduce the spread of STD.

## 2024-02-03 NOTE — ASSESSMENT & PLAN NOTE
Suspect herpes simplex outbreak. Check HSV 1&2 titers  Start Valtrex, continue antifungals  -blood cultures no growth.  -GC and chlamydia culture negative.

## 2024-02-03 NOTE — PLAN OF CARE
Problem: Diabetes Comorbidity  Goal: Blood Glucose Level Within Targeted Range  Intervention: Monitor and Manage Glycemia  Flowsheets (Taken 2/2/2024 1939)  Glycemic Management:   blood glucose monitored   insulin infusion adjusted   oral hydration promoted   supplemental insulin given

## 2024-02-03 NOTE — PROGRESS NOTES
[facility]  Progress Note    Patient Name: Rosemary Zee  MRN: [id number]  Patient Class: IP- Inpatient   Admission Date: 2/1/2024  Length of Stay: 2 days  Attending Physician: Xander Donaldson MD  Primary Care Provider: [facility], Primary Doctor        Subjective:     Principal Problem:Labial infection        HPI:  23-year-old  female with medical history significant for type 1 diabetes mellitus,medication non adherence with prior admission for DKA,who presented to the ED with whitish vaginal discharge,redness and swelling of her jake vaginal area, she first noticed the ' whitish bump' 5 days prior to presentation and was seen at an outside ED where acyclovir and fluconazole was prescribed but did not start taking them until a day prior to this presentation,she denied associated fever,chills or rigor,noticed dysuria which she attributed to the ulcer,no frequency,urgency,supra pubic pain or hematuria,she denied any abdominal pain,no nausea or vomiting,no change in bowel habit,denied diarrhea or constipation,she's sexually active and voiced using condom consistently,no previous history of STI.She voiced poorly controled diabetes, with random blood glucose mostly in the 300s,takes 6 units of Lantus twice daily.  Labs obtained in the ED showed  white blood cell count of 13.57,anion gap is 11. Lactic acid 1.2,beta hydroxy was elevated at 2.7. ESR 62 and .7, Urinalysis showed wbc 23 and 8 RBCs. RPR was non-reactive,CT abdominal pelvis showed no acute abnormality.          Overview/Hospital Course:  Ms. Zee is a pleasant 23-year-old female type 1 diabetes, history of DKA, and medication compliance who was admitted for labial infection.  Possible HSV.  Patient was started on Valtrex and fluconazole with miconazole cream.  Patient's RPR was negative.  Blood cultures no growth, chlamydia and GC screen is negative.  Patient counseled to be compliant with medications,  Practice  safe sex measures with the use of condom to reduce the spread of STD.    Interval History:  Patient says her labial lesions, pain, swelling, discomfort is gradually improving.  Patient being treated for possible HSV infection.  RPR screen negative.  Patient on Valtrex, fluconazole, and miconazole cream.  Patient blood sugars over 200, increase Levemir to 16 units in the evening, and 28 units in the a.m..  Laboratory showed WBC down 6, was 13.  Creatinine 0.7.  Potassium was low at 3.3, we will replete and trend.    Review of Systems   Constitutional: Negative.    HENT: Negative.     Eyes: Negative.    Respiratory: Negative.     Cardiovascular: Negative.    Gastrointestinal: Negative.    Endocrine: Negative.    Genitourinary:  Positive for genital sores and vaginal pain.   Musculoskeletal: Negative.    Skin: Negative.    Allergic/Immunologic: Negative.    Neurological: Negative.    Hematological: Negative.    Psychiatric/Behavioral: Negative.       Objective:     Vital Signs (Most Recent):  Temp: 97.9 °F (36.6 °C) (02/03/24 0447)  Pulse: 68 (02/03/24 0447)  Resp: 18 (02/03/24 0447)  BP: 106/63 (02/03/24 0447)  SpO2: 98 % (02/03/24 0447) Vital Signs (24h Range):  Temp:  [97.9 °F (36.6 °C)-98.7 °F (37.1 °C)] 97.9 °F (36.6 °C)  Pulse:  [68-85] 68  Resp:  [16-18] 18  SpO2:  [98 %-100 %] 98 %  BP: (102-119)/(63-70) 106/63     Weight: 59.3 kg (130 lb 12.8 oz)  Body mass index is 23.92 kg/m².    Intake/Output Summary (Last 24 hours) at 2/3/2024 0736  Last data filed at 2/3/2024 0400  Gross per 24 hour   Intake 2017.81 ml   Output --   Net 2017.81 ml         Physical Exam  Vitals reviewed.   Constitutional:       Appearance: Normal appearance. She is normal weight.   HENT:      Head: Normocephalic and atraumatic.      Right Ear: External ear normal.      Left Ear: External ear normal.      Nose: Nose normal.      Mouth/Throat:      Mouth: Mucous membranes are moist.      Pharynx: Oropharynx is clear.   Eyes:      Extraocular  "Movements: Extraocular movements intact.      Conjunctiva/sclera: Conjunctivae normal.      Pupils: Pupils are equal, round, and reactive to light.   Cardiovascular:      Rate and Rhythm: Normal rate and regular rhythm.      Pulses: Normal pulses.      Heart sounds: Normal heart sounds.   Pulmonary:      Effort: Pulmonary effort is normal.      Breath sounds: Normal breath sounds.   Abdominal:      General: Abdomen is flat. Bowel sounds are normal.      Palpations: Abdomen is soft.   Genitourinary:     Comments: Patient was scattered vesicular lesions in the vulvar area, redness, and tenderness on palpation that is gradually improving.  Musculoskeletal:         General: Normal range of motion.      Cervical back: Normal range of motion and neck supple.   Skin:     General: Skin is warm.      Capillary Refill: Capillary refill takes less than 2 seconds.   Neurological:      General: No focal deficit present.      Mental Status: She is alert and oriented to person, place, and time. Mental status is at baseline.      Cranial Nerves: No cranial nerve deficit.   Psychiatric:         Mood and Affect: Mood normal.         Behavior: Behavior normal.         Thought Content: Thought content normal.             Significant Labs: All pertinent labs within the past 24 hours have been reviewed.  Blood Culture:   Recent Labs   Lab 02/01/24 1954 02/01/24 1955   LABBLOO No Growth to date  No Growth to date No Growth to date  No Growth to date     BMP:   Recent Labs   Lab 02/03/24  0443   *   *   K 3.3*      CO2 19*   BUN 6   CREATININE 0.7   CALCIUM 8.4*     CBC:   Recent Labs   Lab 02/01/24 1955 02/02/24  0432 02/03/24  0443   WBC 13.57* 10.69 6.57   HGB 12.3 11.3* 10.6*   HCT 35.6* 33.9* 30.7*    298 296     Urine Culture: No results for input(s): "LABURIN" in the last 48 hours.    Significant Imaging: I have reviewed all pertinent imaging results/findings within the past 24 " hours.    Assessment/Plan:      * Labial infection  Suspect herpes simplex outbreak. Check HSV 1&2 titers  Start Valtrex, continue antifungals  -blood cultures no growth.  -GC and chlamydia culture negative.  -RPR screening test negative.  -we will discuss checking HIV screening test.    UTI (urinary tract infection)  Do not suspect true UTI  Urinalysis showed 1+ leukocyte esterase, microscopy with 23 WBC, 8 RBC  Symptoms may maybe due to ulcers around the genital area  Fu with ucx   Blood culture no growth.  -GC chlamydia screen negative.      Screening examination for STD (sexually transmitted disease)  F/u STD panel   -GC and chlamydia culture negative.      Non-compliance  She has not been compliant with her insulin regimen  Consulted diabetic educator while inpatient.  Needs OP DM educator program      Type 1 diabetes mellitus with hyperglycemia  Patient's FSGs are uncontrolled due to hyperglycemia on current medication regimen.  Last A1c reviewed-   Lab Results   Component Value Date    HGBA1C >14.0 (H) 02/02/2024     Most recent fingerstick glucose reviewed-   Recent Labs   Lab 02/02/24  1123 02/02/24  1633 02/02/24  1936   POCTGLUCOSE 253* 281* 256*       Current correctional scale: high  Increase anti-hyperglycemic dose as follows-   Antihyperglycemics (From admission, onward)      Start     Stop Route Frequency Ordered    02/03/24 2100  insulin detemir U-100 (Levemir) pen 16 Units         -- SubQ Nightly 02/03/24 0734    02/03/24 0900  insulin detemir U-100 (Levemir) pen 28 Units         -- SubQ Daily 02/03/24 0734    02/02/24 0841  insulin aspart U-100 pen 1-15 Units         -- SubQ Before meals & nightly PRN 02/02/24 0842          Hold Oral hypoglycemics while patient is in the hospital.      VTE Risk Mitigation (From admission, onward)           Ordered     heparin (porcine) injection 5,000 Units  Every 8 hours         02/02/24 0244     IP VTE LOW RISK PATIENT  Once         02/02/24 0244     Place  sequential compression device  Until discontinued         02/02/24 0244                    Discharge Planning   CONSUELO:      Code Status: Full Code   Is the patient medically ready for discharge?:     Reason for patient still in hospital (select all that apply): Patient trending condition and Treatment  Discharge Plan A: Home, Home with family                  Xander Donaldson MD  Department of Hospital Medicine   [facility]

## 2024-02-03 NOTE — ASSESSMENT & PLAN NOTE
Do not suspect true UTI  Urinalysis showed 1+ leukocyte esterase, microscopy with 23 WBC, 8 RBC  Symptoms may maybe due to ulcers around the genital area  Fu with ucx   Blood culture no growth.  -GC chlamydia screen negative.

## 2024-02-03 NOTE — SUBJECTIVE & OBJECTIVE
Interval History:  Patient says her labial lesions, pain, swelling, discomfort is gradually improving.  Patient being treated for possible HSV infection.  RPR screen negative.  Patient on Valtrex, fluconazole, and miconazole cream.  Patient blood sugars over 200, increase Levemir to 16 units in the evening, and 28 units in the a.m..  Laboratory showed WBC down 6, was 13.  Creatinine 0.7.  Potassium was low at 3.3, we will replete and trend.    Review of Systems   Constitutional: Negative.    HENT: Negative.     Eyes: Negative.    Respiratory: Negative.     Cardiovascular: Negative.    Gastrointestinal: Negative.    Endocrine: Negative.    Genitourinary:  Positive for genital sores and vaginal pain.   Musculoskeletal: Negative.    Skin: Negative.    Allergic/Immunologic: Negative.    Neurological: Negative.    Hematological: Negative.    Psychiatric/Behavioral: Negative.       Objective:     Vital Signs (Most Recent):  Temp: 97.9 °F (36.6 °C) (02/03/24 0447)  Pulse: 68 (02/03/24 0447)  Resp: 18 (02/03/24 0447)  BP: 106/63 (02/03/24 0447)  SpO2: 98 % (02/03/24 0447) Vital Signs (24h Range):  Temp:  [97.9 °F (36.6 °C)-98.7 °F (37.1 °C)] 97.9 °F (36.6 °C)  Pulse:  [68-85] 68  Resp:  [16-18] 18  SpO2:  [98 %-100 %] 98 %  BP: (102-119)/(63-70) 106/63     Weight: 59.3 kg (130 lb 12.8 oz)  Body mass index is 23.92 kg/m².    Intake/Output Summary (Last 24 hours) at 2/3/2024 0736  Last data filed at 2/3/2024 0400  Gross per 24 hour   Intake 2017.81 ml   Output --   Net 2017.81 ml         Physical Exam  Vitals reviewed.   Constitutional:       Appearance: Normal appearance. She is normal weight.   HENT:      Head: Normocephalic and atraumatic.      Right Ear: External ear normal.      Left Ear: External ear normal.      Nose: Nose normal.      Mouth/Throat:      Mouth: Mucous membranes are moist.      Pharynx: Oropharynx is clear.   Eyes:      Extraocular Movements: Extraocular movements intact.      Conjunctiva/sclera:  "Conjunctivae normal.      Pupils: Pupils are equal, round, and reactive to light.   Cardiovascular:      Rate and Rhythm: Normal rate and regular rhythm.      Pulses: Normal pulses.      Heart sounds: Normal heart sounds.   Pulmonary:      Effort: Pulmonary effort is normal.      Breath sounds: Normal breath sounds.   Abdominal:      General: Abdomen is flat. Bowel sounds are normal.      Palpations: Abdomen is soft.   Genitourinary:     Comments: Patient was scattered vesicular lesions in the vulvar area, redness, and tenderness on palpation that is gradually improving.  Musculoskeletal:         General: Normal range of motion.      Cervical back: Normal range of motion and neck supple.   Skin:     General: Skin is warm.      Capillary Refill: Capillary refill takes less than 2 seconds.   Neurological:      General: No focal deficit present.      Mental Status: She is alert and oriented to person, place, and time. Mental status is at baseline.      Cranial Nerves: No cranial nerve deficit.   Psychiatric:         Mood and Affect: Mood normal.         Behavior: Behavior normal.         Thought Content: Thought content normal.             Significant Labs: All pertinent labs within the past 24 hours have been reviewed.  Blood Culture:   Recent Labs   Lab 02/01/24 1954 02/01/24 1955   LABBLOO No Growth to date  No Growth to date No Growth to date  No Growth to date     BMP:   Recent Labs   Lab 02/03/24  0443   *   *   K 3.3*      CO2 19*   BUN 6   CREATININE 0.7   CALCIUM 8.4*     CBC:   Recent Labs   Lab 02/01/24 1955 02/02/24  0432 02/03/24  0443   WBC 13.57* 10.69 6.57   HGB 12.3 11.3* 10.6*   HCT 35.6* 33.9* 30.7*    298 296     Urine Culture: No results for input(s): "LABURIN" in the last 48 hours.    Significant Imaging: I have reviewed all pertinent imaging results/findings within the past 24 hours.  "

## 2024-02-03 NOTE — ASSESSMENT & PLAN NOTE
Patient's FSGs are uncontrolled due to hyperglycemia on current medication regimen.  Last A1c reviewed-   Lab Results   Component Value Date    HGBA1C >14.0 (H) 02/02/2024     Most recent fingerstick glucose reviewed-   Recent Labs   Lab 02/02/24  1123 02/02/24  1633 02/02/24  1936   POCTGLUCOSE 253* 281* 256*       Current correctional scale: high  Increase anti-hyperglycemic dose as follows-   Antihyperglycemics (From admission, onward)    Start     Stop Route Frequency Ordered    02/03/24 2100  insulin detemir U-100 (Levemir) pen 16 Units         -- SubQ Nightly 02/03/24 0734    02/03/24 0900  insulin detemir U-100 (Levemir) pen 28 Units         -- SubQ Daily 02/03/24 0734    02/02/24 0841  insulin aspart U-100 pen 1-15 Units         -- SubQ Before meals & nightly PRN 02/02/24 0842        Hold Oral hypoglycemics while patient is in the hospital.

## 2024-02-04 LAB
ALBUMIN SERPL BCP-MCNC: 2.5 G/DL (ref 3.5–5.2)
ALP SERPL-CCNC: 91 U/L (ref 55–135)
ALT SERPL W/O P-5'-P-CCNC: 29 U/L (ref 10–44)
ANION GAP SERPL CALC-SCNC: 7 MMOL/L (ref 8–16)
AST SERPL-CCNC: 28 U/L (ref 10–40)
BACTERIA SPEC AEROBE CULT: ABNORMAL
BACTERIA UR CULT: ABNORMAL
BACTERIA UR CULT: NO GROWTH
BASOPHILS # BLD AUTO: 0.04 K/UL (ref 0–0.2)
BASOPHILS NFR BLD: 0.7 % (ref 0–1.9)
BILIRUB SERPL-MCNC: 0.2 MG/DL (ref 0.1–1)
BUN SERPL-MCNC: 5 MG/DL (ref 6–20)
CALCIUM SERPL-MCNC: 8.3 MG/DL (ref 8.7–10.5)
CHLORIDE SERPL-SCNC: 110 MMOL/L (ref 95–110)
CO2 SERPL-SCNC: 24 MMOL/L (ref 23–29)
CREAT SERPL-MCNC: 0.7 MG/DL (ref 0.5–1.4)
DIFFERENTIAL METHOD BLD: ABNORMAL
EOSINOPHIL # BLD AUTO: 0.1 K/UL (ref 0–0.5)
EOSINOPHIL NFR BLD: 0.9 % (ref 0–8)
ERYTHROCYTE [DISTWIDTH] IN BLOOD BY AUTOMATED COUNT: 11.8 % (ref 11.5–14.5)
EST. GFR  (NO RACE VARIABLE): >60 ML/MIN/1.73 M^2
GLUCOSE SERPL-MCNC: 112 MG/DL (ref 70–110)
HCT VFR BLD AUTO: 30.3 % (ref 37–48.5)
HGB BLD-MCNC: 10.3 G/DL (ref 12–16)
HIV1+2 IGG SERPL QL IA.RAPID: NORMAL
IMM GRANULOCYTES # BLD AUTO: 0.06 K/UL (ref 0–0.04)
IMM GRANULOCYTES NFR BLD AUTO: 1.1 % (ref 0–0.5)
LYMPHOCYTES # BLD AUTO: 3.3 K/UL (ref 1–4.8)
LYMPHOCYTES NFR BLD: 58.3 % (ref 18–48)
MCH RBC QN AUTO: 27.6 PG (ref 27–31)
MCHC RBC AUTO-ENTMCNC: 34 G/DL (ref 32–36)
MCV RBC AUTO: 81 FL (ref 82–98)
MONOCYTES # BLD AUTO: 0.5 K/UL (ref 0.3–1)
MONOCYTES NFR BLD: 8.7 % (ref 4–15)
NEUTROPHILS # BLD AUTO: 1.7 K/UL (ref 1.8–7.7)
NEUTROPHILS NFR BLD: 30.3 % (ref 38–73)
NRBC BLD-RTO: 0 /100 WBC
PLATELET # BLD AUTO: 351 K/UL (ref 150–450)
PMV BLD AUTO: 10 FL (ref 9.2–12.9)
POCT GLUCOSE: 130 MG/DL (ref 70–110)
POCT GLUCOSE: 149 MG/DL (ref 70–110)
POCT GLUCOSE: 178 MG/DL (ref 70–110)
POCT GLUCOSE: 202 MG/DL (ref 70–110)
POTASSIUM SERPL-SCNC: 3.2 MMOL/L (ref 3.5–5.1)
PROT SERPL-MCNC: 5.8 G/DL (ref 6–8.4)
RBC # BLD AUTO: 3.73 M/UL (ref 4–5.4)
SODIUM SERPL-SCNC: 141 MMOL/L (ref 136–145)
WBC # BLD AUTO: 5.63 K/UL (ref 3.9–12.7)

## 2024-02-04 PROCEDURE — 80053 COMPREHEN METABOLIC PANEL: CPT | Performed by: STUDENT IN AN ORGANIZED HEALTH CARE EDUCATION/TRAINING PROGRAM

## 2024-02-04 PROCEDURE — 99232 SBSQ HOSP IP/OBS MODERATE 35: CPT | Mod: 95,,, | Performed by: OBSTETRICS & GYNECOLOGY

## 2024-02-04 PROCEDURE — 11000001 HC ACUTE MED/SURG PRIVATE ROOM

## 2024-02-04 PROCEDURE — 25000003 PHARM REV CODE 250: Performed by: OBSTETRICS & GYNECOLOGY

## 2024-02-04 PROCEDURE — 86703 HIV-1/HIV-2 1 RESULT ANTBDY: CPT | Performed by: INTERNAL MEDICINE

## 2024-02-04 PROCEDURE — 25000003 PHARM REV CODE 250: Performed by: INTERNAL MEDICINE

## 2024-02-04 PROCEDURE — 63600175 PHARM REV CODE 636 W HCPCS: Performed by: INTERNAL MEDICINE

## 2024-02-04 PROCEDURE — 85025 COMPLETE CBC W/AUTO DIFF WBC: CPT | Performed by: STUDENT IN AN ORGANIZED HEALTH CARE EDUCATION/TRAINING PROGRAM

## 2024-02-04 PROCEDURE — 27000207 HC ISOLATION

## 2024-02-04 PROCEDURE — 63600175 PHARM REV CODE 636 W HCPCS: Performed by: STUDENT IN AN ORGANIZED HEALTH CARE EDUCATION/TRAINING PROGRAM

## 2024-02-04 PROCEDURE — 36415 COLL VENOUS BLD VENIPUNCTURE: CPT | Mod: XB | Performed by: STUDENT IN AN ORGANIZED HEALTH CARE EDUCATION/TRAINING PROGRAM

## 2024-02-04 PROCEDURE — 36415 COLL VENOUS BLD VENIPUNCTURE: CPT | Performed by: INTERNAL MEDICINE

## 2024-02-04 PROCEDURE — 25000003 PHARM REV CODE 250: Performed by: FAMILY MEDICINE

## 2024-02-04 PROCEDURE — 25000003 PHARM REV CODE 250: Performed by: STUDENT IN AN ORGANIZED HEALTH CARE EDUCATION/TRAINING PROGRAM

## 2024-02-04 RX ORDER — CLOTRIMAZOLE AND BETAMETHASONE DIPROPIONATE 10; .64 MG/G; MG/G
CREAM TOPICAL 2 TIMES DAILY
Status: DISCONTINUED | OUTPATIENT
Start: 2024-02-04 | End: 2024-02-05 | Stop reason: HOSPADM

## 2024-02-04 RX ORDER — LIDOCAINE 40 MG/G
CREAM TOPICAL
Status: DISCONTINUED | OUTPATIENT
Start: 2024-02-04 | End: 2024-02-05 | Stop reason: HOSPADM

## 2024-02-04 RX ORDER — POTASSIUM CHLORIDE 20 MEQ/1
40 TABLET, EXTENDED RELEASE ORAL ONCE
Status: COMPLETED | OUTPATIENT
Start: 2024-02-04 | End: 2024-02-04

## 2024-02-04 RX ADMIN — HEPARIN SODIUM 5000 UNITS: 5000 INJECTION INTRAVENOUS; SUBCUTANEOUS at 06:02

## 2024-02-04 RX ADMIN — INSULIN ASPART 6 UNITS: 100 INJECTION, SOLUTION INTRAVENOUS; SUBCUTANEOUS at 11:02

## 2024-02-04 RX ADMIN — CLOTRIMAZOLE AND BETAMETHASONE DIPROPIONATE: 10; .64 CREAM TOPICAL at 08:02

## 2024-02-04 RX ADMIN — VALACYCLOVIR HYDROCHLORIDE 1000 MG: 500 TABLET, FILM COATED ORAL at 08:02

## 2024-02-04 RX ADMIN — CLOTRIMAZOLE AND BETAMETHASONE DIPROPIONATE: 10; .64 CREAM TOPICAL at 11:02

## 2024-02-04 RX ADMIN — FLUCONAZOLE 200 MG: 2 INJECTION, SOLUTION INTRAVENOUS at 03:02

## 2024-02-04 RX ADMIN — HEPARIN SODIUM 5000 UNITS: 5000 INJECTION INTRAVENOUS; SUBCUTANEOUS at 01:02

## 2024-02-04 RX ADMIN — PIPERACILLIN AND TAZOBACTAM 4.5 G: 4; .5 INJECTION, POWDER, LYOPHILIZED, FOR SOLUTION INTRAVENOUS; PARENTERAL at 12:02

## 2024-02-04 RX ADMIN — VANCOMYCIN HYDROCHLORIDE 1250 MG: 1.25 INJECTION, POWDER, LYOPHILIZED, FOR SOLUTION INTRAVENOUS at 10:02

## 2024-02-04 RX ADMIN — PIPERACILLIN AND TAZOBACTAM 4.5 G: 4; .5 INJECTION, POWDER, LYOPHILIZED, FOR SOLUTION INTRAVENOUS; PARENTERAL at 06:02

## 2024-02-04 RX ADMIN — HYDROCODONE BITARTRATE AND ACETAMINOPHEN 1 TABLET: 5; 325 TABLET ORAL at 04:02

## 2024-02-04 RX ADMIN — INSULIN DETEMIR 28 UNITS: 100 INJECTION, SOLUTION SUBCUTANEOUS at 08:02

## 2024-02-04 RX ADMIN — VANCOMYCIN HYDROCHLORIDE 1000 MG: 1 INJECTION, POWDER, LYOPHILIZED, FOR SOLUTION INTRAVENOUS at 10:02

## 2024-02-04 RX ADMIN — SIMETHICONE 80 MG: 80 TABLET, CHEWABLE ORAL at 09:02

## 2024-02-04 RX ADMIN — HEPARIN SODIUM 5000 UNITS: 5000 INJECTION INTRAVENOUS; SUBCUTANEOUS at 09:02

## 2024-02-04 RX ADMIN — POTASSIUM CHLORIDE 40 MEQ: 1500 TABLET, EXTENDED RELEASE ORAL at 10:02

## 2024-02-04 RX ADMIN — MICONAZOLE NITRATE: 20 POWDER TOPICAL at 08:02

## 2024-02-04 RX ADMIN — PIPERACILLIN AND TAZOBACTAM 4.5 G: 4; .5 INJECTION, POWDER, LYOPHILIZED, FOR SOLUTION INTRAVENOUS; PARENTERAL at 01:02

## 2024-02-04 RX ADMIN — POLYETHYLENE GLYCOL 3350 17 G: 17 POWDER, FOR SOLUTION ORAL at 08:02

## 2024-02-04 RX ADMIN — INSULIN ASPART 3 UNITS: 100 INJECTION, SOLUTION INTRAVENOUS; SUBCUTANEOUS at 04:02

## 2024-02-04 NOTE — CONSULTS
[facility]  Obstetrics & Gynecology  Consult Note    Patient Name: Rosemary Zee  MRN: [id number]  Admission Date: 2024  Hospital Length of Stay: 3 days  Code Status: Full Code  Primary Care Provider: No, Primary Doctor  Principal Problem: Labial infection    Inpatient consult to Gynecology  Consult performed by: Jordyn Desouza MD  Consult ordered by: Xander Donaldson MD        Subjective:     Chief Complaint: vulvovaginitis    History of Present Illness:   Patient presented to the ED with whitish vaginal discharge,redness and swelling of her vulvar area, she first noticed the ' whitish bump' 5 days prior to presentation and was seen at an outside ED where acyclovir and fluconazole was prescribed but did not start taking them.  She denies associated fever or chills.  She does have dysuria.  Today reports that is it burning in her urethra when she urinates.  She is sexually active and uses condoms consistently.  No history of previous STI.    No current facility-administered medications on file prior to encounter.     Current Outpatient Medications on File Prior to Encounter   Medication Sig    acyclovir (ZOVIRAX) 400 MG tablet Take 1 tablet (400 mg total) by mouth 3 (three) times daily. for 10 days    fluconazole (DIFLUCAN) 200 MG Tab Take 1 tablet (200 mg total) by mouth once daily. for 7 days    insulin detemir U-100 (LEVEMIR) 100 unit/mL injection Inject 8 Units into the skin every evening.       Review of patient's allergies indicates:  No Known Allergies    Past Medical History:   Diagnosis Date    Diabetes mellitus      OB History    Para Term  AB Living   0 0 0 0 0 0   SAB IAB Ectopic Multiple Live Births   0 0 0 0 0   Obstetric Comments   Gynhx: reg/7   Sexually active @ age 20, one LTP     History reviewed. No pertinent surgical history.  Family History       Problem Relation (Age of Onset)    Diabetes Mother, Maternal Grandfather, Paternal Grandmother    Heart attacks under  age 50 Mother    Stroke Mother          Tobacco Use    Smoking status: Passive Smoke Exposure - Never Smoker    Smokeless tobacco: Never   Substance and Sexual Activity    Alcohol use: No    Drug use: No    Sexual activity: Yes     Partners: Male     Review of Systems   Constitutional: Negative.    HENT: Negative.     Eyes: Negative.    Respiratory: Negative.     Cardiovascular: Negative.    Gastrointestinal: Negative.    Endocrine: Negative.    Genitourinary:  Positive for dysuria, vaginal discharge and vaginal pain.   Musculoskeletal: Negative.    Integumentary:  Negative.   Neurological: Negative.    Hematological: Negative.    Psychiatric/Behavioral: Negative.     Breast: negative.      Objective:     Vital Signs (Most Recent):  Temp: 98.6 °F (37 °C) (02/04/24 0731)  Pulse: 78 (02/04/24 0731)  Resp: 18 (02/04/24 0731)  BP: 115/84 (02/04/24 0731)  SpO2: 98 % (02/04/24 0731) Vital Signs (24h Range):  Temp:  [98.1 °F (36.7 °C)-99 °F (37.2 °C)] 98.6 °F (37 °C)  Pulse:  [66-83] 78  Resp:  [18] 18  SpO2:  [98 %-100 %] 98 %  BP: (103-126)/(61-84) 115/84     Weight: 59.3 kg (130 lb 12.8 oz)  Body mass index is 23.92 kg/m².  Patient's last menstrual period was 12/19/2023 (approximate).    Physical Exam:   Constitutional: She is oriented to person, place, and time. She appears well-developed.    HENT:   Head: Normocephalic and atraumatic.    Eyes: EOM are normal.     Cardiovascular:  Normal rate.             Pulmonary/Chest: Effort normal.        Abdominal: Soft. There is no abdominal tenderness.     Genitourinary: The external female genitalia was normal.   No external genitalia lesions identified,  Labial bartholins normal.There is rash (erythema and swelling of bilaterl labia majora noted.  No specific lesions seen.  however, exam was limited due to patient discomfort.  Discharge noted on external genitalia) and tenderness on the right labia. There is no lesion on the right labia. There is rash and tenderness on the  left labia. There is no lesion on the left labia. There is vaginal discharge (copious, almost weeping discharge.) in the vagina. Vagina was moist.          Musculoskeletal: Normal range of motion.       Neurological: She is oriented to person, place, and time.    Skin: Skin is warm.    Psychiatric: She has a normal mood and affect.       Laboratory:  Component      Latest Ref Rng 1/29/2024 1/30/2024   Specimen UA Urine, Clean Catch     Color, UA      Yellow, Straw, Elaine  Yellow     Appearance, UA      Clear  Clear     pH, UA      5.0 - 8.0  6.0     Specific Gravity, UA      1.005 - 1.030  >1.030 !     Protein, UA      Negative  Trace !     Glucose, UA      Negative  4+ !     Ketones, UA      Negative  3+ !     Bilirubin (UA)      Negative  Negative     Blood, UA      Negative  Negative     NITRITE UA      Negative  Negative     UROBILINOGEN UA      <2.0 EU/dL Negative     Leukocyte Esterase, UA      Negative  Negative     RPR      Non-reactive   Non-reactive       Component      Latest Ref Rng 1/29/2024 1/30/2024 2/1/2024   Trichomonas      None  None  None     Clue Cells, Wet Prep      None  None  None     Budding Yeast      None  None  Rare !     Fungal Hyphae      None  None  None     WBC - Vaginal Screen      None  Moderate !  Few !     Bacteria - Vaginal Screen      None  Many !  Few !     Wet Prep Source Vagina  VAG     Blood Culture, Routine   No Growth to date (P)   Blood Culture, Routine   No Growth to date (P)   Blood Culture, Routine   No Growth to date (P)   Blood Culture, Routine   No Growth to date (P)   Blood Culture, Routine   No Growth to date (P)   Blood Culture, Routine   No Growth to date (P)   Chlamydia, Amplified DNA      Not Detected   Not Detected     N gonorrhoeae, amplified DNA      Not Detected   Not Detected     Urine Culture   METHICILLIN RESISTANT STAPHYLOCOCCUS AUREUS !    Aerobic Bacterial Culture        Component      Latest Ref Rng 2/2/2024   Trichomonas      None     Clue Cells,  Wet Prep      None     Budding Yeast      None     Fungal Hyphae      None     WBC - Vaginal Screen      None     Bacteria - Vaginal Screen      None     Wet Prep Source    Blood Culture, Routine    Chlamydia, Amplified DNA      Not Detected     N gonorrhoeae, amplified DNA      Not Detected     Urine Culture No growth    Aerobic Bacterial Culture METHICILLIN RESISTANT STAPHYLOCOCCUS AUREUS !         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    - Continue Valtrex for possible genital HSV.  - Lotrisone cream to help with irritation, swelling, and possible yeast infection.  - continue diflucan.  - Consider Bactrim for possible MRSA UTI.  Patient still reports burning with urination.     Thank you for your consult. I will sign off. Please contact us if you have any additional questions.    Jordyn Desouza MD  Obstetrics & Gynecology  [facility] [place] - Med Surg

## 2024-02-04 NOTE — SUBJECTIVE & OBJECTIVE
Interval History:  Patient is seen for follow-up care.  Patient being treated for vulvar/vaginitis infection, possible HSV.  Discussed checking HIV screening test.  Patient RPR, GC and chlamydia screens negative.  Patient is started on IV Zosyn and p.o. Valtrex.  Gynecology consulted, input appreciated.  We will also add lidocaine cream 4% to be applied t.i.d. p.r.n. the patient's vaginal wound culture growing Staphylococcus aureus, idea sensitivity pending.  Urine and blood cultures no growth.  Potassium level 3.2, will replete and trend.  Creatinine 0.7, WBC 5.6, hemoglobin stable at 10.3.    Review of Systems   Constitutional: Negative.    HENT: Negative.     Eyes: Negative.    Respiratory: Negative.     Cardiovascular: Negative.    Gastrointestinal: Negative.    Endocrine: Negative.    Genitourinary:  Positive for vaginal discharge and vaginal pain.   Musculoskeletal: Negative.    Skin: Negative.    Allergic/Immunologic: Negative.    Neurological: Negative.    Hematological: Negative.    Psychiatric/Behavioral: Negative.       Objective:     Vital Signs (Most Recent):  Temp: 98.6 °F (37 °C) (02/04/24 0731)  Pulse: 78 (02/04/24 0731)  Resp: 18 (02/04/24 0731)  BP: 115/84 (02/04/24 0731)  SpO2: 98 % (02/04/24 0731) Vital Signs (24h Range):  Temp:  [98.1 °F (36.7 °C)-99 °F (37.2 °C)] 98.6 °F (37 °C)  Pulse:  [66-83] 78  Resp:  [18] 18  SpO2:  [98 %-100 %] 98 %  BP: (103-126)/(61-84) 115/84     Weight: 59.3 kg (130 lb 12.8 oz)  Body mass index is 23.92 kg/m².    Intake/Output Summary (Last 24 hours) at 2/4/2024 0853  Last data filed at 2/3/2024 2031  Gross per 24 hour   Intake 1320.12 ml   Output 1 ml   Net 1319.12 ml         Physical Exam  Vitals reviewed.   Constitutional:       Appearance: Normal appearance. She is normal weight.   HENT:      Head: Normocephalic and atraumatic.      Right Ear: External ear normal.      Left Ear: External ear normal.      Nose: Nose normal.      Mouth/Throat:      Mouth: Mucous  "membranes are moist.      Pharynx: Oropharynx is clear.   Eyes:      Extraocular Movements: Extraocular movements intact.      Conjunctiva/sclera: Conjunctivae normal.      Pupils: Pupils are equal, round, and reactive to light.   Cardiovascular:      Rate and Rhythm: Normal rate and regular rhythm.      Pulses: Normal pulses.      Heart sounds: Normal heart sounds.   Pulmonary:      Effort: Pulmonary effort is normal.      Breath sounds: Normal breath sounds.   Abdominal:      General: Abdomen is flat. Bowel sounds are normal.      Palpations: Abdomen is soft.   Genitourinary:     Comments: Vulvar/labial majora and minora redness, warmth, and superficial ulcerations.  Examined with female nurse present.  Musculoskeletal:         General: Normal range of motion.      Cervical back: Normal range of motion.   Skin:     General: Skin is warm.      Capillary Refill: Capillary refill takes less than 2 seconds.   Neurological:      General: No focal deficit present.      Mental Status: She is alert and oriented to person, place, and time. Mental status is at baseline.      Cranial Nerves: No cranial nerve deficit.   Psychiatric:         Mood and Affect: Mood normal.         Behavior: Behavior normal.         Thought Content: Thought content normal.         Judgment: Judgment normal.             Significant Labs: All pertinent labs within the past 24 hours have been reviewed.  Blood Culture: No results for input(s): "LABBLOO" in the last 48 hours.  BMP:   Recent Labs   Lab 02/04/24  0607   *      K 3.2*      CO2 24   BUN 5*   CREATININE 0.7   CALCIUM 8.3*     CBC:   Recent Labs   Lab 02/03/24  0443 02/04/24  0607   WBC 6.57 5.63   HGB 10.6* 10.3*   HCT 30.7* 30.3*    351     Urine Culture:   Recent Labs   Lab 02/02/24  2323   LABURIN No growth to date       Significant Imaging: I have reviewed all pertinent imaging results/findings within the past 24 hours.  "

## 2024-02-04 NOTE — ASSESSMENT & PLAN NOTE
- Continue Valtrex for possible genital HSV.  - Lotrisone cream to help with irritation, swelling, and possible yeast infection.  - continue diflucan.

## 2024-02-04 NOTE — ASSESSMENT & PLAN NOTE
F/u STD panel   -GC and chlamydia culture negative.  -RPR screening test negative.  -check HIV screening test.

## 2024-02-04 NOTE — NURSING
[facility], [facility] [place]  Nurses Note -- 4 Eyes      2/4/2024       Skin assessed on: Q Shift      [x] No Pressure Injuries Present    []Prevention Measures Documented    [] Yes LDA  for Pressure Injury Previously documented     [] Yes New Pressure Injury Discovered   [] LDA for New Pressure Injury Added      Attending RN:  Mounika Montejo, RN     Second RN:  Tiffani Haddad LPN

## 2024-02-04 NOTE — PROGRESS NOTES
[facility] Medicine  Progress Note    Patient Name: Rosemary Zee  MRN: [id number]  Patient Class: IP- Inpatient   Admission Date: 2/1/2024  Length of Stay: 3 days  Attending Physician: Xander Donaldson MD  Primary Care Provider: Serenity, Primary Doctor        Subjective:     Principal Problem:Labial infection        HPI:  23-year-old  female with medical history significant for type 1 diabetes mellitus,medication non adherence with prior admission for DKA,who presented to the ED with whitish vaginal discharge,redness and swelling of her jake vaginal area, she first noticed the ' whitish bump' 5 days prior to presentation and was seen at an outside ED where acyclovir and fluconazole was prescribed but did not start taking them until a day prior to this presentation,she denied associated fever,chills or rigor,noticed dysuria which she attributed to the ulcer,no frequency,urgency,supra pubic pain or hematuria,she denied any abdominal pain,no nausea or vomiting,no change in bowel habit,denied diarrhea or constipation,she's sexually active and voiced using condom consistently,no previous history of STI.She voiced poorly controled diabetes, with random blood glucose mostly in the 300s,takes 6 units of Lantus twice daily.  Labs obtained in the ED showed  white blood cell count of 13.57,anion gap is 11. Lactic acid 1.2,beta hydroxy was elevated at 2.7. ESR 62 and .7, Urinalysis showed wbc 23 and 8 RBCs. RPR was non-reactive,CT abdominal pelvis showed no acute abnormality.          Overview/Hospital Course:  Ms. Zee is a pleasant 23-year-old female type 1 diabetes, history of DKA, and medication compliance who was admitted for labial infection.  Possible HSV.  Patient was started on Valtrex and fluconazole with miconazole cream.  Patient's RPR was negative.  Blood cultures no growth, chlamydia and GC screen is negative.  Patient counseled to be compliant with medications,  Practice  safe sex measures with the use of condom to reduce the spread of STD.    Interval History:  Patient is seen for follow-up care.  Patient being treated for vulvar/vaginitis infection, possible HSV.  Discussed checking HIV screening test.  Patient RPR, GC and chlamydia screens negative.  Patient is started on IV Zosyn and p.o. Valtrex.  Gynecology consulted, input appreciated.  We will also add lidocaine cream 4% to be applied t.i.d. p.r.n. the patient's vaginal wound culture growing MRSA Staphylococcus aureus, urine culture growing MRSA as well.  We will consult Infectious Disease.  Add vancomycin.  Pharmacy to dose the vancomycin.  Blood cultures no growth.  Potassium level 3.2, will replete and trend.  Creatinine 0.7, WBC 5.6, hemoglobin stable at 10.3.    Review of Systems   Constitutional: Negative.    HENT: Negative.     Eyes: Negative.    Respiratory: Negative.     Cardiovascular: Negative.    Gastrointestinal: Negative.    Endocrine: Negative.    Genitourinary:  Positive for vaginal discharge and vaginal pain.   Musculoskeletal: Negative.    Skin: Negative.    Allergic/Immunologic: Negative.    Neurological: Negative.    Hematological: Negative.    Psychiatric/Behavioral: Negative.       Objective:     Vital Signs (Most Recent):  Temp: 98.6 °F (37 °C) (02/04/24 0731)  Pulse: 78 (02/04/24 0731)  Resp: 18 (02/04/24 0731)  BP: 115/84 (02/04/24 0731)  SpO2: 98 % (02/04/24 0731) Vital Signs (24h Range):  Temp:  [98.1 °F (36.7 °C)-99 °F (37.2 °C)] 98.6 °F (37 °C)  Pulse:  [66-83] 78  Resp:  [18] 18  SpO2:  [98 %-100 %] 98 %  BP: (103-126)/(61-84) 115/84     Weight: 59.3 kg (130 lb 12.8 oz)  Body mass index is 23.92 kg/m².    Intake/Output Summary (Last 24 hours) at 2/4/2024 0853  Last data filed at 2/3/2024 2031  Gross per 24 hour   Intake 1320.12 ml   Output 1 ml   Net 1319.12 ml         Physical Exam  Vitals reviewed.   Constitutional:       Appearance: Normal appearance. She is normal weight.   HENT:      Head:  "Normocephalic and atraumatic.      Right Ear: External ear normal.      Left Ear: External ear normal.      Nose: Nose normal.      Mouth/Throat:      Mouth: Mucous membranes are moist.      Pharynx: Oropharynx is clear.   Eyes:      Extraocular Movements: Extraocular movements intact.      Conjunctiva/sclera: Conjunctivae normal.      Pupils: Pupils are equal, round, and reactive to light.   Cardiovascular:      Rate and Rhythm: Normal rate and regular rhythm.      Pulses: Normal pulses.      Heart sounds: Normal heart sounds.   Pulmonary:      Effort: Pulmonary effort is normal.      Breath sounds: Normal breath sounds.   Abdominal:      General: Abdomen is flat. Bowel sounds are normal.      Palpations: Abdomen is soft.   Genitourinary:     Comments: Vulvar/labial majora and minora redness, warmth, and superficial ulcerations.  Examined with female nurse present.  Musculoskeletal:         General: Normal range of motion.      Cervical back: Normal range of motion.   Skin:     General: Skin is warm.      Capillary Refill: Capillary refill takes less than 2 seconds.   Neurological:      General: No focal deficit present.      Mental Status: She is alert and oriented to person, place, and time. Mental status is at baseline.      Cranial Nerves: No cranial nerve deficit.   Psychiatric:         Mood and Affect: Mood normal.         Behavior: Behavior normal.         Thought Content: Thought content normal.         Judgment: Judgment normal.             Significant Labs: All pertinent labs within the past 24 hours have been reviewed.  Blood Culture: No results for input(s): "LABBLOO" in the last 48 hours.  BMP:   Recent Labs   Lab 02/04/24  0607   *      K 3.2*      CO2 24   BUN 5*   CREATININE 0.7   CALCIUM 8.3*     CBC:   Recent Labs   Lab 02/03/24  0443 02/04/24  0607   WBC 6.57 5.63   HGB 10.6* 10.3*   HCT 30.7* 30.3*    351     Urine Culture:   Recent Labs   Lab 02/02/24  2323   LABURIN " No growth to date       Significant Imaging: I have reviewed all pertinent imaging results/findings within the past 24 hours.    Assessment/Plan:      * Labial infection  Suspect herpes simplex outbreak. Check HSV 1&2 titers  -gynecology consultation  Start Valtrex, continue antifungals  -blood cultures no growth.  -vaginal wound culture growing MRSA.  -consult Infectious Disease  -add vancomycin, pharmacy to dose  -urine  culture growing Staphylococcus aureus, with ID and sensitivity pending.  -GC and chlamydia culture negative.  -check HIV screening test    UTI (urinary tract infection)  Do not suspect true UTI  Urinalysis showed 1+ leukocyte esterase, microscopy with 23 WBC, 8 RBC  Symptoms may maybe due to ulcers around the genital area  Fu with ucx , urine culture no growth.  Blood culture no growth.  -GC and chlamydia screen negative.  -RPR negative      Screening examination for STD (sexually transmitted disease)  F/u STD panel   -GC and chlamydia culture negative.  -RPR screening test negative.  -check HIV screening test.      Non-compliance  She has not been compliant with her insulin regimen  Consulted diabetic educator while inpatient.  Needs OP DM educator program      Type 1 diabetes mellitus with hyperglycemia  Patient's FSGs are uncontrolled due to hyperglycemia on current medication regimen.  Last A1c reviewed-   Lab Results   Component Value Date    HGBA1C >14.0 (H) 02/02/2024     Most recent fingerstick glucose reviewed-   Recent Labs   Lab 02/03/24  1143 02/03/24  1615 02/03/24  1947 02/04/24  0729   POCTGLUCOSE 191* 321* 309* 130*       Current correctional scale: high  Increase anti-hyperglycemic dose as follows-   Antihyperglycemics (From admission, onward)      Start     Stop Route Frequency Ordered    02/03/24 2100  insulin detemir U-100 (Levemir) pen 16 Units         -- SubQ Nightly 02/03/24 0734    02/03/24 0900  insulin detemir U-100 (Levemir) pen 28 Units         -- SubQ Daily 02/03/24  0734    02/02/24 0841  insulin aspart U-100 pen 1-15 Units         -- SubQ Before meals & nightly PRN 02/02/24 0842          Hold Oral hypoglycemics while patient is in the hospital.  -glucose values improving, down to 130 and 112.       VTE Risk Mitigation (From admission, onward)           Ordered     heparin (porcine) injection 5,000 Units  Every 8 hours         02/02/24 0244     IP VTE LOW RISK PATIENT  Once         02/02/24 0244     Place sequential compression device  Until discontinued         02/02/24 0244                    Discharge Planning   CONSUELO:      Code Status: Full Code   Is the patient medically ready for discharge?:     Reason for patient still in hospital (select all that apply): Patient unstable, Laboratory test, Treatment, and Consult recommendations  Discharge Plan A: Home, Home with family                  Xander Donaldson MD  Department of Hospital Medicine   [facility] [place] [facility]

## 2024-02-04 NOTE — PLAN OF CARE
Problem: Adult Inpatient Plan of Care  Goal: Plan of Care Review  Outcome: Ongoing, Progressing  Flowsheets (Taken 2/4/2024 0621)  Plan of Care Reviewed With: patient  Goal: Absence of Hospital-Acquired Illness or Injury  Outcome: Ongoing, Progressing  Goal: Optimal Comfort and Wellbeing  Outcome: Ongoing, Progressing  Intervention: Monitor Pain and Promote Comfort  Flowsheets (Taken 2/4/2024 0621)  Pain Management Interventions:   pillow support provided   quiet environment facilitated     Problem: Diabetes Comorbidity  Goal: Blood Glucose Level Within Targeted Range  Outcome: Ongoing, Progressing  Intervention: Monitor and Manage Glycemia  Flowsheets (Taken 2/4/2024 0621)  Glycemic Management: blood glucose monitored     Problem: Skin Injury Risk Increased  Goal: Skin Health and Integrity  Outcome: Ongoing, Progressing

## 2024-02-04 NOTE — NURSING
[facility] [place] -- 4 Eyes      2/3/2024       Skin assessed on: Q Shift      [x] No Pressure Injuries Present    [x]Prevention Measures Documented    [] Yes LDA  for Pressure Injury Previously documented     [] Yes New Pressure Injury Discovered   [] LDA for New Pressure Injury Added      Attending RN:  Annika Soto RN     Second RN:  ESTHER Wang

## 2024-02-04 NOTE — PROGRESS NOTES
Pharmacokinetic Initial Assessment: IV Vancomycin    Assessment/Plan:    Initiate intravenous vancomycin with loading dose of 1250 mg once followed by a maintenance dose of vancomycin 1000mg IV every 12 hours  Desired empiric serum trough concentration is 10 to 20 mcg/mL  Draw vancomycin trough level 60 min prior to fourth dose on 2 /5/24 at approximately 21:30  Pharmacy will continue to follow and monitor vancomycin.      Please contact pharmacy at extension [phone] with any questions regarding this assessment.     Thank you for the consult,   Che Joe       Patient brief summary:  Rosemary Zee is a 23 y.o. female initiated on antimicrobial therapy with IV Vancomycin for treatment of suspected skin & soft tissue infection    Drug Allergies:   Review of patient's allergies indicates:  No Known Allergies    Actual Body Weight:   59.3 kg    Renal Function:   Estimated Creatinine Clearance: 100.5 mL/min (based on SCr of 0.7 mg/dL).,     Dialysis Method (if applicable):  N/A    CBC (last 72 hours):  Recent Labs   Lab Result Units 02/01/24 1955 02/02/24 0432 02/02/24 0433 02/03/24 0443 02/04/24  0607   WBC K/uL 13.57* 10.69  --  6.57 5.63   Hemoglobin g/dL 12.3 11.3*  --  10.6* 10.3*   Hemoglobin A1C %  --   --  >14.0*  --   --    Hematocrit % 35.6* 33.9*  --  30.7* 30.3*   Platelets K/uL 306 298  --  296 351   Gran % % 78.1* 73.0  --  46.4 30.3*   Lymph % % 9.2* 10.0*  --  42.6 58.3*   Mono % % 12.0 12.0  --  9.0 8.7   Eosinophil % % 0.0 0.0  --  0.8 0.9   Basophil % % 0.3 1.0  --  0.6 0.7   Differential Method  Automated Manual  --  Automated Automated       Metabolic Panel (last 72 hours):  Recent Labs   Lab Result Units 02/01/24 1914 02/01/24 1955 02/02/24 0432 02/02/24 2323 02/03/24 0443 02/04/24  0607   Sodium mmol/L  --  128* 133*  --  135* 141   Potassium mmol/L  --  3.9 4.0  --  3.3* 3.2*   Chloride mmol/L  --  100 106  --  107 110   CO2 mmol/L  --  17* 13*  --  19* 24   Glucose mg/dL  --  500*  "299*  --  235* 112*   Glucose, UA  4+*  --   --  4+*  --   --    BUN mg/dL  --  10 6  --  6 5*   Creatinine mg/dL  --  1.3 0.8  --  0.7 0.7   Albumin g/dL  --  3.4* 3.0*  --  2.5* 2.5*   Total Bilirubin mg/dL  --  0.3 0.2  --  0.2 0.2   Alkaline Phosphatase U/L  --  93 88  --  96 91   AST U/L  --  14 30  --  35 28   ALT U/L  --  14 21  --  23 29       Drug levels (last 3 results):  No results for input(s): "VANCOMYCINRA", "VANCORANDOM", "VANCOMYCINPE", "VANCOPEAK", "VANCOMYCINTR", "VANCOTROUGH" in the last 72 hours.    Microbiologic Results:  Microbiology Results (last 7 days)       Procedure Component Value Units Date/Time    Urine culture [[id number]] Collected: 02/02/24 2323    Order Status: Completed Specimen: Urine Updated: 02/04/24 0958     Urine Culture, Routine No growth    Narrative:      Specimen Source->Urine    Aerobic culture [[id number]]  (Abnormal)  (Susceptibility) Collected: 02/02/24 0425    Order Status: Completed Specimen: Wound from Vaginal Updated: 02/04/24 0859     Aerobic Bacterial Culture METHICILLIN RESISTANT STAPHYLOCOCCUS AUREUS  Moderate  Called to Rickie Montejo- 4 02/04/2024  08:59      Urine culture [[id number]]  (Abnormal)  (Susceptibility) Collected: 02/01/24 1914    Order Status: Completed Specimen: Urine Updated: 02/04/24 0857     Urine Culture, Routine METHICILLIN RESISTANT STAPHYLOCOCCUS AUREUS  10,000 - 49,999 cfu/ml  Called to Mounika Montejo- 4W 02/04/2024  08:57      Narrative:      Specimen Source->Urine    Blood Culture #1 **CANNOT BE ORDERED STAT** [[id number]] Collected: 02/01/24 1954    Order Status: Completed Specimen: Blood from Peripheral, Antecubital, Right Updated: 02/03/24 2103     Blood Culture, Routine No Growth to date      No Growth to date      No Growth to date    Blood Culture #2 **CANNOT BE ORDERED STAT** [[id number]] Collected: 02/01/24 1955    Order Status: Completed Specimen: Blood from Peripheral, Antecubital, Left Updated: 02/03/24 2103     " Blood Culture, Routine No Growth to date      No Growth to date      No Growth to date    Aerobic culture [[id number]]     Order Status: No result Specimen: Wound from Foot, Right     C. trachomatis/N. gonorrhoeae by [facility]; Vagina [[id number]] Collected: 02/02/24 0425    Order Status: Canceled Specimen: Genital from Vagina     Culture, Anaerobe [[id number]]     Order Status: Canceled Specimen: Wound from Foot, Right     Blood culture x two cultures. Draw prior to antibiotics. [[id number]]     Order Status: Canceled Specimen: Blood     Blood culture x two cultures. Draw prior to antibiotics. [[id number]]     Order Status: Canceled Specimen: Blood

## 2024-02-04 NOTE — ASSESSMENT & PLAN NOTE
Patient's FSGs are uncontrolled due to hyperglycemia on current medication regimen.  Last A1c reviewed-   Lab Results   Component Value Date    HGBA1C >14.0 (H) 02/02/2024     Most recent fingerstick glucose reviewed-   Recent Labs   Lab 02/03/24  1143 02/03/24  1615 02/03/24  1947 02/04/24  0729   POCTGLUCOSE 191* 321* 309* 130*       Current correctional scale: high  Increase anti-hyperglycemic dose as follows-   Antihyperglycemics (From admission, onward)      Start     Stop Route Frequency Ordered    02/03/24 2100  insulin detemir U-100 (Levemir) pen 16 Units         -- SubQ Nightly 02/03/24 0734    02/03/24 0900  insulin detemir U-100 (Levemir) pen 28 Units         -- SubQ Daily 02/03/24 0734    02/02/24 0841  insulin aspart U-100 pen 1-15 Units         -- SubQ Before meals & nightly PRN 02/02/24 0842          Hold Oral hypoglycemics while patient is in the hospital.  -glucose values improving, down to 130 and 112.

## 2024-02-04 NOTE — ASSESSMENT & PLAN NOTE
Do not suspect true UTI  Urinalysis showed 1+ leukocyte esterase, microscopy with 23 WBC, 8 RBC  Symptoms may maybe due to ulcers around the genital area  Fu with ucx , urine culture no growth.  Blood culture no growth.  -GC and chlamydia screen negative.  -RPR negative

## 2024-02-04 NOTE — ASSESSMENT & PLAN NOTE
Suspect herpes simplex outbreak. Check HSV 1&2 titers  -gynecology consultation  Start Valtrex, continue antifungals  -blood cultures no growth.  -vaginal wound culture growing MRSA.  -consult Infectious Disease  -add vancomycin, pharmacy to dose  -urine  culture growing Staphylococcus aureus, with ID and sensitivity pending.  -GC and chlamydia culture negative.  -check HIV screening test

## 2024-02-05 VITALS
HEIGHT: 62 IN | DIASTOLIC BLOOD PRESSURE: 87 MMHG | TEMPERATURE: 98 F | OXYGEN SATURATION: 98 % | SYSTOLIC BLOOD PRESSURE: 141 MMHG | HEART RATE: 73 BPM | BODY MASS INDEX: 24.07 KG/M2 | RESPIRATION RATE: 16 BRPM | WEIGHT: 130.81 LBS

## 2024-02-05 PROBLEM — N17.9 AKI (ACUTE KIDNEY INJURY): Status: ACTIVE | Noted: 2024-02-05

## 2024-02-05 LAB
ANION GAP SERPL CALC-SCNC: 11 MMOL/L (ref 8–16)
BACTERIA BLD CULT: NORMAL
BACTERIA BLD CULT: NORMAL
BASOPHILS # BLD AUTO: 0.06 K/UL (ref 0–0.2)
BASOPHILS NFR BLD: 0.8 % (ref 0–1.9)
BUN SERPL-MCNC: 8 MG/DL (ref 6–20)
CALCIUM SERPL-MCNC: 8.9 MG/DL (ref 8.7–10.5)
CHLORIDE SERPL-SCNC: 110 MMOL/L (ref 95–110)
CO2 SERPL-SCNC: 20 MMOL/L (ref 23–29)
CREAT SERPL-MCNC: 2 MG/DL (ref 0.5–1.4)
DIFFERENTIAL METHOD BLD: ABNORMAL
EOSINOPHIL # BLD AUTO: 0.1 K/UL (ref 0–0.5)
EOSINOPHIL NFR BLD: 1 % (ref 0–8)
ERYTHROCYTE [DISTWIDTH] IN BLOOD BY AUTOMATED COUNT: 12 % (ref 11.5–14.5)
EST. GFR  (NO RACE VARIABLE): 36 ML/MIN/1.73 M^2
GLUCOSE SERPL-MCNC: 222 MG/DL (ref 70–110)
HCT VFR BLD AUTO: 33.7 % (ref 37–48.5)
HGB BLD-MCNC: 11.3 G/DL (ref 12–16)
HSV1 IGG SERPL QL IA: NEGATIVE
HSV2 IGG SERPL QL IA: NEGATIVE
IMM GRANULOCYTES # BLD AUTO: 0.08 K/UL (ref 0–0.04)
IMM GRANULOCYTES NFR BLD AUTO: 1 % (ref 0–0.5)
LYMPHOCYTES # BLD AUTO: 2.3 K/UL (ref 1–4.8)
LYMPHOCYTES NFR BLD: 30.4 % (ref 18–48)
MCH RBC QN AUTO: 27.6 PG (ref 27–31)
MCHC RBC AUTO-ENTMCNC: 33.5 G/DL (ref 32–36)
MCV RBC AUTO: 82 FL (ref 82–98)
MONOCYTES # BLD AUTO: 1.1 K/UL (ref 0.3–1)
MONOCYTES NFR BLD: 14.7 % (ref 4–15)
NEUTROPHILS # BLD AUTO: 4 K/UL (ref 1.8–7.7)
NEUTROPHILS NFR BLD: 52.1 % (ref 38–73)
NRBC BLD-RTO: 0 /100 WBC
PLATELET # BLD AUTO: 407 K/UL (ref 150–450)
PMV BLD AUTO: 10 FL (ref 9.2–12.9)
POCT GLUCOSE: 206 MG/DL (ref 70–110)
POCT GLUCOSE: 218 MG/DL (ref 70–110)
POTASSIUM SERPL-SCNC: 3.6 MMOL/L (ref 3.5–5.1)
RBC # BLD AUTO: 4.09 M/UL (ref 4–5.4)
SODIUM SERPL-SCNC: 141 MMOL/L (ref 136–145)
WBC # BLD AUTO: 7.69 K/UL (ref 3.9–12.7)

## 2024-02-05 PROCEDURE — 25000003 PHARM REV CODE 250: Performed by: INTERNAL MEDICINE

## 2024-02-05 PROCEDURE — 80048 BASIC METABOLIC PNL TOTAL CA: CPT | Performed by: INTERNAL MEDICINE

## 2024-02-05 PROCEDURE — 63600175 PHARM REV CODE 636 W HCPCS: Performed by: STUDENT IN AN ORGANIZED HEALTH CARE EDUCATION/TRAINING PROGRAM

## 2024-02-05 PROCEDURE — 36415 COLL VENOUS BLD VENIPUNCTURE: CPT | Performed by: INTERNAL MEDICINE

## 2024-02-05 PROCEDURE — 85025 COMPLETE CBC W/AUTO DIFF WBC: CPT | Performed by: INTERNAL MEDICINE

## 2024-02-05 PROCEDURE — 25000003 PHARM REV CODE 250: Performed by: STUDENT IN AN ORGANIZED HEALTH CARE EDUCATION/TRAINING PROGRAM

## 2024-02-05 PROCEDURE — 25000003 PHARM REV CODE 250: Performed by: FAMILY MEDICINE

## 2024-02-05 PROCEDURE — 99223 1ST HOSP IP/OBS HIGH 75: CPT | Mod: ,,, | Performed by: INTERNAL MEDICINE

## 2024-02-05 PROCEDURE — 63600175 PHARM REV CODE 636 W HCPCS: Performed by: INTERNAL MEDICINE

## 2024-02-05 RX ORDER — FLUCONAZOLE 150 MG/1
150 TABLET ORAL DAILY
Status: DISCONTINUED | OUTPATIENT
Start: 2024-02-06 | End: 2024-02-05 | Stop reason: HOSPADM

## 2024-02-05 RX ORDER — FAMOTIDINE 20 MG/1
20 TABLET, FILM COATED ORAL 2 TIMES DAILY
Qty: 20 TABLET | Refills: 0 | COMMUNITY
Start: 2024-02-05 | End: 2024-05-09

## 2024-02-05 RX ORDER — FLUCONAZOLE 150 MG/1
150 TABLET ORAL DAILY
Qty: 3 TABLET | Refills: 0 | Status: SHIPPED | OUTPATIENT
Start: 2024-02-06 | End: 2024-02-09

## 2024-02-05 RX ORDER — VALACYCLOVIR HYDROCHLORIDE 1 G/1
1000 TABLET, FILM COATED ORAL 2 TIMES DAILY
Qty: 14 TABLET | Refills: 0 | Status: SHIPPED | OUTPATIENT
Start: 2024-02-05 | End: 2024-05-09

## 2024-02-05 RX ORDER — DOXYCYCLINE HYCLATE 100 MG
100 TABLET ORAL EVERY 12 HOURS
Qty: 14 TABLET | Refills: 0 | Status: SHIPPED | OUTPATIENT
Start: 2024-02-05 | End: 2024-02-12

## 2024-02-05 RX ORDER — SULFAMETHOXAZOLE AND TRIMETHOPRIM 800; 160 MG/1; MG/1
1 TABLET ORAL 2 TIMES DAILY
Status: DISCONTINUED | OUTPATIENT
Start: 2024-02-05 | End: 2024-02-05

## 2024-02-05 RX ORDER — DOXYCYCLINE HYCLATE 100 MG
100 TABLET ORAL EVERY 12 HOURS
Status: DISCONTINUED | OUTPATIENT
Start: 2024-02-05 | End: 2024-02-05 | Stop reason: HOSPADM

## 2024-02-05 RX ORDER — INSULIN ASPART 100 [IU]/ML
1-15 INJECTION, SOLUTION INTRAVENOUS; SUBCUTANEOUS
Qty: 15 ML | Refills: 0 | Status: SHIPPED | OUTPATIENT
Start: 2024-02-05 | End: 2025-02-04

## 2024-02-05 RX ADMIN — MICONAZOLE NITRATE: 20 POWDER TOPICAL at 08:02

## 2024-02-05 RX ADMIN — INSULIN ASPART 6 UNITS: 100 INJECTION, SOLUTION INTRAVENOUS; SUBCUTANEOUS at 08:02

## 2024-02-05 RX ADMIN — PIPERACILLIN AND TAZOBACTAM 4.5 G: 4; .5 INJECTION, POWDER, LYOPHILIZED, FOR SOLUTION INTRAVENOUS; PARENTERAL at 06:02

## 2024-02-05 RX ADMIN — PIPERACILLIN AND TAZOBACTAM 4.5 G: 4; .5 INJECTION, POWDER, LYOPHILIZED, FOR SOLUTION INTRAVENOUS; PARENTERAL at 12:02

## 2024-02-05 RX ADMIN — CLOTRIMAZOLE AND BETAMETHASONE DIPROPIONATE: 10; .64 CREAM TOPICAL at 08:02

## 2024-02-05 RX ADMIN — VALACYCLOVIR HYDROCHLORIDE 1000 MG: 500 TABLET, FILM COATED ORAL at 08:02

## 2024-02-05 RX ADMIN — HEPARIN SODIUM 5000 UNITS: 5000 INJECTION INTRAVENOUS; SUBCUTANEOUS at 06:02

## 2024-02-05 RX ADMIN — SULFAMETHOXAZOLE AND TRIMETHOPRIM 1 TABLET: 800; 160 TABLET ORAL at 08:02

## 2024-02-05 RX ADMIN — ONDANSETRON 8 MG: 8 TABLET, ORALLY DISINTEGRATING ORAL at 09:02

## 2024-02-05 RX ADMIN — INSULIN DETEMIR 28 UNITS: 100 INJECTION, SOLUTION SUBCUTANEOUS at 08:02

## 2024-02-05 RX ADMIN — INSULIN ASPART 6 UNITS: 100 INJECTION, SOLUTION INTRAVENOUS; SUBCUTANEOUS at 12:02

## 2024-02-05 RX ADMIN — FLUCONAZOLE 200 MG: 2 INJECTION, SOLUTION INTRAVENOUS at 03:02

## 2024-02-05 NOTE — PLAN OF CARE
From home. Lives alone. Mother is help when needed. Admitted for vaginitis with bacteria vaginosis and yeast infection. ID to make final recommendations. Patient may need 1 more day in hospital.        02/05/24 1009   Discharge Reassessment   Assessment Type Discharge Planning Reassessment   Did the patient's condition or plan change since previous assessment? Yes   Communicated CONSUELO with patient/caregiver Date not available/Unable to determine   Discharge Plan A Home with family   Discharge Plan B Home with family   DME Needed Upon Discharge  none   Transition of Care Barriers None   Why the patient remains in the hospital Requires continued medical care   Post-Acute Status   Post-Acute Authorization Other   Coverage Medicaid   Other Status No Post-Acute Service Needs   Discharge Delays None known at this time

## 2024-02-05 NOTE — NURSING
Pt discharged per MD order. IV removed. Catheter tip intact. No distress noted. Discharge instructions reviewed with pt and family by virtual nurse. Given the opportunity for questions. All questions answered. AVS given to pt and placed in blue folder. Patient verbalized understanding of all instructions. Vitals per chart. afebrile. No complaints of pain, N/V, diarrhea, or SOB.Pt refused wheelchair transport ambulated off unit to OP pharmacy with steady gait noted.

## 2024-02-05 NOTE — PLAN OF CARE
Problem: Adult Inpatient Plan of Care  Goal: Plan of Care Review  Outcome: Adequate for Care Transition  Goal: Patient-Specific Goal (Individualized)  Outcome: Adequate for Care Transition  Goal: Absence of Hospital-Acquired Illness or Injury  Outcome: Adequate for Care Transition  Goal: Optimal Comfort and Wellbeing  Outcome: Adequate for Care Transition  Goal: Readiness for Transition of Care  Outcome: Adequate for Care Transition     Problem: Diabetes Comorbidity  Goal: Blood Glucose Level Within Targeted Range  Outcome: Adequate for Care Transition     Problem: Skin Injury Risk Increased  Goal: Skin Health and Integrity  Outcome: Adequate for Care Transition     Problem: Infection  Goal: Absence of Infection Signs and Symptoms  Outcome: Adequate for Care Transition     Problem: Fluid and Electrolyte Imbalance (Acute Kidney Injury/Impairment)  Goal: Fluid and Electrolyte Balance  Outcome: Adequate for Care Transition     Problem: Oral Intake Inadequate (Acute Kidney Injury/Impairment)  Goal: Optimal Nutrition Intake  Outcome: Adequate for Care Transition     Problem: Renal Function Impairment (Acute Kidney Injury/Impairment)  Goal: Effective Renal Function  Outcome: Adequate for Care Transition

## 2024-02-05 NOTE — ASSESSMENT & PLAN NOTE
24 yo woman admitted with labial infection, cellulitis due to MRSA  - possible primary HSV with superinfection, Candida/MRSA  - overall, improved  - okay to transition to po therapy from an ID standpoint  - Valtrex, Diflucan, and Bactrim DS x 7 days with Gyn f/u

## 2024-02-05 NOTE — PLAN OF CARE
Problem: Adult Inpatient Plan of Care  Goal: Plan of Care Review  Outcome: Met  Goal: Patient-Specific Goal (Individualized)  Outcome: Met  Goal: Absence of Hospital-Acquired Illness or Injury  Outcome: Met  Goal: Optimal Comfort and Wellbeing  Outcome: Met  Goal: Readiness for Transition of Care  Outcome: Met     Problem: Diabetes Comorbidity  Goal: Blood Glucose Level Within Targeted Range  Outcome: Met     Problem: Skin Injury Risk Increased  Goal: Skin Health and Integrity  Outcome: Met     Problem: Infection  Goal: Absence of Infection Signs and Symptoms  Outcome: Met     Problem: Fluid and Electrolyte Imbalance (Acute Kidney Injury/Impairment)  Goal: Fluid and Electrolyte Balance  Outcome: Met     Problem: Oral Intake Inadequate (Acute Kidney Injury/Impairment)  Goal: Optimal Nutrition Intake  Outcome: Met     Problem: Renal Function Impairment (Acute Kidney Injury/Impairment)  Goal: Effective Renal Function  Outcome: Met

## 2024-02-05 NOTE — HPI
Ms. Zee is a pleasant 22 yo woman who presented to the ED with whitish vaginal discharge, redness and swelling of her vulvar area, she first noticed the ' whitish bump' 5 days prior to presentation and was seen at an outside ED ([facility]) where acyclovir and fluconazole was prescribed but did not start taking them.  She denies associated fever or chills.  She does have dysuria. No history of previous STI. Seen by Gyn. Now on vancomycin, Diflucan, and Valtrex. ID is consulted for above. The paitent is feeling better and is approaching discharge.    RPR NR  HIV NR  HSV IgM, IgG pending  Urine cx: MRSA  Wound cx: MRSA

## 2024-02-05 NOTE — PLAN OF CARE
Problem: Adult Inpatient Plan of Care  Goal: Plan of Care Review  Outcome: Ongoing, Progressing  Flowsheets (Taken 2/5/2024 0612)  Plan of Care Reviewed With: patient  Goal: Optimal Comfort and Wellbeing  Outcome: Ongoing, Progressing  Goal: Readiness for Transition of Care  Outcome: Ongoing, Progressing     Problem: Skin Injury Risk Increased  Goal: Skin Health and Integrity  Outcome: Ongoing, Progressing     Problem: Infection  Goal: Absence of Infection Signs and Symptoms  Outcome: Ongoing, Progressing

## 2024-02-05 NOTE — CONSULTS
[facility] Surg  Infectious Disease  Consult Note    Patient Name: Rosemary Zee  MRN: [id number]  Admission Date: 2/1/2024  Hospital Length of Stay: 4 days  Attending Physician: Georgiana Jang MD  Primary Care Provider: Marlys Carreon MD     Isolation Status: Contact    Patient information was obtained from patient, past medical records, and ER records.      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  - Valtrex, Diflucan, and doxy x 7 days with Gyn f/u    Thank you for your consult. I will sign off. Please contact us if you have any additional questions.    Laz Prieto MD  Infectious Disease  [facility]    Subjective:     Principal Problem: Labial infection    HPI: Ms. Zee is a pleasant 24 yo woman who presented to the ED with whitish vaginal discharge, redness and swelling of her vulvar area, she first noticed the ' whitish bump' 5 days prior to presentation and was seen at an outside ED ([facility]) where acyclovir and fluconazole was prescribed but did not start taking them.  She denies associated fever or chills.  She does have dysuria. No history of previous STI. Seen by Gyn. Now on vancomycin, Diflucan, and Valtrex. ID is consulted for above. The paitent is feeling better and is approaching discharge.    RPR NR  HIV NR  HSV IgM, IgG pending  Urine cx: MRSA  Wound cx: MRSA    Past Medical History:   Diagnosis Date    Diabetes mellitus        History reviewed. No pertinent surgical history.    Review of patient's allergies indicates:  No Known Allergies    Medications:  Facility-Administered Medications Prior to  Admission   Medication    medroxyPROGESTERone (DEPO-PROVERA) injection 150 mg     Medications Prior to Admission   Medication Sig    acyclovir (ZOVIRAX) 400 MG tablet Take 1 tablet (400 mg total) by mouth 3 (three) times daily. for 10 days    fluconazole (DIFLUCAN) 200 MG Tab Take 1 tablet (200 mg total) by mouth once daily. for 7 days    insulin detemir U-100 (LEVEMIR) 100 unit/mL injection Inject 8 Units into the skin every evening.     Antibiotics (From admission, onward)      Start     Stop Route Frequency Ordered    02/05/24 0900  sulfamethoxazole-trimethoprim 800-160mg per tablet 1 tablet         -- Oral 2 times daily 02/05/24 0740          Antifungals (From admission, onward)      Start     Stop Route Frequency Ordered    02/04/24 1130  clotrimazole-betamethasone 1-0.05% cream         -- Top 2 times daily 02/04/24 1017    02/02/24 1130  miconazole NITRATE 2 % top powder         -- Top 2 times daily 02/02/24 1128    02/02/24 0400  fluconazole (DIFLUCAN) IVPB 200 mg         -- IV Every 24 hours (non-standard times) 02/02/24 0246          Antivirals (From admission, onward)          Stop Route Frequency     valACYclovir         -- Oral 2 times daily               There is no immunization history on file for this patient.    Family History       Problem Relation (Age of Onset)    Diabetes Mother, Maternal Grandfather, Paternal Grandmother    Heart attacks under age 50 Mother    Stroke Mother          Social History     Socioeconomic History    Marital status: Single   Tobacco Use    Smoking status: Passive Smoke Exposure - Never Smoker    Smokeless tobacco: Never   Substance and Sexual Activity    Alcohol use: No    Drug use: No    Sexual activity: Yes     Partners: Male   Social History Narrative    Lives at home with mom and mom's boyfriend. Has siblings that do not live with her, but they are healthy per report.    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     Review of Systems    Constitutional:  Negative for chills and fever.   Genitourinary:  Negative for dysuria.   Skin:  Positive for color change.   All other systems reviewed and are negative.    Objective:     Vital Signs (Most Recent):  Temp: 98.7 °F (37.1 °C) (02/05/24 0745)  Pulse: 69 (02/05/24 0745)  Resp: 16 (02/05/24 0745)  BP: (!) 144/85 (02/05/24 0745)  SpO2: 100 % (02/05/24 0745) Vital Signs (24h Range):  Temp:  [97.5 °F (36.4 °C)-99.3 °F (37.4 °C)] 98.7 °F (37.1 °C)  Pulse:  [69-83] 69  Resp:  [15-18] 16  SpO2:  [98 %-100 %] 100 %  BP: (125-144)/(74-90) 144/85     Weight: 59.3 kg (130 lb 12.8 oz)  Body mass index is 23.92 kg/m².    Estimated Creatinine Clearance: 35.2 mL/min (A) (based on SCr of 2 mg/dL (H)).     Physical Exam  Vitals and nursing note reviewed.   Constitutional:       Appearance: Normal appearance.   HENT:      Head: Normocephalic and atraumatic.   Eyes:      Extraocular Movements: Extraocular movements intact.   Genitourinary:     Comments: Mild vulvar inflammation with scant discharge  Neurological:      General: No focal deficit present.      Mental Status: She is alert and oriented to person, place, and time. Mental status is at baseline.   Psychiatric:         Mood and Affect: Mood normal.         Behavior: Behavior normal.         Thought Content: Thought content normal.         Judgment: Judgment normal.          Significant Labs:   Microbiology Results (last 7 days)       Procedure Component Value Units Date/Time    Blood Culture #1 **CANNOT BE ORDERED STAT** [[id number]] Collected: 02/01/24 1954    Order Status: Completed Specimen: Blood from Peripheral, Antecubital, Right Updated: 02/04/24 2103     Blood Culture, Routine No Growth to date      No Growth to date      No Growth to date      No Growth to date    Blood Culture #2 **CANNOT BE ORDERED STAT** [[id number]] Collected: 02/01/24 1955    Order Status: Completed Specimen: Blood from Peripheral, Antecubital, Left Updated: 02/04/24 2104     Blood  Culture, Routine No Growth to date      No Growth to date      No Growth to date      No Growth to date    Urine culture [[id number]] Collected: 02/02/24 2323    Order Status: Completed Specimen: Urine Updated: 02/04/24 0958     Urine Culture, Routine No growth    Narrative:      Specimen Source->Urine    Aerobic culture [[id number]]  (Abnormal)  (Susceptibility) Collected: 02/02/24 0425    Order Status: Completed Specimen: Wound from Vaginal Updated: 02/04/24 0859     Aerobic Bacterial Culture METHICILLIN RESISTANT STAPHYLOCOCCUS AUREUS  Moderate  Called to Rickie Marrufo 4JOSÉ MIGUEL 02/04/2024  08:59      Urine culture [[id number]]  (Abnormal)  (Susceptibility) Collected: 02/01/24 1914    Order Status: Completed Specimen: Urine Updated: 02/04/24 0857     Urine Culture, Routine METHICILLIN RESISTANT STAPHYLOCOCCUS AUREUS  10,000 - 49,999 cfu/ml  Called to Mounika Montejo- 4W 02/04/2024  08:57      Narrative:      Specimen Source->Urine    Aerobic culture [[id number]]     Order Status: No result Specimen: Wound from Foot, Right     C. trachomatis/N. gonorrhoeae by AMP [facility]; Vagina [[id number]] Collected: 02/02/24 0425    Order Status: Canceled Specimen: Genital from Vagina     Culture, Anaerobe [[id number]]     Order Status: Canceled Specimen: Wound from Foot, Right     Blood culture x two cultures. Draw prior to antibiotics. [[id number]]     Order Status: Canceled Specimen: Blood     Blood culture x two cultures. Draw prior to antibiotics. [[id number]]     Order Status: Canceled Specimen: Blood             Significant Imaging: I have reviewed all pertinent imaging results/findings within the past 24 hours.

## 2024-02-05 NOTE — SUBJECTIVE & OBJECTIVE
Past Medical History:   Diagnosis Date    Diabetes mellitus        History reviewed. No pertinent surgical history.    Review of patient's allergies indicates:  No Known Allergies    Medications:  Facility-Administered Medications Prior to Admission   Medication    medroxyPROGESTERone (DEPO-PROVERA) injection 150 mg     Medications Prior to Admission   Medication Sig    acyclovir (ZOVIRAX) 400 MG tablet Take 1 tablet (400 mg total) by mouth 3 (three) times daily. for 10 days    fluconazole (DIFLUCAN) 200 MG Tab Take 1 tablet (200 mg total) by mouth once daily. for 7 days    insulin detemir U-100 (LEVEMIR) 100 unit/mL injection Inject 8 Units into the skin every evening.     Antibiotics (From admission, onward)      Start     Stop Route Frequency Ordered    02/05/24 0900  sulfamethoxazole-trimethoprim 800-160mg per tablet 1 tablet         -- Oral 2 times daily 02/05/24 0740          Antifungals (From admission, onward)      Start     Stop Route Frequency Ordered    02/04/24 1130  clotrimazole-betamethasone 1-0.05% cream         -- Top 2 times daily 02/04/24 1017    02/02/24 1130  miconazole NITRATE 2 % top powder         -- Top 2 times daily 02/02/24 1128    02/02/24 0400  fluconazole (DIFLUCAN) IVPB 200 mg         -- IV Every 24 hours (non-standard times) 02/02/24 0246          Antivirals (From admission, onward)          Stop Route Frequency     valACYclovir         -- Oral 2 times daily               There is no immunization history on file for this patient.    Family History       Problem Relation (Age of Onset)    Diabetes Mother, Maternal Grandfather, Paternal Grandmother    Heart attacks under age 50 Mother    Stroke Mother          Social History     Socioeconomic History    Marital status: Single   Tobacco Use    Smoking status: Passive Smoke Exposure - Never Smoker    Smokeless tobacco: Never   Substance and Sexual Activity    Alcohol use: No    Drug use: No    Sexual activity: Yes     Partners: Male    Social History Narrative    Lives at home with mom and mom's boyfriend. Has siblings that do not live with her, but they are healthy per report.    Graduating high school, going to [employer] for nursing in the Fall.     Review of Systems   Constitutional:  Negative for chills and fever.   Genitourinary:  Negative for dysuria.   Skin:  Positive for color change.   All other systems reviewed and are negative.    Objective:     Vital Signs (Most Recent):  Temp: 98.7 °F (37.1 °C) (02/05/24 0745)  Pulse: 69 (02/05/24 0745)  Resp: 16 (02/05/24 0745)  BP: (!) 144/85 (02/05/24 0745)  SpO2: 100 % (02/05/24 0745) Vital Signs (24h Range):  Temp:  [97.5 °F (36.4 °C)-99.3 °F (37.4 °C)] 98.7 °F (37.1 °C)  Pulse:  [69-83] 69  Resp:  [15-18] 16  SpO2:  [98 %-100 %] 100 %  BP: (125-144)/(74-90) 144/85     Weight: 59.3 kg (130 lb 12.8 oz)  Body mass index is 23.92 kg/m².    Estimated Creatinine Clearance: 35.2 mL/min (A) (based on SCr of 2 mg/dL (H)).     Physical Exam  Vitals and nursing note reviewed.   Constitutional:       Appearance: Normal appearance.   HENT:      Head: Normocephalic and atraumatic.   Eyes:      Extraocular Movements: Extraocular movements intact.   Genitourinary:     Comments: Mild vulvar inflammation with scant discharge  Neurological:      General: No focal deficit present.      Mental Status: She is alert and oriented to person, place, and time. Mental status is at baseline.   Psychiatric:         Mood and Affect: Mood normal.         Behavior: Behavior normal.         Thought Content: Thought content normal.         Judgment: Judgment normal.          Significant Labs:   Microbiology Results (last 7 days)       Procedure Component Value Units Date/Time    Blood Culture #1 **CANNOT BE ORDERED STAT** [[id number]] Collected: 02/01/24 1954    Order Status: Completed Specimen: Blood from Peripheral, Antecubital, Right Updated: 02/04/24 2103     Blood Culture, Routine No Growth to date      No Growth to date       No Growth to date      No Growth to date    Blood Culture #2 **CANNOT BE ORDERED STAT** [[id number]] Collected: 02/01/24 1955    Order Status: Completed Specimen: Blood from Peripheral, Antecubital, Left Updated: 02/04/24 2103     Blood Culture, Routine No Growth to date      No Growth to date      No Growth to date      No Growth to date    Urine culture [[id number]] Collected: 02/02/24 2323    Order Status: Completed Specimen: Urine Updated: 02/04/24 0958     Urine Culture, Routine No growth    Narrative:      Specimen Source->Urine    Aerobic culture [[id number]]  (Abnormal)  (Susceptibility) Collected: 02/02/24 0425    Order Status: Completed Specimen: Wound from Vaginal Updated: 02/04/24 0859     Aerobic Bacterial Culture METHICILLIN RESISTANT STAPHYLOCOCCUS AUREUS  Moderate  Called to Rickie Montejo- 4W 02/04/2024  08:59      Urine culture [[id number]]  (Abnormal)  (Susceptibility) Collected: 02/01/24 1914    Order Status: Completed Specimen: Urine Updated: 02/04/24 0857     Urine Culture, Routine METHICILLIN RESISTANT STAPHYLOCOCCUS AUREUS  10,000 - 49,999 cfu/ml  Called to Mounika Montejo- 4W 02/04/2024  08:57      Narrative:      Specimen Source->Urine    Aerobic culture [[id number]]     Order Status: No result Specimen: Wound from Foot, Right     C. trachomatis/N. gonorrhoeae by AMP [facility]; Vagina [[id number]] Collected: 02/02/24 0425    Order Status: Canceled Specimen: Genital from Vagina     Culture, Anaerobe [[id number]]     Order Status: Canceled Specimen: Wound from Foot, Right     Blood culture x two cultures. Draw prior to antibiotics. [[id number]]     Order Status: Canceled Specimen: Blood     Blood culture x two cultures. Draw prior to antibiotics. [[id number]]     Order Status: Canceled Specimen: Blood             Significant Imaging: I have reviewed all pertinent imaging results/findings within the past 24 hours.

## 2024-02-05 NOTE — PLAN OF CARE
Case Management Final Discharge Note      Discharge Disposition: Home    New DME ordered / company name: None    Relevant SDOH / Transition of Care Barriers:  None    Primary Caretaker and contact information:      Sharon Zee (Mother)  [phone] (Mobile)       Scheduled followup appointment: PCP and GYN    Referrals placed: None    Transportation: family        Patient and family educated on discharge services and updated on DC plan. Bedside RN notified, patient clear to discharge from Case Management Perspective.        02/05/24 1149   Final Note   Assessment Type Final Discharge Note   Anticipated Discharge Disposition Home   What phone number can be called within the next 1-3 days to see how you are doing after discharge? [phone]   Hospital Resources/Appts/Education Provided Appointments scheduled and added to AVS   Post-Acute Status   Post-Acute Authorization Other   Coverage Medicaid   Other Status No Post-Acute Service Needs   Discharge Delays None known at this time

## 2024-02-05 NOTE — NURSING
[facility]  Nurses Note -- 4 Eyes      2/4/2024       Skin assessed on: Q Shift      [x] No Pressure Injuries Present    [x]Prevention Measures Documented    [] Yes LDA  for Pressure Injury Previously documented     [] Yes New Pressure Injury Discovered   [] LDA for New Pressure Injury Added      Attending RN:  Annika Soto RN     Second RN:  ESTHER Wang

## 2024-02-05 NOTE — ASSESSMENT & PLAN NOTE
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

## 2024-02-07 NOTE — ASSESSMENT & PLAN NOTE
Patient with acute kidney injury/acute renal failure likely due to  vancomycin.    1 L IVFs given. Will need to repeat labs with PCP next week

## 2024-02-07 NOTE — DISCHARGE SUMMARY
[facility] Medicine  Discharge Summary      Patient Name: Rosemary Zee  MRN: [id number]  DANYELLE: [id number]  Patient Class: IP- Inpatient  Admission Date: 2/1/2024  Hospital Length of Stay: 4 days  Discharge Date and Time: 2/5/2024  1:49 PM  Discharging Provider: Georgiana Jang MD  Primary Care Provider: Marlys Carreon MD    Primary Care Team: Networked reference to record PCT     HPI:   23-year-old  female with medical history significant for type 1 diabetes mellitus,medication non adherence with prior admission for DKA,who presented to the ED with whitish vaginal discharge,redness and swelling of her jake vaginal area, she first noticed the ' whitish bump' 5 days prior to presentation and was seen at an outside ED where acyclovir and fluconazole was prescribed but did not start taking them until a day prior to this presentation,she denied associated fever,chills or rigor,noticed dysuria which she attributed to the ulcer,no frequency,urgency,supra pubic pain or hematuria,she denied any abdominal pain,no nausea or vomiting,no change in bowel habit,denied diarrhea or constipation,she's sexually active and voiced using condom consistently,no previous history of STI.She voiced poorly controled diabetes, with random blood glucose mostly in the 300s,takes 6 units of Lantus twice daily.  Labs obtained in the ED showed  white blood cell count of 13.57,anion gap is 11. Lactic acid 1.2,beta hydroxy was elevated at 2.7. ESR 62 and .7, Urinalysis showed wbc 23 and 8 RBCs. RPR was non-reactive,CT abdominal pelvis showed no acute abnormality.          Hospital Course:   Ms. Zee is a pleasant 23-year-old female type 1 diabetes, history of DKA, and medication compliance who was admitted for labial infection.  Possible HSV.  Patient was started on Valtrex and fluconazole with miconazole cream.  Patient's RPR was negative.  Blood cultures no growth, chlamydia and GC screen is negative.   Labial cultures and Urine cx with MRSA. ID consulted. She was able to take oral antibiotics. Patient counseled to be compliant with medications,  Practice safe sex measures with the use of condom to reduce the spread of STD.     Goals of Care Treatment Preferences:  Code Status: Full Code      Consults:   Consults (From admission, onward)          Status Ordering Provider     Inpatient consult to Infectious Diseases  Once        Provider:  Anny Partida MD    Completed MARY ROSEN     Inpatient consult to Gynecology  Once        Provider:  Jordyn Desouza MD    Completed MARY ROSEN            Renal/  * Labial infection  Suspect herpes simplex outbreak. Check HSV 1&2 titers pending  - complete oral course of doxy/ valacyclovir per ID recs    BRENDA (acute kidney injury)  Patient with acute kidney injury/acute renal failure likely due to  vancomycin.    1 L IVFs given. Will need to repeat labs with PCP next week    UTI (urinary tract infection)  MRSA UTI  Complete oral course of doxy      ID  Screening examination for STD (sexually transmitted disease)  -GC/ chlamydia, RPR, HIV screening negative.  Fu HSV titers      Endocrine  Type 1 diabetes mellitus with hyperglycemia  Patient's FSGs are uncontrolled due to hyperglycemia    Last A1c reviewed-   Lab Results   Component Value Date    HGBA1C >14.0 (H) 02/02/2024     Very poor disease control  Fu with PCP. May need endo referral    Other  Non-compliance  She has not been compliant with her insulin regimen  OP DM educator program        Final Active Diagnoses:    Diagnosis Date Noted POA    PRINCIPAL PROBLEM:  Labial infection [N76.0] 02/02/2024 Yes    BRENDA (acute kidney injury) [N17.9] 02/05/2024 No    UTI (urinary tract infection) [N39.0] 02/02/2024 Yes    Screening examination for STD (sexually transmitted disease) [Z11.3] 01/30/2024 Not Applicable    Non-compliance [Z91.199] 01/19/2018 Not Applicable    Type 1 diabetes mellitus with hyperglycemia [E10.65]  08/30/2016 Yes      Problems Resolved During this Admission:       Discharged Condition: stable    Disposition: Home or Self Care    Follow Up:   Follow-up Information       Marlys Carreon MD. Go on 2/9/2024.    Specialty: Family Medicine  Why: Hospital F/U with PCP at 11:30 am.   repeat BMP  Contact information:  [address]  [facility] [place] [place] [address]  [phone]                           Patient Instructions:      Ambulatory referral/consult to Diabetes Education   Standing Status: Future   Referral Priority: Routine Referral Type: Consultation   Referral Reason: Specialty Services Required   Requested Specialty: Diabetes   Number of Visits Requested: 1 Expiration Date: 02/05/25     Diet diabetic     Activity as tolerated       Significant Diagnostic Studies: Microbiology: Urine Culture    Lab Results   Component Value Date    LABURIN No growth 02/02/2024       Pending Diagnostic Studies:       Procedure Component Value Units Date/Time    Herpes simplex type 1 & 2 IgM,Herpes IgM [[id number]] Collected: 02/02/24 1155    Order Status: Sent Lab Status: In process Updated: 02/02/24 1221    Specimen: Blood            Medications:  Reconciled Home Medications:      Medication List        START taking these medications      doxycycline 100 MG tablet  Commonly known as: VIBRA-TABS  Take 1 tablet (100 mg total) by mouth every 12 (twelve) hours. for 7 days     famotidine 20 MG tablet  Commonly known as: PEPCID  Take 1 tablet (20 mg total) by mouth 2 (two) times daily. for 10 days     * insulin detemir U-100 (Levemir) 100 unit/mL (3 mL) Inpn pen  Inject 16 Units into the skin every evening.  Replaces: insulin detemir U-100 100 unit/mL injection     * insulin detemir U-100 (Levemir) 100 unit/mL (3 mL) Inpn pen  Inject 28 Units into the skin once daily.     NovoLOG Flexpen U-100 Insulin 100 unit/mL (3 mL) Inpn pen  Generic drug: insulin aspart U-100  Inject 1-15 Units into the skin before meals and  "at bedtime as needed (Hyperglycemia). FSG , do nothing.151-200 give 3 units, 201-250 give 6 units, 251-300 give 9 units, 301-350 give 12 units, 351-400 give 15 units     valACYclovir 1000 MG tablet  Commonly known as: VALTREX  Take 1 tablet (1,000 mg total) by mouth 2 (two) times daily. for 7 days           * This list has 2 medication(s) that are the same as other medications prescribed for you. Read the directions carefully, and ask your doctor or other care provider to review them with you.                CHANGE how you take these medications      fluconazole 150 MG Tab  Commonly known as: DIFLUCAN  Take 1 tablet (150 mg total) by mouth once daily. for 3 days  What changed:   medication strength  how much to take            STOP taking these medications      acyclovir 400 MG tablet  Commonly known as: ZOVIRAX     insulin detemir U-100 100 unit/mL injection  Commonly known as: Levemir  Replaced by: insulin detemir U-100 (Levemir) 100 unit/mL (3 mL) Inpn pen            ASK your doctor about these medications      BD ULTRA-FINE SHIRLEY PEN NEEDLE 32 gauge x 5/32" Ndle  Generic drug: pen needle, diabetic  USE TO INJECT INSULIN FOUR TIMES DAILY AS DIRECTED  Ask about: Which instructions should I use?              Indwelling Lines/Drains at time of discharge:   Lines/Drains/Airways       None                   Time spent on the discharge of patient: 35 minutes   Patient examined at bedside on day of discharge. Exam findings stable. She was deemed safe for discharge.        Georgiana Jang MD  Department of Hospital Medicine  [facility] S[facility]  "

## 2024-02-07 NOTE — ASSESSMENT & PLAN NOTE
Suspect herpes simplex outbreak. Check HSV 1&2 titers pending  - complete oral course of doxy/ valacyclovir per ID recs

## 2024-02-07 NOTE — ASSESSMENT & PLAN NOTE
Patient's FSGs are uncontrolled due to hyperglycemia    Last A1c reviewed-   Lab Results   Component Value Date    HGBA1C >14.0 (H) 02/02/2024     Very poor disease control  Fu with PCP. May need endo referral

## 2024-02-23 ENCOUNTER — CLINICAL SUPPORT (OUTPATIENT)
Dept: DIABETES | Facility: CLINIC | Age: 24
End: 2024-02-23
Payer: MEDICAID

## 2024-02-23 VITALS — BODY MASS INDEX: 25.91 KG/M2 | WEIGHT: 140.81 LBS | HEIGHT: 62 IN

## 2024-02-23 DIAGNOSIS — E10.65 UNCONTROLLED TYPE 1 DIABETES MELLITUS WITH HYPERGLYCEMIA: ICD-10-CM

## 2024-02-23 PROCEDURE — 99999 PR PBB SHADOW E&M-EST. PATIENT-LVL I: CPT | Mod: PBBFAC,,,

## 2024-02-23 PROCEDURE — 99999PBSHW PR PBB SHADOW TECHNICAL ONLY FILED TO HB: Mod: PBBFAC,,,

## 2024-02-23 PROCEDURE — G0108 DIAB MANAGE TRN  PER INDIV: HCPCS | Mod: PBBFAC

## 2024-02-23 PROCEDURE — 99211 OFF/OP EST MAY X REQ PHY/QHP: CPT | Mod: PBBFAC

## 2024-02-26 NOTE — PROGRESS NOTES
"Diabetes Care Specialist Progress Note  Author: Tamra Borges RN  Date: 2/23/2024    Program Intake  Reason for Diabetes Program Visit:: Initial Diabetes Assessment  Current diabetes risk level:: high  In the last 12 months, have you:: none  Permission to speak with others about care:: yes  Continuous Glucose Monitoring  Patient has CGM: No    Lab Results   Component Value Date    HGBA1C >14.0 (H) 02/02/2024       Clinical  Weight: 63.9 kg (140 lb 12.8 oz)   Height: 5' 2" (157.5 cm)   Body mass index is 25.75 kg/m².    Problem Review  Active comorbidities affecting diabetes self-care.: yes    Clinical Assessment  Current Diabetes Treatment: Insulin (Levemir 28U daily and Novolog 1-15 (151-200= 3U, 201-250= 6U))  Have you ever experienced hypoglycemia (low blood sugar)?: no  Have you ever experienced hyperglycemia (high blood sugar)?: no    Medication Information  How do you obtain your medications?: Patient drives  How many days a week do you miss your medications?: Never  Do you sometimes have difficulty refilling your medications?: No  Medication adherence impacting ability to self-manage diabetes?: No    Labs  Do you have regular lab work to monitor your medications?: Yes  Type of Regular Lab Work: A1c  Where do you get your labs drawn?: [facility]  Lab Compliance Barriers: No    Nutritional Status  Diet: Regular  Meal Plan 24 Hour Recall: Breakfast, Lunch, Dinner, Snack  Meal Plan 24 Hour Recall - Breakfast: sandwich and orange juice 8-12 oz  Meal Plan 24 Hour Recall - Lunch: chicken wings and lemonade  Meal Plan 24 Hour Recall - Dinner: noodles and water  Meal Plan 24 Hour Recall - Snack: chips  Change in appetite?: No  Recent Changes in Weight: No Recent Weight Change  Current nutritional status an area of need that is impacting patient's ability to self-manage diabetes?: No    Additional Social History  Support  Does anyone support you with your diabetes care?: yes  Who supports you?: self  Who takes you to " your medical appointments?: self  Does the current support meet the patient's needs?: Yes  Is Support an area impacting ability to self-manage diabetes?: No    Access to Mass Media & Technology  Does the patient have access to any of the following devices or technologies?: Smart phone  Media or technology needs impacting ability to self-manage diabetes?: No    Cognitive/Behavioral Health  Alert and Oriented: Yes  Difficulty Thinking: No  Requires Prompting: No  Requires assistance for routine expression?: No  Cognitive or behavioral barriers impacting ability to self-manage diabetes?: No    Culture/Mormonism  Culture or Episcopal beliefs that may impact ability to access healthcare: No    Communication  Language preference: English  Hearing Problems: No  Communication needs impacting ability to self-manage diabetes?: No    Health Literacy  Preferred Learning Method: Face to Face, Reading Materials  How often do you need to have someone help you read instructions, pamphlets, or written material from your doctor or pharmacy?: Never  Health literacy needs impacting ability to self-manage diabetes?: No      Diabetes Self-Management Skills Assessment  Diabetes Disease Process/Treatment Options  Patient/caregiver able to state what happens when someone has diabetes.: yes  Patient/caregiver knows what type of diabetes they have.: yes  Diabetes Type : Type I  Patient/caregiver able to identify at least three signs and symptoms of diabetes.: no  Patient able to identify at least three risk factors for diabetes.: no  Diabetes Disease Process/Treatment Options: Skills Assessment Completed: Yes  Assessment indicates:: Instruction Needed, Knowledge deficit  Area of need?: Yes    Nutrition/Healthy Eating  Challenges to healthy eating:: portion control  Method of carbohydrate measurement:: no method  Patient can identify foods that impact blood sugar.: yes  Patient-identified foods:: starches (bread, pasta, rice, cereal), sweets,  starchy vegetables (corn, peas, beans), soda, fruit/fruit juice  Nutrition/Healthy Eating Skills Assessment Completed:: Yes  Assessment indicates:: Instruction Needed, Knowledge deficit  Area of need?: Yes    Physical Activity/Exercise  Physical Activity/Exercise Skills Assessment Completed: : No  Deffered due to:: Time    Medications  Patient is able to describe current diabetes management routine.: yes  Diabetes management routine:: diet, insulin  Patient is able to identify current diabetes medications, dosages, and appropriate timing of medications.: yes  Patient understands the purpose of the medications taken for diabetes.: yes  Patient reports problems or concerns with current medication regimen.: no  Medication Skills Assessment Completed:: Yes  Assessment indicates:: Instruction Needed  Area of need?: No    Home Blood Glucose Monitoring  Patient states that blood sugar is checked at home daily.: yes  Monitoring Method:: home glucometer  Home glucometer meter type:: Insurance Preferred Meter  How often do you check your blood sugar?: 3 times a day  When do you check your blood sugar?: Before breakfast, Before lunch, Before dinner  When you check what is your typical blood sugar range? : >140  Blood glucose logs:: no, encouraged to keep logs, encouraged to bring logs to provider visits  Blood glucose logs reviewed today?: no  Home Blood Glucose Monitoring Skills Assessment Completed: : Yes  Assessment indicates:: Instruction Needed, Knowledge deficit  Area of need?: Yes    Acute Complications  Patient is able to identify types of acute complications: No  Acute Complications Skills Assessment Completed: : Yes  Assessment indicates:: Instruction Needed, Knowledge deficit  Area of need?: Yes    Chronic Complications  Chronic Complications Skills Assessment Completed: : No  Deferred due to:: Time    Psychosocial/Coping  Psychosocial/Coping Skills Assessment Completed: : No  Deffered due to:: Time      Assessment  Summary and Plan    Based on today's diabetes care assessment, the following areas of need were identified:          2/23/2024    12:02 AM   Social   Support No   Access to Mass Media/Tech No   Cognitive/Behavioral Health No   Culture/Quaker No   Communication No   Health Literacy No            2/23/2024    12:02 AM   Clinical   Medication Adherence No   Lab Compliance No   Nutritional Status No            2/23/2024    12:02 AM   Diabetes Self-Management Skills   Diabetes Disease Process/Treatment Options Yes- Provided DM management guide and discussed what is diabetes and the significance of current A1c and blood glucose goals.   Nutrition/Healthy Eating Yes- see care planning   Medication No   Home Blood Glucose Monitoring Yes- Discussed BS goals and importance of monitoring and recording BS for review and maintenance of medication. Patient encouraged to document glucose results and bring them to every clinic visit.   Acute Complications Yes- Discussed BS goals and importance of monitoring and recording BS for review and maintenance of medication.      Today's interventions were provided through individual discussion, instruction, and written materials were provided.      Patient verbalized understanding of instruction and written materials.  Pt was able to return back demonstration of instructions today. Patient understood key points, needs reinforcement and further instruction.     Diabetes Self-Management Care Plan:    Today's Diabetes Self-Management Care Plan was developed with Rosemary Hernandez's input. Rosemary Hernandez has agreed to work toward the following goal(s) to improve his/her overall diabetes control.      Care Plan: Diabetes Management   Updates made since 1/27/2024 12:00 AM        Problem: Healthy Eating         Goal: Eat 2-3 meals daily with 30-45g/2-3 servings of Carbohydrate per meal. Limit snacking in between meal to 1 serving (15 grams).    Start Date: 2/23/2024   Expected End Date: 3/22/2024    Priority: High   Barriers: No Barriers Identified   Note:    2/23/24 - - We concentrated on portion sizes at today's session. Overall meal planning and various choices for meals. Discussed carb vs non-carb foods, appropriate amount of carbs to have at meals/snacks and acceptable serving sizes of individual carb items. Obtained a 24-hr food recall from patient. Discussed various meal plan options to promote healthy eating.    - - Practiced reading food labels on various food items with patient focusing on serving size and total carbohydrate intake (not sugar intake). Instructed on appropriate serving sizes of specific carb containing foods. Discussed other options of measuring portion sizes by using the hand method and/or using the plate method for meal planning. Stressed importance of eating a protein at each meal and include non-starchy vegetables at lunch/dinner. Instructed pt to aim for 2-3 evenly spaced meals or a small carb snack in place of a missed meal. Written education information provided to patient for use at home. Pt verbalized understanding of all the above.         Task: Reviewed the sources and role of Carbohydrate, Protein, and Fat and how each nutrient impacts blood sugar. Completed 2/23/2024        Task: Provided visual examples using dry measuring cups, food models, and other familiar objects such as computer mouse, deck or cards, tennis ball etc. to help with visualization of portions. Completed 2/23/2024        Task: Explained how to count carbohydrates using the food label and the use of dry measuring cups for accurate carb counting. Completed 2/23/2024       Task: Recommended replacing beverages containing high sugar content with noncaloric/sugar free options and/or water. Completed 2/23/2024        Task: Review the importance of balancing carbohydrates with each meal using portion control techniques to count servings of carbohydrate and label reading to identify serving size and amount of  total carbs per serving. Completed 2/23/2024        Task: Provided Sample plate method and reviewed the use of the plate to estimate amounts of carbohydrate per meal. Completed 2/23/2024          Follow Up Plan     Follow up in about 4 weeks (around 3/22/2024) for F/U #1 review BS log and meal planning.    Today's care plan and follow up schedule was discussed with patient.  Rosemary Hernandze verbalized understanding of the care plan, goals, and agrees to follow up plan.        The patient was encouraged to communicate with his/her health care provider/physician and care team regarding his/her condition(s) and treatment.  I provided the patient with my contact information today and encouraged to contact me via phone or [facility]'s Patient Portal as needed.     Length of Visit   Total Time: 60 Minutes

## 2024-03-13 ENCOUNTER — CLINICAL SUPPORT (OUTPATIENT)
Dept: OBSTETRICS AND GYNECOLOGY | Facility: CLINIC | Age: 24
End: 2024-03-13
Payer: MEDICAID

## 2024-03-13 VITALS — WEIGHT: 139 LBS | BODY MASS INDEX: 25.42 KG/M2

## 2024-03-13 DIAGNOSIS — Z30.42 ENCOUNTER FOR MANAGEMENT AND INJECTION OF DEPO-PROVERA: Primary | ICD-10-CM

## 2024-03-13 PROCEDURE — 99999 PR PBB SHADOW E&M-EST. PATIENT-LVL I: CPT | Mod: PBBFAC,,,

## 2024-03-13 PROCEDURE — 96372 THER/PROPH/DIAG INJ SC/IM: CPT | Mod: PBBFAC

## 2024-03-13 PROCEDURE — 99999PBSHW PR PBB SHADOW TECHNICAL ONLY FILED TO HB: Mod: PBBFAC,,,

## 2024-03-13 RX ADMIN — MEDROXYPROGESTERONE ACETATE 150 MG: 150 INJECTION, SUSPENSION INTRAMUSCULAR at 03:03

## 2024-03-22 ENCOUNTER — OFFICE VISIT (OUTPATIENT)
Dept: OBSTETRICS AND GYNECOLOGY | Facility: CLINIC | Age: 24
End: 2024-03-22
Payer: MEDICAID

## 2024-03-22 VITALS — DIASTOLIC BLOOD PRESSURE: 80 MMHG | BODY MASS INDEX: 24.6 KG/M2 | WEIGHT: 134.5 LBS | SYSTOLIC BLOOD PRESSURE: 110 MMHG

## 2024-03-22 DIAGNOSIS — N76.0 ACUTE VAGINITIS: Primary | ICD-10-CM

## 2024-03-22 PROCEDURE — 99999 PR PBB SHADOW E&M-EST. PATIENT-LVL I: CPT | Mod: PBBFAC,,, | Performed by: OBSTETRICS & GYNECOLOGY

## 2024-03-22 PROCEDURE — 87491 CHLMYD TRACH DNA AMP PROBE: CPT | Performed by: OBSTETRICS & GYNECOLOGY

## 2024-03-22 PROCEDURE — 99213 OFFICE O/P EST LOW 20 MIN: CPT | Mod: S$PBB,,, | Performed by: OBSTETRICS & GYNECOLOGY

## 2024-03-22 PROCEDURE — 3079F DIAST BP 80-89 MM HG: CPT | Mod: CPTII,,, | Performed by: OBSTETRICS & GYNECOLOGY

## 2024-03-22 PROCEDURE — 3074F SYST BP LT 130 MM HG: CPT | Mod: CPTII,,, | Performed by: OBSTETRICS & GYNECOLOGY

## 2024-03-22 PROCEDURE — 3046F HEMOGLOBIN A1C LEVEL >9.0%: CPT | Mod: CPTII,,, | Performed by: OBSTETRICS & GYNECOLOGY

## 2024-03-22 PROCEDURE — 99211 OFF/OP EST MAY X REQ PHY/QHP: CPT | Mod: PBBFAC | Performed by: OBSTETRICS & GYNECOLOGY

## 2024-03-22 PROCEDURE — 3008F BODY MASS INDEX DOCD: CPT | Mod: CPTII,,, | Performed by: OBSTETRICS & GYNECOLOGY

## 2024-03-22 PROCEDURE — 81514 NFCT DS BV&VAGINITIS DNA ALG: CPT | Performed by: OBSTETRICS & GYNECOLOGY

## 2024-03-22 NOTE — PROGRESS NOTES
SUBJECTIVE:   23 y.o. female   for vulvar irritation    No LMP recorded. Patient has had an injection..  .      Pt is a T1DM  She was admitted to hospital x 1 week in early Feb for vulvar infection  She was treated for a UTI, yeast infection and genital herpes  She was given a cream to go home  She is feeling much better now but still feel irritated in the vulvar  She does take bath frequently  Denies vaginal discharge      OB History    Para Term  AB Living   0 0 0 0 0     SAB IAB Ectopic Multiple Live Births   0 0 0       Obstetric Comments   Gynhx: reg/7   Sexually active @ age 20, one LTP     Past Medical History:   Diagnosis Date    Diabetes mellitus      No past surgical history on file.  Social History     Socioeconomic History    Marital status: Single   Tobacco Use    Smoking status: Passive Smoke Exposure - Never Smoker    Smokeless tobacco: Never   Substance and Sexual Activity    Alcohol use: No    Drug use: No    Sexual activity: Yes     Partners: Male   Social History Narrative    Lives at home with mom and mom's boyfriend. Has siblings that do not live with her, but they are healthy per report.    Graduating high school, going to [employer] for nursing in the Fall.     Social Determinants of Health     Financial Resource Strain: Low Risk  (2024)    Overall Financial Resource Strain (CARDIA)     Difficulty of Paying Living Expenses: Not hard at all   Food Insecurity: No Food Insecurity (2024)    Hunger Vital Sign     Worried About Running Out of Food in the Last Year: Never true     Ran Out of Food in the Last Year: Never true   Transportation Needs: No Transportation Needs (2024)    PRAPARE - Transportation     Lack of Transportation (Medical): No     Lack of Transportation (Non-Medical): No   Physical Activity: Inactive (2024)    Exercise Vital Sign     Days of Exercise per Week: 0 days     Minutes of Exercise per Session: 0 min   Stress: No Stress Concern  "Present (2/22/2024)    [place] [employer] - Occupational Stress Questionnaire     Feeling of Stress : Not at all   Social Connections: Unknown (2/22/2024)    Social Connection and Isolation Panel [NHANES]     Frequency of Communication with Friends and Family: Twice a week     Frequency of Social Gatherings with Friends and Family: Twice a week     Active Member of Clubs or Organizations: No     Attends Club or Organization Meetings: Patient declined     Marital Status: Never    Housing Stability: Low Risk  (2/22/2024)    Housing Stability Vital Sign     Unable to Pay for Housing in the Last Year: No     Number of Places Lived in the Last Year: 1     Unstable Housing in the Last Year: No     Family History   Problem Relation Age of Onset    Diabetes Mother     Stroke Mother     Heart attacks under age 50 Mother     Diabetes Maternal Grandfather     Diabetes Paternal Grandmother     Arrhythmia Neg Hx     Early death Neg Hx     Pacemaker/defibrilator Neg Hx     Congenital heart disease Neg Hx          Current Outpatient Medications   Medication Sig Dispense Refill    famotidine (PEPCID) 20 MG tablet Take 1 tablet (20 mg total) by mouth 2 (two) times daily. for 10 days 20 tablet 0    insulin aspart U-100 (NOVOLOG) 100 unit/mL (3 mL) InPn pen Inject 1-15 Units into the skin before meals and at bedtime as needed (Hyperglycemia). FSG , do nothing.151-200 give 3 units, 201-250 give 6 units, 251-300 give 9 units, 301-350 give 12 units, 351-400 give 15 units 15 mL 0    insulin detemir U-100, Levemir, 100 unit/mL (3 mL) SubQ InPn pen Inject 16 Units into the skin every evening.  0    insulin detemir U-100, Levemir, 100 unit/mL (3 mL) SubQ InPn pen Inject 28 Units into the skin once daily.  0    pen needle, diabetic 32 gauge x 5/32" Ndle USE TO INJECT INSULIN FOUR TIMES DAILY AS DIRECTED 100 each 0    valACYclovir (VALTREX) 1000 MG tablet Take 1 tablet (1,000 mg total) by mouth 2 (two) times " daily. for 7 days 14 tablet 0     No current facility-administered medications for this visit.     Allergies: Patient has no known allergies.       ROS:  GENERAL: Denies weight gain or weight loss. Feeling well overall.   SKIN: Denies rash or lesions.   HEAD: Denies head injury or headache.   NODES: Denies enlarged lymph nodes.   CHEST: Denies chest pain or shortness of breath.   CARDIOVASCULAR: Denies palpitations or left sided chest pain.   ABDOMEN: No abdominal pain, constipation, diarrhea, nausea, vomiting or rectal bleeding.   URINARY: No frequency, dysuria, hematuria, or burning on urination.  REPRODUCTIVE: see HPI  BREASTS: The patient performs breast self-examination and denies pain, lumps, or nipple discharge.   HEMATOLOGIC: No easy bruisability or excessive bleeding.  MUSCULOSKELETAL: Denies joint pain or swelling.   NEUROLOGIC: Denies syncope or weakness.   PSYCHIATRIC: Denies depression, anxiety or mood swings.    OBJECTIVE:   /80   Wt 61 kg (134 lb 7.7 oz)   BMI 24.60 kg/m²   The patient appears well, alert, oriented x 3, in no distress.  NECK: negative, no thyromegaly, trachea midline  SKIN: normal, good color, good turgor, and no acne, striae, hirsutism  BREAST EXAM: not examined  ABDOMEN: soft, non-tender; bowel sounds normal; no masses,  no organomegaly and no hernias, masses, or hepatosplenomegaly  BLADDER: soft  GENITALIA: marked erythema in the vulva area  URETHRA: normal appearing urethra with no masses, tenderness or lesions and normal urethra, normal urethral meatus  VAGINA: Normal besides non-odorous milky discharge  CERVIX: no lesions or cervical motion tenderness  UTERUS: normal size, contour, position, consistency, mobility, non-tender  ADNEXA: no mass, fullness, tenderness      ASSESSMENT:   .Rosemary Hernandez was seen today for vulvar itch.    Diagnoses and all orders for this visit:    Acute vaginitis  -     Vaginosis Screen by DNA Probe  -     C. trachomatis/N. gonorrhoeae by AMP DNA  [facility]; Cervicovaginal    Discussed vulvar hygienes: avoid irritant (including panty liners), use non-scented soap, avoid douching, wear loose fitting clothes, cotton panties, etc.   Advise no underwear at home and to bed. She can use vaseline for comfort  Will f/u with result

## 2024-03-23 LAB
C TRACH DNA SPEC QL NAA+PROBE: NOT DETECTED
N GONORRHOEA DNA SPEC QL NAA+PROBE: NOT DETECTED

## 2024-03-24 LAB
BACTERIAL VAGINOSIS DNA: NEGATIVE
CANDIDA GLABRATA DNA: NEGATIVE
CANDIDA KRUSEI DNA: NEGATIVE
CANDIDA RRNA VAG QL PROBE: POSITIVE
T VAGINALIS RRNA GENITAL QL PROBE: NEGATIVE

## 2024-03-25 RX ORDER — FLUCONAZOLE 150 MG/1
150 TABLET ORAL ONCE
Qty: 1 TABLET | Refills: 0 | Status: SHIPPED | OUTPATIENT
Start: 2024-03-25 | End: 2024-03-25

## 2024-04-01 LAB — ARUP MISCELLANEOUS TEST 1: NORMAL

## 2024-05-05 ENCOUNTER — HOSPITAL ENCOUNTER (EMERGENCY)
Facility: HOSPITAL | Age: 24
Discharge: HOME | End: 2024-05-05
Attending: STUDENT IN AN ORGANIZED HEALTH CARE EDUCATION/TRAINING PROGRAM
Payer: MEDICAID

## 2024-05-05 VITALS
WEIGHT: 150 LBS | SYSTOLIC BLOOD PRESSURE: 137 MMHG | BODY MASS INDEX: 27.6 KG/M2 | OXYGEN SATURATION: 100 % | DIASTOLIC BLOOD PRESSURE: 87 MMHG | HEIGHT: 62 IN | RESPIRATION RATE: 18 BRPM | HEART RATE: 93 BPM | TEMPERATURE: 99 F

## 2024-05-05 DIAGNOSIS — N89.8 VAGINAL IRRITATION: Primary | ICD-10-CM

## 2024-05-05 LAB
B-HCG UR QL: NEGATIVE
BACTERIA #/AREA URNS HPF: NORMAL /HPF
BACTERIA GENITAL QL WET PREP: ABNORMAL
BILIRUB UR QL STRIP: NEGATIVE
CLARITY UR: CLEAR
CLUE CELLS VAG QL WET PREP: ABNORMAL
COLOR UR: YELLOW
CTP QC/QA: YES
FILAMENT FUNGI VAG WET PREP-#/AREA: ABNORMAL
GLUCOSE UR QL STRIP: ABNORMAL
HGB UR QL STRIP: NEGATIVE
HYALINE CASTS #/AREA URNS LPF: 0 /LPF
KETONES UR QL STRIP: NEGATIVE
LEUKOCYTE ESTERASE UR QL STRIP: NEGATIVE
MICROSCOPIC COMMENT: NORMAL
NITRITE UR QL STRIP: NEGATIVE
PH UR STRIP: 6 [PH] (ref 5–8)
PROT UR QL STRIP: ABNORMAL
RBC #/AREA URNS HPF: 0 /HPF (ref 0–4)
SP GR UR STRIP: >1.03 (ref 1–1.03)
SPECIMEN SOURCE: ABNORMAL
T VAGINALIS GENITAL QL WET PREP: ABNORMAL
URN SPEC COLLECT METH UR: ABNORMAL
UROBILINOGEN UR STRIP-ACNC: NEGATIVE EU/DL
WBC #/AREA URNS HPF: 0 /HPF (ref 0–5)
WBC #/AREA VAG WET PREP: ABNORMAL
YEAST GENITAL QL WET PREP: ABNORMAL
YEAST URNS QL MICRO: NORMAL

## 2024-05-05 PROCEDURE — 81000 URINALYSIS NONAUTO W/SCOPE: CPT

## 2024-05-05 PROCEDURE — 87210 SMEAR WET MOUNT SALINE/INK: CPT

## 2024-05-05 PROCEDURE — 99283 EMERGENCY DEPT VISIT LOW MDM: CPT

## 2024-05-05 PROCEDURE — 87591 N.GONORRHOEAE DNA AMP PROB: CPT

## 2024-05-05 PROCEDURE — 81025 URINE PREGNANCY TEST: CPT | Performed by: STUDENT IN AN ORGANIZED HEALTH CARE EDUCATION/TRAINING PROGRAM

## 2024-05-05 PROCEDURE — 87491 CHLMYD TRACH DNA AMP PROBE: CPT

## 2024-05-05 RX ORDER — FLUCONAZOLE 150 MG/1
150 TABLET ORAL DAILY
Qty: 1 TABLET | Refills: 0 | Status: SHIPPED | OUTPATIENT
Start: 2024-05-05 | End: 2024-05-06

## 2024-05-05 NOTE — DISCHARGE INSTRUCTIONS
You were seen in the emergency department today for vaginal irritation and were diagnosed with likely yeast infection.  Please take Diflucan as prescribed and follow up with PCP..  It is important to remember that some problems are difficult to diagnose and may not be found during your Emergency Department visit. Be sure to follow up with your primary care doctor and review all labs/imaging/tests that were performed during this visit with them. Some labs/tests may be outside of the normal range and require non-emergent follow-up and further investigation to help diagnose/exclude/prevent complications or other medical conditions. Return to the emergency department for any new or worsening symptoms. Thank you for allowing me to care for you today, it was my pleasure. I hope you get to feeling better soon!

## 2024-05-05 NOTE — ED PROVIDER NOTES
"Encounter Date: 5/5/2024    SCRIBE #1 NOTE: I, Prerna Brody, am scribing for, and in the presence of,  Rigoberto Baumann PA-C. I have scribed the following portions of the note - Other sections scribed: HPI, ROS, PE.       History     Chief Complaint   Patient presents with    Vaginal Itching     Reports external itching and redness since yesterday. Denies vaginal discharge or dysuria. Using topical, unsure what.      Patient is a 23 y.o. female with a past medical history of DM on Insulin who presents to the Emergency Department for evaluation of vaginal redness and itching that started yesterday. She also reports associated symptoms of intermittent dysuria. She describes the pain as "irritation" and has not taken any medications for the symptoms. Patient denies any new partners or concerns for an STD. Her LMP was 05/3/24.  She denies genital lesions.  No history of sexually transmitted infections.  Reports her diabetes is not well controlled at this time.  Reports she is currently trying to get it under control with primary care.  Reports blood sugar at home is running in the low 200s.  She denies fever, nausea, vomiting, diarrhea, or vaginal discharge, pelvic pain, or vaginal lesions. NKDA.    The history is provided by the patient. No  was used.     Review of patient's allergies indicates:  No Known Allergies  Past Medical History:   Diagnosis Date    Diabetes mellitus      No past surgical history on file.  Family History   Problem Relation Name Age of Onset    Diabetes Mother      Stroke Mother      Heart attacks under age 50 Mother      Diabetes Maternal Grandfather      Diabetes Paternal Grandmother      Arrhythmia Neg Hx      Early death Neg Hx      Pacemaker/defibrilator Neg Hx      Congenital heart disease Neg Hx       Social History     Tobacco Use    Smoking status: Passive Smoke Exposure - Never Smoker    Smokeless tobacco: Never   Substance Use Topics    Alcohol use: No    Drug " use: No     Review of Systems   Constitutional:  Negative for chills and fever.   Respiratory:  Negative for shortness of breath.    Cardiovascular:  Negative for chest pain.   Gastrointestinal:  Negative for abdominal pain, diarrhea, nausea and vomiting.   Genitourinary:  Positive for dysuria and vaginal pain. Negative for decreased urine volume, frequency, hematuria, pelvic pain, urgency, vaginal bleeding and vaginal discharge.        (-) vaginal lesions   Musculoskeletal:  Negative for myalgias.   Skin:  Negative for rash. Color change: redness to the vagina.  Neurological:  Negative for dizziness, light-headedness, numbness and headaches.       Physical Exam     Initial Vitals [05/05/24 1357]   BP Pulse Resp Temp SpO2   137/87 93 18 98.6 °F (37 °C) 100 %      MAP       --         Physical Exam    Nursing note and vitals reviewed.  Constitutional: She appears well-developed and well-nourished.   HENT:   Head: Normocephalic and atraumatic.   Right Ear: External ear normal.   Left Ear: External ear normal.   Neck: Carotid bruit is not present.   Normal range of motion.  Cardiovascular:  Normal rate, regular rhythm, normal heart sounds and intact distal pulses.     Exam reveals no gallop and no friction rub.       No murmur heard.  Pulmonary/Chest: Breath sounds normal. No respiratory distress. She has no wheezes. She has no rhonchi. She has no rales.   Abdominal: Abdomen is soft. Bowel sounds are normal. She exhibits no distension. There is no abdominal tenderness. There is no rebound and no guarding.   Musculoskeletal:         General: Normal range of motion.      Cervical back: Normal range of motion.     Neurological: She is alert and oriented to person, place, and time. GCS score is 15. GCS eye subscore is 4. GCS verbal subscore is 5. GCS motor subscore is 6.   Psychiatric: She has a normal mood and affect.         ED Course   Procedures  Labs Reviewed   VAGINAL SCREEN - Abnormal; Notable for the following  components:       Result Value    WBC - Vaginal Screen Rare (*)     Bacteria - Vaginal Screen Rare (*)     All other components within normal limits    Narrative:     Self swab  Release to patient->Immediate   URINALYSIS, REFLEX TO URINE CULTURE - Abnormal; Notable for the following components:    Specific Gravity, UA >1.030 (*)     Protein, UA 1+ (*)     Glucose, UA 4+ (*)     All other components within normal limits    Narrative:     Specimen Source->Urine   URINALYSIS MICROSCOPIC    Narrative:     Specimen Source->Urine   C. TRACHOMATIS/N. GONORRHOEAE BY AMP DNA   POCT URINE PREGNANCY          Imaging Results    None          Medications - No data to display  Medical Decision Making  This is an emergent evaluation of a 23-year-old female with a past medical history of diabetes mellitus who presents to the emergency department for evaluation of vaginal redness and irritation, intermittent dysuria x 1 day.    Patient looks well clinically. Regular rate rhythm without murmurs.  No carotid bruits appreciated on exam. Lungs are clear to auscultation bilaterally.  Abdomen is soft, nontender, non distended, with normal bowel sounds.     Differential diagnosis includes but is not limited to vaginal candidiasis, UTI, STI.  Considered but doubt PID, TOA.    Workup initiated with urinalysis , GC, vaginal screen.  Vital signs, chart, labs, and/or imaging were all reviewed.  See ED course below and interpretations above. My overall impression is likely vaginal candidiasis due to uncontrolled diabetes. Will discharge home with PCP follow-up.  Also advised patient to discontinue using any fragrance body care products. Patient is very well appearing, and in no acute distress. Vital signs are reassuring here in the emergency department, patient is afebrile, breathing comfortable, satting 100 % on room air. Patient/Caregiver is stable for discharge at this time.  Patient/Caregiver was informed of results and plan of care.  Patient/Caregiver verbalized understanding of care plan. All questions and concerns were addressed. Discussed strict return precautions with the patient/caregiver. Instructed follow up with primary care provider within 1 week.      Rigoberto Baumann PA-C    DISCLAIMER: This note was prepared with [employer] voice recognition transcription software. Garbled syntax, mangled pronouns, and other bizarre constructions may be attributed to that software system.      Amount and/or Complexity of Data Reviewed  Labs: ordered. Decision-making details documented in ED Course.    Risk  Prescription drug management.            Scribe Attestation:   Scribe #1: I performed the above scribed service and the documentation accurately describes the services I performed. I attest to the accuracy of the note.        ED Course as of 05/05/24 1606   Sun May 05, 2024   1409 POCT urine pregnancy  UPT negative. [TM]   1429 Urinalysis, Reflex to Urine Culture Urine, Clean Catch(!)  Urinalysis showing no signs of infection, negative for nitrites or leukocyte, 4+ glucose, 1+ protein, elevated specific gravity. [TM]   1450 Vaginal Screen(!)  Vaginal screen negative for Trichomonas, clue cells, budding yeast, fungal hyphae.  However, patient has history is most correlated with vaginal candidiasis given history of diabetes.  We will discharge with Diflucan PCP follow-up. [TM]   1451 I have also instructed patient to refrain from using any fragrance body washes [TM]      ED Course User Index  [TM] Rigoberto Baumann PA-C                           Clinical Impression:  Final diagnoses:  [N89.8] Vaginal irritation (Primary)       IRigoberto PA-C, personally performed the services described in this documentation. All medical record entries made by the scribe were at my direction and in my presence. I have reviewed the chart and agree that the record reflects my personal performance and is accurate and complete.    ED Disposition Condition    Discharge  Stable          ED Prescriptions       Medication Sig Dispense Start Date End Date Auth. Provider    fluconazole (DIFLUCAN) 150 MG Tab Take 1 tablet (150 mg total) by mouth once daily. for 1 day 1 tablet 5/5/2024 5/6/2024 Rigoberto Baumann PA-C          Follow-up Information       Follow up With Specialties Details Why Contact Info    Marlys Carreon MD Family Medicine   [address] [facility] [address] [phone]      [facility] Emergency Medicine Go to  As needed, If symptoms worsen, or new symptoms develop [address] [facility] [place] [address] [phone]             Rigoberto Baumann PA-C  05/05/24 7162

## 2024-05-06 PROBLEM — N17.9 AKI (ACUTE KIDNEY INJURY): Status: RESOLVED | Noted: 2024-02-05 | Resolved: 2024-05-06

## 2024-05-06 PROBLEM — N39.0 UTI (URINARY TRACT INFECTION): Status: RESOLVED | Noted: 2024-02-02 | Resolved: 2024-05-06

## 2024-05-07 LAB
C TRACH DNA SPEC QL NAA+PROBE: NOT DETECTED
N GONORRHOEA DNA SPEC QL NAA+PROBE: NOT DETECTED

## 2024-05-09 ENCOUNTER — OFFICE VISIT (OUTPATIENT)
Dept: OBSTETRICS AND GYNECOLOGY | Facility: CLINIC | Age: 24
End: 2024-05-09
Payer: MEDICAID

## 2024-05-09 VITALS
BODY MASS INDEX: 24.73 KG/M2 | DIASTOLIC BLOOD PRESSURE: 68 MMHG | HEIGHT: 62 IN | SYSTOLIC BLOOD PRESSURE: 102 MMHG | WEIGHT: 134.38 LBS

## 2024-05-09 DIAGNOSIS — N76.0 ACUTE VAGINITIS: Primary | ICD-10-CM

## 2024-05-09 DIAGNOSIS — S30.817A PERIANAL EXCORIATION: ICD-10-CM

## 2024-05-09 PROCEDURE — 3008F BODY MASS INDEX DOCD: CPT | Mod: CPTII,,,

## 2024-05-09 PROCEDURE — 99213 OFFICE O/P EST LOW 20 MIN: CPT | Mod: PBBFAC

## 2024-05-09 PROCEDURE — 3046F HEMOGLOBIN A1C LEVEL >9.0%: CPT | Mod: CPTII,,,

## 2024-05-09 PROCEDURE — 3074F SYST BP LT 130 MM HG: CPT | Mod: CPTII,,,

## 2024-05-09 PROCEDURE — 1159F MED LIST DOCD IN RCRD: CPT | Mod: CPTII,,,

## 2024-05-09 PROCEDURE — 3078F DIAST BP <80 MM HG: CPT | Mod: CPTII,,,

## 2024-05-09 PROCEDURE — 87491 CHLMYD TRACH DNA AMP PROBE: CPT

## 2024-05-09 PROCEDURE — 81514 NFCT DS BV&VAGINITIS DNA ALG: CPT

## 2024-05-09 PROCEDURE — 99999 PR PBB SHADOW E&M-EST. PATIENT-LVL III: CPT | Mod: PBBFAC,,,

## 2024-05-09 PROCEDURE — 87591 N.GONORRHOEAE DNA AMP PROB: CPT

## 2024-05-09 PROCEDURE — 99213 OFFICE O/P EST LOW 20 MIN: CPT | Mod: S$PBB,,,

## 2024-05-09 RX ORDER — MUPIROCIN 20 MG/G
OINTMENT TOPICAL 3 TIMES DAILY
Qty: 15 G | Refills: 0 | Status: SHIPPED | OUTPATIENT
Start: 2024-05-09 | End: 2024-05-16

## 2024-05-09 RX ORDER — CLOTRIMAZOLE AND BETAMETHASONE DIPROPIONATE 10; .64 MG/G; MG/G
CREAM TOPICAL 2 TIMES DAILY
Qty: 15 G | Refills: 0 | Status: SHIPPED | OUTPATIENT
Start: 2024-05-09

## 2024-05-09 NOTE — PROGRESS NOTES
"Ms Rosemary Zee  is a 23 y.o.  female G 0 P 0 that presents with complaint of vaginal discharge, vaginal irritation,  vaginal itching for 4 days. She reports white discharge.   She reports itching, denies odor.   Pt state she had this same problem 2  months ago and was given Diflucan and clotrimazole-betamethasone crm. States she would like a refill on the cream. She reports h/o vaginitis.   Denies nausea, vomiting, diarrhea, constipation, dysuria, dyspareunia, abdominal pain. She would like STD screening at this visit. No other concerns or complaints at this visit.      Past Medical History:   Diagnosis Date    Diabetes mellitus      History reviewed. No pertinent surgical history.  Social History     Tobacco Use    Smoking status: Passive Smoke Exposure - Never Smoker    Smokeless tobacco: Never   Substance Use Topics    Alcohol use: No    Drug use: No     Family History   Problem Relation Name Age of Onset    Diabetes Mother      Stroke Mother      Heart attacks under age 50 Mother      Diabetes Maternal Grandfather      Diabetes Paternal Grandmother      Arrhythmia Neg Hx      Early death Neg Hx      Pacemaker/defibrilator Neg Hx      Congenital heart disease Neg Hx       OB History    Para Term  AB Living   0 0 0 0 0     SAB IAB Ectopic Multiple Live Births   0 0 0       Obstetric Comments   Gynhx: reg/7   Sexually active @ age 20, one LTP       /68 (BP Location: Left arm, Patient Position: Sitting)   Ht 5' 2" (1.575 m)   Wt 61 kg (134 lb 5.9 oz)   LMP 2024 (Approximate)   BMI 24.58 kg/m²     ROS:  GENERAL: No fever, chills, fatigability or weight loss.  VULVAR: No pain, no lesions and reports itching.  VAGINAL: No relaxation, no abnormal bleeding and no lesions. + discharge  ABDOMEN: No abdominal pain. Denies nausea. Denies vomiting. No diarrhea. No constipation  BREAST: Denies pain. No lumps. No discharge.  URINARY: No incontinence, no nocturia, no frequency and no " dysuria.  CARDIOVASCULAR: No chest pain. No shortness of breath. No leg cramps.  NEUROLOGICAL: No headaches. No vision changes.    PHYSICAL EXAM:  VULVA: +Areas of excoriation/irritation.  no masses, tenderness or lesions   VAGINA: normal appearing vagina with normal color. +thin white discharge, no lesions   CERVIX: normal appearing cervix without discharge or lesions   UTERUS: uterus is normal size, shape, consistency and nontender   ADNEXA: normal adnexa in size, nontender and no masses    ASSESSMENT and PLAN:    ICD-10-CM ICD-9-CM    1. Acute vaginitis  N76.0 616.10 Vaginosis Screen by DNA Probe      C. trachomatis/N. gonorrhoeae by AMP DNA      2. Perianal excoriation  S30.817A 911.8 clotrimazole-betamethasone 1-0.05% (LOTRISONE) cream      mupirocin (BACTROBAN) 2 % ointment          Impression: yeast vulvovaginitis, recurrent (complicated, more than four times a year) and vulvitis  Plan:   Prescriptions as noted in orders  Reviewed vulvar/vaginal care and hygiene  Return visit if symptoms persist or progress despite treatment  STD screening    Vaginitis Prevention:  - Avoiding feminine products such as deoderant soaps, body wash, bubble bath, douches, scented toilet paper, deoderant tampons or pads, feminine wipes, chronic pad use, etc. If you wash with soap make sure its hypo allergic (free of added scents or colors)   - Avoiding other vulvovaginal irritants such as long hot baths, humidity, tight, synthetic clothing, chlorine and sitting around in wet bathing suits  - Wearing cotton underwear, avoiding thong underwear and no underwear to bed-wear loose cotton bottoms to bed   - Taking showers instead of baths and use a hair dryer on cool setting afterwards to dry  - Wearing cotton to exercise and shower immediately after exercise and change clothes  - Using polyurethane condoms without spermicide if sexually active and symptoms are triggered by intercourse  - Use laundry detergent without added perfumes or  colors   - Do not have sexual intercourse until symptoms have gone away   - Increase your intake of probiotics--you can get these by eating yogurt/greek yogurt or by buying over the counter probiotic supplements     Probiotic Information:   Any probiotic you choose needs to have ALL of the following components (Each needs to be >10 colony forming units [CFUs]):   - L. Acidophilus  - L. Rhamnosus  - L. Fermentum       FOLLOW UP: PRN lack of improvement.    VAHE Hartman-BC

## 2024-05-11 LAB
C TRACH DNA SPEC QL NAA+PROBE: NOT DETECTED
N GONORRHOEA DNA SPEC QL NAA+PROBE: NOT DETECTED

## 2024-06-14 ENCOUNTER — TELEPHONE (OUTPATIENT)
Dept: OBSTETRICS AND GYNECOLOGY | Facility: CLINIC | Age: 24
End: 2024-06-14

## 2024-06-14 NOTE — TELEPHONE ENCOUNTER
Called the pt to confirm Rakita will be out of the office on 07/18/2024 and to offer r/s. No answer. LVM requesting a call back.

## 2024-07-08 ENCOUNTER — TELEPHONE (OUTPATIENT)
Dept: OBSTETRICS AND GYNECOLOGY | Facility: CLINIC | Age: 24
End: 2024-07-08
Payer: MEDICAID

## 2024-07-08 NOTE — TELEPHONE ENCOUNTER
Confirmed with the pt that Khadijah will be out of the office on 07/18/2024 and her [employer] needed to be rescheduled. Pt confirmed that she is currently without insurance and will need to R/S anyway. Confirmed appt to be on 09/13/2024 at 2:20PM. Confirmed with the pt that she can contact us sooner, if insurance situation changes.

## 2024-10-14 ENCOUNTER — OFFICE VISIT (OUTPATIENT)
Dept: OBSTETRICS AND GYNECOLOGY | Facility: CLINIC | Age: 24
End: 2024-10-14
Payer: COMMERCIAL

## 2024-10-14 ENCOUNTER — CLINICAL SUPPORT (OUTPATIENT)
Dept: OBSTETRICS AND GYNECOLOGY | Facility: CLINIC | Age: 24
End: 2024-10-14
Payer: COMMERCIAL

## 2024-10-14 VITALS — WEIGHT: 145.5 LBS | BODY MASS INDEX: 26.61 KG/M2

## 2024-10-14 DIAGNOSIS — Z12.39 SCREENING BREAST EXAMINATION: ICD-10-CM

## 2024-10-14 DIAGNOSIS — Z30.42 ENCOUNTER FOR MANAGEMENT AND INJECTION OF DEPO-PROVERA: Primary | ICD-10-CM

## 2024-10-14 DIAGNOSIS — Z01.419 ENCOUNTER FOR GYNECOLOGICAL EXAMINATION WITHOUT ABNORMAL FINDING: Primary | ICD-10-CM

## 2024-10-14 DIAGNOSIS — Z30.42 ENCOUNTER FOR MANAGEMENT AND INJECTION OF DEPO-PROVERA: ICD-10-CM

## 2024-10-14 LAB
B-HCG UR QL: NEGATIVE
CTP QC/QA: YES

## 2024-10-14 PROCEDURE — 96372 THER/PROPH/DIAG INJ SC/IM: CPT | Mod: S$GLB,,,

## 2024-10-14 PROCEDURE — 3008F BODY MASS INDEX DOCD: CPT | Mod: CPTII,S$GLB,,

## 2024-10-14 PROCEDURE — 1159F MED LIST DOCD IN RCRD: CPT | Mod: CPTII,S$GLB,,

## 2024-10-14 PROCEDURE — 0352U VAGINOSIS SCREEN BY DNA PROBE: CPT

## 2024-10-14 PROCEDURE — 3046F HEMOGLOBIN A1C LEVEL >9.0%: CPT | Mod: CPTII,S$GLB,,

## 2024-10-14 PROCEDURE — 87491 CHLMYD TRACH DNA AMP PROBE: CPT

## 2024-10-14 PROCEDURE — 81025 URINE PREGNANCY TEST: CPT | Mod: S$GLB,,,

## 2024-10-14 PROCEDURE — 99395 PREV VISIT EST AGE 18-39: CPT | Mod: 25,S$GLB,,

## 2024-10-14 PROCEDURE — 99999 PR PBB SHADOW E&M-EST. PATIENT-LVL II: CPT | Mod: PBBFAC,,,

## 2024-10-14 PROCEDURE — 99999 PR PBB SHADOW E&M-EST. PATIENT-LVL I: CPT | Mod: PBBFAC,,,

## 2024-10-14 PROCEDURE — 87591 N.GONORRHOEAE DNA AMP PROB: CPT

## 2024-10-14 RX ORDER — MEDROXYPROGESTERONE ACETATE 150 MG/ML
150 INJECTION, SUSPENSION INTRAMUSCULAR
Status: SHIPPED | OUTPATIENT
Start: 2024-10-14 | End: 2025-10-09

## 2024-10-14 RX ADMIN — MEDROXYPROGESTERONE ACETATE 150 MG: 150 INJECTION, SUSPENSION INTRAMUSCULAR at 09:10

## 2024-10-14 NOTE — PROGRESS NOTES
CC: Annual  HISTORY OF PRESENT ILLNESS:    Tristan Zee is a 24 y.o. female, , Patient's last menstrual period was 10/11/2024.,  presents for a routine exam and has no complaints.   She is sexually active. She uses condoms for contraception- wants to restart DEPO.  History of STDs: denies.  She does want STD screening.  Denies any other GYN complaints.      The patient participates in regular exercise: yes .  The patient does not smoke.  The patient wears seatbelts.   Pt denies any domestic violence. Reports tattoos or blood transfusions    SCREENING:   Pap: 2023  nilm  Gardasil Status: COMPLETE  Mammogram: N/A  Colonoscopy: N/A   DEXA:  N/A     GYN FH:   Breast cancer: none  Colon cancer: none  Ovarian cancer: none  Endometrial cancer: none     OB History    Para Term  AB Living   0 0 0 0 0     SAB IAB Ectopic Multiple Live Births   0 0 0       Obstetric Comments   Gynhx: reg/7   Sexually active @ age 20, one LTP        Past Medical History:  Past Medical History:   Diagnosis Date    Diabetes mellitus        Past Surgical History:  History reviewed. No pertinent surgical history.    Family History:  Family History   Problem Relation Name Age of Onset    Diabetes Mother      Stroke Mother      Heart attacks under age 50 Mother      Diabetes Maternal Grandfather      Diabetes Paternal Grandmother      Arrhythmia Neg Hx      Early death Neg Hx      Pacemaker/defibrilator Neg Hx      Congenital heart disease Neg Hx         Allergies:  Review of patient's allergies indicates:  No Known Allergies    Medications:  Current Outpatient Medications on File Prior to Visit   Medication Sig Dispense Refill    clotrimazole-betamethasone 1-0.05% (LOTRISONE) cream Apply topically 2 (two) times a day. 15 g 0    famotidine (PEPCID) 20 MG tablet Take 1 tablet (20 mg total) by mouth 2 (two) times daily. for 10 days 20 tablet 0    insulin aspart U-100 (NOVOLOG) 100 unit/mL (3 mL) InPn pen Inject 1-15 Units  "into the skin before meals and at bedtime as needed (Hyperglycemia). FSG , do nothing.151-200 give 3 units, 201-250 give 6 units, 251-300 give 9 units, 301-350 give 12 units, 351-400 give 15 units 15 mL 0    insulin detemir U-100, Levemir, 100 unit/mL (3 mL) SubQ InPn pen Inject 16 Units into the skin every evening.  0    insulin detemir U-100, Levemir, 100 unit/mL (3 mL) SubQ InPn pen Inject 28 Units into the skin once daily.  0    pen needle, diabetic 32 gauge x 5/32" Ndle USE TO INJECT INSULIN FOUR TIMES DAILY AS DIRECTED 100 each 0    valACYclovir (VALTREX) 1000 MG tablet Take 1 tablet (1,000 mg total) by mouth 2 (two) times daily. for 7 days 14 tablet 0     No current facility-administered medications on file prior to visit.       Social History:  Social History     Tobacco Use    Smoking status: Never     Passive exposure: Yes    Smokeless tobacco: Never   Substance Use Topics    Alcohol use: No    Drug use: No               ROS:   GENERAL: Feeling well overall. Denies fever or chills.   SKIN: Denies rash or lesions.   HEAD: Denies head injury or headache.   NODES: Denies enlarged lymph nodes.   CHEST: Denies chest pain or shortness of breath.   CARDIOVASCULAR: Denies palpitations or left sided chest pain.   ABDOMEN: No abdominal pain, constipation, diarrhea, nausea, vomiting or rectal bleeding.   URINARY: No dysuria, hematuria, or burning on urination.  REPRODUCTIVE: See HPI.   BREASTS: Denies pain, lumps, or nipple discharge.   HEMATOLOGIC: No easy bruisability or excessive bleeding.   MUSCULOSKELETAL: Denies joint pain or swelling.   NEUROLOGIC: Denies syncope or weakness.   PSYCHIATRIC: Denies depression, anxiety or mood swings.    PE:   APPEARANCE: Well nourished, well developed, Black or  female in no acute distress.  NODES: no cervical, supraclavicular, or inguinal lymphadenopathy  BREASTS: Symmetrical, no skin changes or visible lesions. No palpable masses, nipple discharge or " adenopathy bilaterally.  ABDOMEN: Soft. No tenderness or masses. No distention. No hernias palpated. No CVA tenderness.  VULVA: No lesions. Normal external female genitalia.  URETHRAL MEATUS: Normal size and location, no lesions, no prolapse.  URETHRA: No masses, tenderness, or prolapse.  VAGINA:  Moist. No lesions or lacerations noted. No abnormal discharge present. No odor present.   CERVIX: No lesions or discharge. No cervical motion tenderness.   UTERUS: Normal size, regular shape, mobile, non-tender.  ADNEXA: No tenderness. No fullness or masses palpated in the adnexal regions.   ANUS PERINEUM: Normal.      Diagnosis:  1. Encounter for gynecological examination without abnormal finding    2. Screening breast examination    3. Encounter for management and injection of depo-Provera        Plan:     Orders Placed This Encounter    POCT Urine Pregnancy     - Self breast exams encouraged  - Pap due in 2 years.  - Screening tests as ordered.  - Diet and exercise encouraged.  Condom use encouraged for STD prevention.  Seat belt use encouraged.  Reviewed ASCCP Pap guidelines and screening recommendations.  Calcium and vitamin D recommended.     Counseling: injury prevention: Driving under the influence of alcohol  Weapons  Seatbelts  Bicycle helmets  Adequate sleep  Exercise  Stress management techniques  indications for and frequency of periodic gynecologic exam  reviewed current Pap guidelines. Explained new understanding of natural history of cervical disease and improved Paps. Recommended guideline concordant care.    The patient was counseled today on ACS PAP guidelines, with recommendations for yearly pelvic exams unless their uterus, cervix, and ovaries were removed for benign reasons; in that case, examinations every 3-5 years are recommended.  The patient was also counseled regarding monthly breast self-examination, routine STD screening for at-risk populations, prophylactic immunizations for transmitted  infections such as  HPV, Pertussis, or Influenza as appropriate, and yearly mammograms when indicated by ACS guidelines.  She was advised to see her primary care physician for all other health maintenance.    Depo Provera injection today. Discusses possible irregular bleeding for first 3-6 months with injections. Pain, swelling and redness at injection site. Also discussed the possibility of irregular periods with injections or the possibility of amenorrhea. Patient understands that she must return to injection center every 3 months (90 days) for repeat injection.   Counseling session lasted approximately 10 minutes, and all her questions were answered.    Follow-up with me in 1 year for routine exam or sooner if needed.    Khadijah Kwon, VAHE-BC

## 2024-10-17 LAB
C TRACH DNA SPEC QL NAA+PROBE: NOT DETECTED
N GONORRHOEA DNA SPEC QL NAA+PROBE: NOT DETECTED

## 2024-10-18 LAB
BACTERIAL VAGINOSIS DNA: NOT DETECTED
CANDIDA GLABRATA/KRUSEI: DETECTED
CANDIDA RRNA VAG QL PROBE: DETECTED
TRICHOMONAS VAGINALIS: NOT DETECTED

## 2024-10-21 ENCOUNTER — PATIENT MESSAGE (OUTPATIENT)
Dept: OBSTETRICS AND GYNECOLOGY | Facility: CLINIC | Age: 24
End: 2024-10-21
Payer: COMMERCIAL

## 2024-10-21 DIAGNOSIS — B37.9 CANDIDA GLABRATA INFECTION: Primary | ICD-10-CM

## 2024-10-21 DIAGNOSIS — B37.9 YEAST INFECTION: ICD-10-CM

## 2024-10-21 DIAGNOSIS — B96.89 BV (BACTERIAL VAGINOSIS): ICD-10-CM

## 2024-10-21 DIAGNOSIS — B37.9 CANDIDA GLABRATA INFECTION: ICD-10-CM

## 2024-10-21 DIAGNOSIS — N76.0 BV (BACTERIAL VAGINOSIS): ICD-10-CM

## 2024-10-21 RX ORDER — FLUCONAZOLE 150 MG/1
150 TABLET ORAL
Qty: 2 TABLET | Refills: 0 | Status: SHIPPED | OUTPATIENT
Start: 2024-10-21

## 2024-11-27 ENCOUNTER — OFFICE VISIT (OUTPATIENT)
Dept: FAMILY MEDICINE | Facility: CLINIC | Age: 24
End: 2024-11-27
Payer: COMMERCIAL

## 2024-11-27 ENCOUNTER — CLINICAL SUPPORT (OUTPATIENT)
Dept: FAMILY MEDICINE | Facility: CLINIC | Age: 24
End: 2024-11-27
Attending: INTERNAL MEDICINE
Payer: COMMERCIAL

## 2024-11-27 VITALS
DIASTOLIC BLOOD PRESSURE: 84 MMHG | SYSTOLIC BLOOD PRESSURE: 112 MMHG | HEIGHT: 62 IN | TEMPERATURE: 98 F | WEIGHT: 141.75 LBS | OXYGEN SATURATION: 99 % | BODY MASS INDEX: 26.09 KG/M2 | HEART RATE: 88 BPM

## 2024-11-27 DIAGNOSIS — Z00.00 ANNUAL PHYSICAL EXAM: Primary | ICD-10-CM

## 2024-11-27 DIAGNOSIS — E10.3393 MODERATE NONPROLIFERATIVE DIABETIC RETINOPATHY OF BOTH EYES WITHOUT MACULAR EDEMA ASSOCIATED WITH TYPE 1 DIABETES MELLITUS: Primary | ICD-10-CM

## 2024-11-27 DIAGNOSIS — E10.65 TYPE 1 DIABETES MELLITUS WITH HYPERGLYCEMIA: ICD-10-CM

## 2024-11-27 DIAGNOSIS — Z11.59 NEED FOR HEPATITIS C SCREENING TEST: ICD-10-CM

## 2024-11-27 PROCEDURE — 92228 IMG RTA DETC/MNTR DS PHY/QHP: CPT | Mod: 26,S$GLB,, | Performed by: OPTOMETRIST

## 2024-11-27 PROCEDURE — 99999 PR PBB SHADOW E&M-EST. PATIENT-LVL IV: CPT | Mod: PBBFAC,,, | Performed by: INTERNAL MEDICINE

## 2024-11-27 PROCEDURE — 2024F 7 FLD RTA PHOTO EVC RTNOPTHY: CPT | Mod: CPTII,S$GLB,, | Performed by: OPTOMETRIST

## 2024-11-27 NOTE — PROGRESS NOTES
Keronnathaniel Zee is a 24 y.o. female here for a diabetic eye screening with non-dilated fundus photos per Dr. Last.    Patient cooperative?: Yes  Small pupils?: No  Last eye exam: 3/16/2020.    For exam results, see Encounter Report.

## 2024-11-27 NOTE — PROGRESS NOTES
SUBJECTIVE     Chief Complaint   Patient presents with    Diabetes       HPI  Maggylg Zee is a 24 y.o. female with multiple medical diagnoses as listed in the medical history and problem list that presents for annual exam. Pt has been doing well since her last visit. She has a good appetite and eats well. She does exercise in the gym 4 times per week. She sleeps for ~7 hours nightly. Pt does not take OTC supplements. She does not have any current stressors. Pt is UTD on age appropriate CA screening.    PAST MEDICAL HISTORY:  Past Medical History:   Diagnosis Date    Diabetes mellitus        PAST SURGICAL HISTORY:  History reviewed. No pertinent surgical history.    SOCIAL HISTORY:  Social History     Socioeconomic History    Marital status: Single   Tobacco Use    Smoking status: Never     Passive exposure: Yes    Smokeless tobacco: Never   Substance and Sexual Activity    Alcohol use: No    Drug use: No    Sexual activity: Yes     Partners: Male     Birth control/protection: Condom, Injection   Social History Narrative    Together since approx 1/2024    Lives at home with mom and stepfather.    Has siblings that do not live with her, but they are healthy per report.    Works as an associate at [employer]     Social Drivers of Health     Financial Resource Strain: Low Risk  (2/22/2024)    Overall Financial Resource Strain (CARDIA)     Difficulty of Paying Living Expenses: Not hard at all   Food Insecurity: No Food Insecurity (2/22/2024)    Hunger Vital Sign     Worried About Running Out of Food in the Last Year: Never true     Ran Out of Food in the Last Year: Never true   Transportation Needs: No Transportation Needs (2/22/2024)    PRAPARE - Transportation     Lack of Transportation (Medical): No     Lack of Transportation (Non-Medical): No   Physical Activity: Inactive (2/22/2024)    Exercise Vital Sign     Days of Exercise per Week: 0 days     Minutes of Exercise per Session: 0 min   Stress: No  "Stress Concern Present (2/22/2024)    [employer] [place] [employer] - Occupational Stress Questionnaire     Feeling of Stress : Not at all   Housing Stability: Low Risk  (2/22/2024)    Housing Stability Vital Sign     Unable to Pay for Housing in the Last Year: No     Number of Places Lived in the Last Year: 1     Unstable Housing in the Last Year: No       FAMILY HISTORY:  Family History   Problem Relation Name Age of Onset    Diabetes Mother Sharon Zee     Stroke Mother Sharon Zee     Heart attacks under age 50 Mother Sharon Zee     Diabetes Maternal Grandfather      Diabetes Paternal Grandmother      Diabetes Maternal Aunt Shiloh Reeves     Arrhythmia Neg Hx      Early death Neg Hx      Pacemaker/defibrilator Neg Hx      Congenital heart disease Neg Hx         ALLERGIES AND MEDICATIONS: updated and reviewed.  Review of patient's allergies indicates:  No Known Allergies  Current Outpatient Medications   Medication Sig Dispense Refill    insulin aspart U-100 (NOVOLOG) 100 unit/mL (3 mL) InPn pen Inject 1-15 Units into the skin before meals and at bedtime as needed (Hyperglycemia). FSG , do nothing.151-200 give 3 units, 201-250 give 6 units, 251-300 give 9 units, 301-350 give 12 units, 351-400 give 15 units 15 mL 0    insulin detemir U-100, Levemir, 100 unit/mL (3 mL) SubQ InPn pen Inject 32 Units into the skin once daily.  0    pen needle, diabetic 32 gauge x 5/32" Ndle USE TO INJECT INSULIN FOUR TIMES DAILY AS DIRECTED 100 each 0    valACYclovir (VALTREX) 1000 MG tablet Take 1 tablet (1,000 mg total) by mouth 2 (two) times daily. for 7 days 14 tablet 0     Current Facility-Administered Medications   Medication Dose Route Frequency Provider Last Rate Last Admin    medroxyPROGESTERone (DEPO-PROVERA) injection 150 mg  150 mg Intramuscular Q90 Days Khadijah Kwon NP   150 mg at 10/14/24 0944       ROS  Review of Systems   Constitutional:  Negative for chills and fever.   HENT:  Negative for " "hearing loss and sore throat.    Eyes:  Negative for visual disturbance.   Respiratory:  Negative for cough and shortness of breath.    Cardiovascular:  Negative for chest pain, palpitations and leg swelling.   Gastrointestinal:  Negative for abdominal pain, constipation, diarrhea, nausea and vomiting.   Genitourinary:  Negative for dysuria, frequency and urgency.   Musculoskeletal:  Negative for arthralgias, joint swelling and myalgias.   Skin:  Negative for rash and wound.   Neurological:  Negative for headaches.   Psychiatric/Behavioral:  Negative for agitation and confusion. The patient is not nervous/anxious.          OBJECTIVE     Physical Exam  Vitals:    11/27/24 0808   BP: 112/84   Pulse: 88   Temp: 97.9 °F (36.6 °C)    Body mass index is 25.93 kg/m².  Weight: 64.3 kg (141 lb 12.1 oz)   Height: 5' 2" (157.5 cm)     Physical Exam  Constitutional:       General: She is not in acute distress.     Appearance: She is well-developed.   HENT:      Head: Normocephalic and atraumatic.      Right Ear: Tympanic membrane, ear canal and external ear normal.      Left Ear: Tympanic membrane, ear canal and external ear normal.      Nose: Nose normal.   Eyes:      General: No scleral icterus.        Right eye: No discharge.         Left eye: No discharge.      Conjunctiva/sclera: Conjunctivae normal.   Neck:      Vascular: No JVD.      Trachea: No tracheal deviation.   Cardiovascular:      Rate and Rhythm: Normal rate and regular rhythm.      Heart sounds: No murmur heard.     No friction rub. No gallop.   Pulmonary:      Effort: Pulmonary effort is normal. No respiratory distress.      Breath sounds: Normal breath sounds. No wheezing.   Abdominal:      General: Bowel sounds are normal. There is no distension.      Palpations: Abdomen is soft. There is no mass.      Tenderness: There is no abdominal tenderness. There is no guarding or rebound.   Musculoskeletal:         General: No tenderness or deformity. Normal range of " motion.      Cervical back: Normal range of motion and neck supple.   Skin:     General: Skin is warm and dry.      Findings: No erythema or rash.   Neurological:      Mental Status: She is alert and oriented to person, place, and time.      Motor: No abnormal muscle tone.      Coordination: Coordination normal.   Psychiatric:         Behavior: Behavior normal.         Thought Content: Thought content normal.         Judgment: Judgment normal.           Health Maintenance         Date Due Completion Date    Hepatitis C Screening Never done ---    Pneumococcal Vaccines (Age 0-64) (1 of 2 - PCV) Never done ---    Diabetes Urine Screening 06/24/2021 6/24/2020    Foot Exam 06/24/2021 6/24/2020 (Done)    Override on 6/24/2020: Done (Exam documented in clinic note 6/24/2020)    Lipid Panel 06/24/2021 6/24/2020    Hemoglobin A1c 05/02/2024 2/2/2024    COVID-19 Vaccine (3 - 2024-25 season) 09/01/2024 5/4/2021    Chlamydia Screening 10/14/2025 10/14/2024    Eye Exam 11/27/2025 11/27/2024    Override on 3/16/2020: Done (Parental report, Dr. Nolasco in [place])    Pap Smear 06/14/2026 6/14/2023    TETANUS VACCINE 04/17/2034 4/17/2024    RSV Vaccine (Age 60+ and Pregnant patients) (1 - 1-dose 75+ series) 07/12/2077 ---              ASSESSMENT     24 y.o. female with     1. Annual physical exam    2. Type 1 diabetes mellitus with hyperglycemia    3. Need for hepatitis C screening test        PLAN:     1. Annual physical exam  - Counseled on age appropriate medical preventative services, including age appropriate cancer screenings, over all nutritional health, need for a consistent exercise regimen and an over all push towards maintaining a vigorous and active lifestyle.  Counseled on age appropriate vaccines and discussed upcoming health care needs based on age/gender.  Spent time with patient counseling on need for a good patient/doctor relationship moving forward.  Discussed use of common OTC medications and supplements.   Discussed common dietary aids and use of caffeine and the need for good sleep hygiene and stress management.  - CBC Auto Differential; Future  - Comprehensive Metabolic Panel; Future  - Hemoglobin A1C; Future  - TSH; Future  - Lipid Panel; Future    2. Type 1 diabetes mellitus with hyperglycemia  - Will increase Levemir from 30 units to 32 units since glucose in 200s persistently and continue SSI  - Diabetic Eye Screening Photo; Future  - Hemoglobin A1C; Future  - Microalbumin/Creatinine Ratio, Urine; Future  - Ambulatory referral/consult to Endocrinology; Future    3. Need for hepatitis C screening test  - HEPATITIS C ANTIBODY; Future        RTC in 3 months     Marge Last MD  11/27/2024 8:33 AM        No follow-ups on file.

## 2024-12-02 ENCOUNTER — TELEPHONE (OUTPATIENT)
Dept: FAMILY MEDICINE | Facility: CLINIC | Age: 24
End: 2024-12-02
Payer: COMMERCIAL

## 2024-12-02 DIAGNOSIS — E10.69 HYPERLIPIDEMIA DUE TO TYPE 1 DIABETES MELLITUS: ICD-10-CM

## 2024-12-02 DIAGNOSIS — E10.21: Primary | ICD-10-CM

## 2024-12-02 DIAGNOSIS — E78.5 HYPERLIPIDEMIA DUE TO TYPE 1 DIABETES MELLITUS: ICD-10-CM

## 2024-12-02 RX ORDER — PRAVASTATIN SODIUM 20 MG/1
20 TABLET ORAL DAILY
Qty: 90 TABLET | Refills: 3 | Status: SHIPPED | OUTPATIENT
Start: 2024-12-02 | End: 2025-12-02

## 2024-12-03 NOTE — TELEPHONE ENCOUNTER
Please call patient and help her get scheduled for a follow up appointment for DM1 in late December or early January.

## 2025-01-03 DIAGNOSIS — E10.21: Primary | ICD-10-CM

## 2025-01-06 ENCOUNTER — LAB VISIT (OUTPATIENT)
Dept: LAB | Facility: HOSPITAL | Age: 25
End: 2025-01-06
Attending: INTERNAL MEDICINE
Payer: COMMERCIAL

## 2025-01-06 ENCOUNTER — TELEPHONE (OUTPATIENT)
Dept: NEPHROLOGY | Facility: CLINIC | Age: 25
End: 2025-01-06
Payer: COMMERCIAL

## 2025-01-06 ENCOUNTER — CLINICAL SUPPORT (OUTPATIENT)
Dept: OBSTETRICS AND GYNECOLOGY | Facility: CLINIC | Age: 25
End: 2025-01-06
Payer: COMMERCIAL

## 2025-01-06 ENCOUNTER — OFFICE VISIT (OUTPATIENT)
Dept: FAMILY MEDICINE | Facility: CLINIC | Age: 25
End: 2025-01-06
Payer: COMMERCIAL

## 2025-01-06 VITALS — WEIGHT: 148.81 LBS | BODY MASS INDEX: 27.22 KG/M2

## 2025-01-06 VITALS
OXYGEN SATURATION: 98 % | TEMPERATURE: 97 F | SYSTOLIC BLOOD PRESSURE: 110 MMHG | DIASTOLIC BLOOD PRESSURE: 68 MMHG | HEIGHT: 62 IN | WEIGHT: 150.38 LBS | HEART RATE: 87 BPM | BODY MASS INDEX: 27.67 KG/M2

## 2025-01-06 DIAGNOSIS — E10.21: ICD-10-CM

## 2025-01-06 DIAGNOSIS — E10.21: Primary | ICD-10-CM

## 2025-01-06 DIAGNOSIS — Z30.42 ENCOUNTER FOR MANAGEMENT AND INJECTION OF DEPO-PROVERA: Primary | ICD-10-CM

## 2025-01-06 LAB
ALBUMIN SERPL BCP-MCNC: 3.8 G/DL (ref 3.5–5.2)
ANION GAP SERPL CALC-SCNC: 9 MMOL/L (ref 8–16)
BASOPHILS # BLD AUTO: 0.05 K/UL (ref 0–0.2)
BASOPHILS NFR BLD: 0.6 % (ref 0–1.9)
BUN SERPL-MCNC: 11 MG/DL (ref 6–20)
CALCIUM SERPL-MCNC: 9.5 MG/DL (ref 8.7–10.5)
CHLORIDE SERPL-SCNC: 102 MMOL/L (ref 95–110)
CO2 SERPL-SCNC: 20 MMOL/L (ref 23–29)
CREAT SERPL-MCNC: 1.2 MG/DL (ref 0.5–1.4)
DIFFERENTIAL METHOD BLD: NORMAL
EOSINOPHIL # BLD AUTO: 0.2 K/UL (ref 0–0.5)
EOSINOPHIL NFR BLD: 2.2 % (ref 0–8)
ERYTHROCYTE [DISTWIDTH] IN BLOOD BY AUTOMATED COUNT: 11.9 % (ref 11.5–14.5)
EST. GFR  (NO RACE VARIABLE): >60 ML/MIN/1.73 M^2
FERRITIN SERPL-MCNC: 159 NG/ML (ref 20–300)
GLUCOSE SERPL-MCNC: 503 MG/DL (ref 70–110)
HCT VFR BLD AUTO: 39 % (ref 37–48.5)
HGB BLD-MCNC: 12.7 G/DL (ref 12–16)
IMM GRANULOCYTES # BLD AUTO: 0.03 K/UL (ref 0–0.04)
IMM GRANULOCYTES NFR BLD AUTO: 0.4 % (ref 0–0.5)
IRON SERPL-MCNC: 91 UG/DL (ref 30–160)
LYMPHOCYTES # BLD AUTO: 2.3 K/UL (ref 1–4.8)
LYMPHOCYTES NFR BLD: 28.4 % (ref 18–48)
MCH RBC QN AUTO: 27 PG (ref 27–31)
MCHC RBC AUTO-ENTMCNC: 32.6 G/DL (ref 32–36)
MCV RBC AUTO: 83 FL (ref 82–98)
MONOCYTES # BLD AUTO: 0.5 K/UL (ref 0.3–1)
MONOCYTES NFR BLD: 6.4 % (ref 4–15)
NEUTROPHILS # BLD AUTO: 5 K/UL (ref 1.8–7.7)
NEUTROPHILS NFR BLD: 62 % (ref 38–73)
NRBC BLD-RTO: 0 /100 WBC
PHOSPHATE SERPL-MCNC: 2.9 MG/DL (ref 2.7–4.5)
PLATELET # BLD AUTO: 358 K/UL (ref 150–450)
PMV BLD AUTO: 11.8 FL (ref 9.2–12.9)
POTASSIUM SERPL-SCNC: 4.5 MMOL/L (ref 3.5–5.1)
PTH-INTACT SERPL-MCNC: 30.5 PG/ML (ref 9–77)
RBC # BLD AUTO: 4.7 M/UL (ref 4–5.4)
SATURATED IRON: 23 % (ref 20–50)
SODIUM SERPL-SCNC: 131 MMOL/L (ref 136–145)
TOTAL IRON BINDING CAPACITY: 403 UG/DL (ref 250–450)
TRANSFERRIN SERPL-MCNC: 272 MG/DL (ref 200–375)
URATE SERPL-MCNC: 3 MG/DL (ref 2.4–5.7)
WBC # BLD AUTO: 8.07 K/UL (ref 3.9–12.7)

## 2025-01-06 PROCEDURE — 85025 COMPLETE CBC W/AUTO DIFF WBC: CPT | Performed by: STUDENT IN AN ORGANIZED HEALTH CARE EDUCATION/TRAINING PROGRAM

## 2025-01-06 PROCEDURE — 84550 ASSAY OF BLOOD/URIC ACID: CPT | Performed by: STUDENT IN AN ORGANIZED HEALTH CARE EDUCATION/TRAINING PROGRAM

## 2025-01-06 PROCEDURE — 99999 PR PBB SHADOW E&M-EST. PATIENT-LVL III: CPT | Mod: PBBFAC,,, | Performed by: INTERNAL MEDICINE

## 2025-01-06 PROCEDURE — 80069 RENAL FUNCTION PANEL: CPT | Performed by: STUDENT IN AN ORGANIZED HEALTH CARE EDUCATION/TRAINING PROGRAM

## 2025-01-06 PROCEDURE — 96372 THER/PROPH/DIAG INJ SC/IM: CPT | Mod: S$GLB,,,

## 2025-01-06 PROCEDURE — 3074F SYST BP LT 130 MM HG: CPT | Mod: CPTII,S$GLB,, | Performed by: INTERNAL MEDICINE

## 2025-01-06 PROCEDURE — G2211 COMPLEX E/M VISIT ADD ON: HCPCS | Mod: S$GLB,,, | Performed by: INTERNAL MEDICINE

## 2025-01-06 PROCEDURE — 99214 OFFICE O/P EST MOD 30 MIN: CPT | Mod: S$GLB,,, | Performed by: INTERNAL MEDICINE

## 2025-01-06 PROCEDURE — 82728 ASSAY OF FERRITIN: CPT | Performed by: STUDENT IN AN ORGANIZED HEALTH CARE EDUCATION/TRAINING PROGRAM

## 2025-01-06 PROCEDURE — 99999 PR PBB SHADOW E&M-EST. PATIENT-LVL II: CPT | Mod: PBBFAC,,,

## 2025-01-06 PROCEDURE — 3078F DIAST BP <80 MM HG: CPT | Mod: CPTII,S$GLB,, | Performed by: INTERNAL MEDICINE

## 2025-01-06 PROCEDURE — 82306 VITAMIN D 25 HYDROXY: CPT | Performed by: STUDENT IN AN ORGANIZED HEALTH CARE EDUCATION/TRAINING PROGRAM

## 2025-01-06 PROCEDURE — 83036 HEMOGLOBIN GLYCOSYLATED A1C: CPT | Performed by: INTERNAL MEDICINE

## 2025-01-06 PROCEDURE — 1160F RVW MEDS BY RX/DR IN RCRD: CPT | Mod: CPTII,S$GLB,, | Performed by: INTERNAL MEDICINE

## 2025-01-06 PROCEDURE — 3008F BODY MASS INDEX DOCD: CPT | Mod: CPTII,S$GLB,, | Performed by: INTERNAL MEDICINE

## 2025-01-06 PROCEDURE — 83970 ASSAY OF PARATHORMONE: CPT | Performed by: STUDENT IN AN ORGANIZED HEALTH CARE EDUCATION/TRAINING PROGRAM

## 2025-01-06 PROCEDURE — 83540 ASSAY OF IRON: CPT | Performed by: STUDENT IN AN ORGANIZED HEALTH CARE EDUCATION/TRAINING PROGRAM

## 2025-01-06 PROCEDURE — 36415 COLL VENOUS BLD VENIPUNCTURE: CPT | Mod: PO | Performed by: STUDENT IN AN ORGANIZED HEALTH CARE EDUCATION/TRAINING PROGRAM

## 2025-01-06 PROCEDURE — 1159F MED LIST DOCD IN RCRD: CPT | Mod: CPTII,S$GLB,, | Performed by: INTERNAL MEDICINE

## 2025-01-06 RX ORDER — INSULIN GLARGINE 100 [IU]/ML
50 INJECTION, SOLUTION SUBCUTANEOUS NIGHTLY
Qty: 15 ML | Refills: 0 | Status: SHIPPED | OUTPATIENT
Start: 2025-01-06 | End: 2026-01-06

## 2025-01-06 RX ORDER — INSULIN GLARGINE 100 [IU]/ML
40 INJECTION, SOLUTION SUBCUTANEOUS NIGHTLY
Qty: 15 ML | Refills: 0 | Status: SHIPPED | OUTPATIENT
Start: 2025-01-06 | End: 2025-01-06 | Stop reason: SDUPTHER

## 2025-01-06 RX ADMIN — MEDROXYPROGESTERONE ACETATE 150 MG: 150 INJECTION, SUSPENSION INTRAMUSCULAR at 10:01

## 2025-01-06 NOTE — TELEPHONE ENCOUNTER
Critical lab was called in for this patient with a glucose level of 503. Secure chat message sent over to Dr. Mccauley, who attempted calling her and received the voicemail. I have also reached out to the patient in reference of this and left a voicemail for the patient. Patient has no appointment set up with Endo, and hasn't been seen in 5 years. Will sent this message over to PCP's staff as well.

## 2025-01-06 NOTE — PROGRESS NOTES
SUBJECTIVE     Chief Complaint   Patient presents with    Follow-up       HPI  Sd Zee is a 24 y.o. female with multiple medical diagnoses as listed in the medical history and problem list that presents for evaluation for DM2. Pt has been doing  well  since last visit. she is fully compliant with meds and denies any adverse side effects. Pt is  non-compliant  with an ADA diet and does exercise in the gym 4 times weekly. Pt does check her blood sugar levels at home with fasting readings ranging from  200s  and random blood glucose levels ranging from  200s . Pt denies any hypoglycemia since last visit. Pt presents for med refills today and is without any other complaints.     PAST MEDICAL HISTORY:  Past Medical History:   Diagnosis Date    Diabetes mellitus        PAST SURGICAL HISTORY:  History reviewed. No pertinent surgical history.    SOCIAL HISTORY:  Social History     Socioeconomic History    Marital status: Single   Tobacco Use    Smoking status: Never     Passive exposure: Yes    Smokeless tobacco: Never   Substance and Sexual Activity    Alcohol use: No    Drug use: No    Sexual activity: Yes     Partners: Male     Birth control/protection: Condom, Injection   Social History Narrative    Together since approx 1/2024    Lives at home with mom and stepfather.    Has siblings that do not live with her, but they are healthy per report.    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     Social Drivers of Health     Financial Resource Strain: Low Risk  (2/22/2024)    Overall Financial Resource Strain (CARDIA)     Difficulty of Paying Living Expenses: Not hard at all   Food Insecurity: No Food Insecurity (2/22/2024)    Hunger Vital Sign     Worried About Running Out of Food in the Last Year: Never true     Ran Out of Food in the Last Year: Never true   Transportation Needs: No Transportation Needs (2/22/2024)    PRAPARE - Transportation     Lack of Transportation (Medical): No     Lack of  "Transportation (Non-Medical): No   Physical Activity: Inactive (2/22/2024)    Exercise Vital Sign     Days of Exercise per Week: 0 days     Minutes of Exercise per Session: 0 min   Stress: No Stress Concern Present (2/22/2024)    [employer] [place] [employer] - Occupational Stress Questionnaire     Feeling of Stress : Not at all   Housing Stability: Low Risk  (2/22/2024)    Housing Stability Vital Sign     Unable to Pay for Housing in the Last Year: No     Number of Places Lived in the Last Year: 1     Unstable Housing in the Last Year: No       FAMILY HISTORY:  Family History   Problem Relation Name Age of Onset    Diabetes Mother Sharon Zee     Stroke Mother Sharon Zee     Heart attacks under age 50 Mother Sharon Zee     Diabetes Maternal Grandfather      Diabetes Paternal Grandmother      Diabetes Maternal Aunt Shiloh Reeves     Arrhythmia Neg Hx      Early death Neg Hx      Pacemaker/defibrilator Neg Hx      Congenital heart disease Neg Hx         ALLERGIES AND MEDICATIONS: updated and reviewed.  Review of patient's allergies indicates:  No Known Allergies  Current Outpatient Medications   Medication Sig Dispense Refill    insulin aspart U-100 (NOVOLOG) 100 unit/mL (3 mL) InPn pen Inject 1-15 Units into the skin before meals and at bedtime as needed (Hyperglycemia). FSG , do nothing.151-200 give 3 units, 201-250 give 6 units, 251-300 give 9 units, 301-350 give 12 units, 351-400 give 15 units 15 mL 0    pen needle, diabetic 32 gauge x 5/32" Ndle USE TO INJECT INSULIN FOUR TIMES DAILY AS DIRECTED 100 each 0    pravastatin (PRAVACHOL) 20 MG tablet Take 1 tablet (20 mg total) by mouth once daily. 90 tablet 3    insulin glargine U-100, Lantus, (LANTUS SOLOSTAR U-100 INSULIN) 100 unit/mL (3 mL) InPn pen Inject 40 Units into the skin every evening. 15 mL 0     Current Facility-Administered Medications   Medication Dose Route Frequency Provider Last Rate Last Admin    medroxyPROGESTERone " "(DEPO-PROVERA) injection 150 mg  150 mg Intramuscular Q90 Days Khadijah Kwon NP   150 mg at 01/06/25 1022       ROS  Review of Systems   Constitutional:  Negative for chills and fever.   HENT:  Negative for hearing loss and sore throat.    Eyes:  Negative for visual disturbance.   Respiratory:  Negative for cough and shortness of breath.    Cardiovascular:  Negative for chest pain, palpitations and leg swelling.   Gastrointestinal:  Negative for abdominal pain, constipation, diarrhea, nausea and vomiting.   Genitourinary:  Negative for dysuria, frequency and urgency.   Musculoskeletal:  Negative for arthralgias, joint swelling and myalgias.   Skin:  Negative for rash and wound.   Neurological:  Negative for headaches.   Psychiatric/Behavioral:  Negative for agitation and confusion. The patient is not nervous/anxious.          OBJECTIVE     Physical Exam  Vitals:    01/06/25 1138   BP: 110/68   Pulse: 87   Temp: 97.4 °F (36.3 °C)    Body mass index is 27.5 kg/m².  Weight: 68.2 kg (150 lb 5.7 oz)   Height: 5' 2" (157.5 cm)     Physical Exam  Constitutional:       General: She is not in acute distress.     Appearance: She is well-developed.   HENT:      Head: Normocephalic and atraumatic.      Right Ear: External ear normal.      Left Ear: External ear normal.      Nose: Nose normal.   Eyes:      General: No scleral icterus.        Right eye: No discharge.         Left eye: No discharge.      Conjunctiva/sclera: Conjunctivae normal.   Neck:      Vascular: No JVD.      Trachea: No tracheal deviation.   Pulmonary:      Effort: Pulmonary effort is normal. No respiratory distress.   Musculoskeletal:         General: No deformity. Normal range of motion.      Cervical back: Normal range of motion and neck supple.   Skin:     General: Skin is dry.      Findings: No erythema or rash.   Neurological:      Mental Status: She is alert and oriented to person, place, and time.      Motor: No abnormal muscle tone.      " Coordination: Coordination normal.   Psychiatric:         Behavior: Behavior normal.         Thought Content: Thought content normal.         Judgment: Judgment normal.           Health Maintenance         Date Due Completion Date    Foot Exam 06/24/2021 6/24/2020 (Done)    Override on 6/24/2020: Done (Exam documented in clinic note 6/24/2020)    Pneumococcal Vaccines (Age 0-49) (1 of 2 - PCV) Never done ---    COVID-19 Vaccine (3 - 2024-25 season) 09/01/2024 5/4/2021    Hemoglobin A1c 02/27/2025 11/27/2024    Chlamydia Screening 10/14/2025 10/14/2024    Diabetes Urine Screening 11/27/2025 11/27/2024    Lipid Panel 11/27/2025 11/27/2024    Eye Exam 11/27/2025 11/27/2024    Override on 3/16/2020: Done (Parental report, Dr. Nolasco in [place])    Pap Smear 06/14/2026 6/14/2023    TETANUS VACCINE 04/17/2034 4/17/2024    RSV Vaccine (Age 60+ and Pregnant patients) (1 - 1-dose 75+ series) 07/12/2077 ---              ASSESSMENT     24 y.o. female with     1. 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  Dispense: 15 mL; Refill: 0  - Hemoglobin A1C; Future            RTC in 6 months     Marge Last MD  01/06/2025 1:22 PM        No follow-ups on file.

## 2025-01-06 NOTE — TELEPHONE ENCOUNTER
Called patient to discuss her critical lab value, but she did not answer.  I left a message instructing her to go straight to the ER for an abnormal lab value.  I also informed her she can call the clinic for any further questions/concerns.

## 2025-01-07 LAB
25(OH)D3+25(OH)D2 SERPL-MCNC: 7 NG/ML (ref 30–96)
ESTIMATED AVG GLUCOSE: ABNORMAL MG/DL (ref 68–131)
HBA1C MFR BLD: >14 % (ref 4–5.6)

## 2025-01-13 ENCOUNTER — OFFICE VISIT (OUTPATIENT)
Dept: NEPHROLOGY | Facility: CLINIC | Age: 25
End: 2025-01-13
Payer: COMMERCIAL

## 2025-01-13 VITALS
RESPIRATION RATE: 18 BRPM | OXYGEN SATURATION: 97 % | WEIGHT: 150.81 LBS | BODY MASS INDEX: 27.75 KG/M2 | HEIGHT: 62 IN | HEART RATE: 95 BPM | DIASTOLIC BLOOD PRESSURE: 72 MMHG | SYSTOLIC BLOOD PRESSURE: 114 MMHG

## 2025-01-13 DIAGNOSIS — E10.21: ICD-10-CM

## 2025-01-13 DIAGNOSIS — N18.31 STAGE 3A CHRONIC KIDNEY DISEASE: Primary | ICD-10-CM

## 2025-01-13 PROCEDURE — 3078F DIAST BP <80 MM HG: CPT | Mod: CPTII,S$GLB,, | Performed by: STUDENT IN AN ORGANIZED HEALTH CARE EDUCATION/TRAINING PROGRAM

## 2025-01-13 PROCEDURE — 99999 PR PBB SHADOW E&M-EST. PATIENT-LVL IV: CPT | Mod: PBBFAC,,, | Performed by: STUDENT IN AN ORGANIZED HEALTH CARE EDUCATION/TRAINING PROGRAM

## 2025-01-13 PROCEDURE — 3066F NEPHROPATHY DOC TX: CPT | Mod: CPTII,S$GLB,, | Performed by: STUDENT IN AN ORGANIZED HEALTH CARE EDUCATION/TRAINING PROGRAM

## 2025-01-13 PROCEDURE — 99213 OFFICE O/P EST LOW 20 MIN: CPT | Mod: S$GLB,,, | Performed by: STUDENT IN AN ORGANIZED HEALTH CARE EDUCATION/TRAINING PROGRAM

## 2025-01-13 PROCEDURE — 3008F BODY MASS INDEX DOCD: CPT | Mod: CPTII,S$GLB,, | Performed by: STUDENT IN AN ORGANIZED HEALTH CARE EDUCATION/TRAINING PROGRAM

## 2025-01-13 PROCEDURE — 3046F HEMOGLOBIN A1C LEVEL >9.0%: CPT | Mod: CPTII,S$GLB,, | Performed by: STUDENT IN AN ORGANIZED HEALTH CARE EDUCATION/TRAINING PROGRAM

## 2025-01-13 PROCEDURE — 3074F SYST BP LT 130 MM HG: CPT | Mod: CPTII,S$GLB,, | Performed by: STUDENT IN AN ORGANIZED HEALTH CARE EDUCATION/TRAINING PROGRAM

## 2025-01-13 PROCEDURE — 1159F MED LIST DOCD IN RCRD: CPT | Mod: CPTII,S$GLB,, | Performed by: STUDENT IN AN ORGANIZED HEALTH CARE EDUCATION/TRAINING PROGRAM

## 2025-01-13 PROCEDURE — 3060F POS MICROALBUMINURIA REV: CPT | Mod: CPTII,S$GLB,, | Performed by: STUDENT IN AN ORGANIZED HEALTH CARE EDUCATION/TRAINING PROGRAM

## 2025-01-13 RX ORDER — LOSARTAN POTASSIUM 25 MG/1
12.5 TABLET ORAL DAILY
Qty: 45 TABLET | Refills: 3 | Status: SHIPPED | OUTPATIENT
Start: 2025-01-13 | End: 2026-01-13

## 2025-01-13 NOTE — PROGRESS NOTES
Subjective     Chief Complaint: CKD management    History of Present Illness:  Ms. Sd Zee is a 24 y.o. female with active medical diagnosis of DM    Labs resulted 1/6 with blood glucose of 503. Attempted to call patient and was met with voice mail. Messaged PCP as well.    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     #CKD2/3a  Baseline creatinine 1.0-1.2  UA 4+ glucose  UPCR 0.38    Creatinine   Date Value Ref Range Status   01/06/2025 1.2 0.5 - 1.4 mg/dL Final   11/27/2024 1.0 0.5 - 1.4 mg/dL Final   02/05/2024 2.0 (H) 0.5 - 1.4 mg/dL Final     eGFR   Date Value Ref Range Status   01/06/2025 >60 >60 mL/min/1.73 m^2 Final   11/27/2024 >60 >60 mL/min/1.73 m^2 Final   02/05/2024 36 (A) >60 mL/min/1.73 m^2 Final     Prot/Creat Ratio, Urine   Date Value Ref Range Status   01/06/2025 0.38 (H) 0.00 - 0.20 Final     #DM: she has a referral to see endocrine in April, reports that his AM her sugars were in the 300s and checks 4 times a day.     Hemoglobin A1c:>14  Hemoglobin A1C   Date Value Ref Range Status   01/06/2025 >14.0 (H) 4.0 - 5.6 % Final     Comment:     ADA Screening Guidelines:  5.7-6.4%  Consistent with prediabetes  >or=6.5%  Consistent with diabetes    High levels of fetal hemoglobin interfere with the HbA1C  assay. Heterozygous hemoglobin variants (HbS, HgC, etc)do  not significantly interfere with this assay.   However, presence of multiple variants may affect accuracy.     11/27/2024 >14.0 (H) 4.0 - 5.6 % Final     Comment:     ADA Screening Guidelines:  5.7-6.4%  Consistent with prediabetes  >or=6.5%  Consistent with diabetes    High levels of fetal hemoglobin interfere with the HbA1C  assay. Heterozygous hemoglobin variants (HbS, HgC, etc)do  not significantly interfere with this assay.   However, presence of multiple variants may affect accuracy.     02/02/2024 >14.0 (H)  4.0 - 5.6 % Final     Comment:     ADA Screening Guidelines:  5.7-6.4%  Consistent with prediabetes  >or=6.5%  Consistent with diabetes    High levels of fetal hemoglobin interfere with the HbA1C  assay. Heterozygous hemoglobin variants (HbS, HgC, etc)do  not significantly interfere with this assay.   However, presence of multiple variants may affect accuracy.         Review of Systems   Constitutional:  Negative for chills and fever.   HENT:  Negative for ear discharge and ear pain.    Eyes:  Negative for blurred vision and double vision.   Respiratory:  Negative for cough and shortness of breath.    Cardiovascular:  Negative for chest pain and palpitations.   Gastrointestinal:  Negative for abdominal pain, constipation, diarrhea, nausea and vomiting.   Genitourinary:  Negative for dysuria and frequency.   Musculoskeletal:  Negative for myalgias and neck pain.   Skin:  Negative for itching and rash.   Neurological:  Negative for tingling and headaches.             PAST HISTORY:     Past Medical History:   Diagnosis Date    Diabetes mellitus        No past surgical history on file.    Family History   Problem Relation Name Age of Onset    Diabetes Mother Sharon Zee     Stroke Mother Sharon Zee     Heart attacks under age 50 Mother Sharon Zee     Diabetes Maternal Grandfather      Diabetes Paternal Grandmother      Diabetes Maternal Aunt Shiloh Reeves     Arrhythmia Neg Hx      Early death Neg Hx      Pacemaker/defibrilator Neg Hx      Congenital heart disease Neg Hx         Social History     Socioeconomic History    Marital status: Single   Tobacco Use    Smoking status: Never     Passive exposure: Yes    Smokeless tobacco: Never   Substance and Sexual Activity    Alcohol use: No    Drug use: No    Sexual activity: Yes     Partners: Male     Birth control/protection: Condom, Injection   Social History Narrative    Together since approx 1/2024    Lives at home with mom and stepfather.    Has siblings that  "do not live with her, but they are healthy per report.    Works as an associate at [employer]     Social Drivers of Health     Financial Resource Strain: Low Risk  (2/22/2024)    Overall Financial Resource Strain (CARDIA)     Difficulty of Paying Living Expenses: Not hard at all   Food Insecurity: No Food Insecurity (2/22/2024)    Hunger Vital Sign     Worried About Running Out of Food in the Last Year: Never true     Ran Out of Food in the Last Year: Never true   Transportation Needs: No Transportation Needs (2/22/2024)    PRAPARE - Transportation     Lack of Transportation (Medical): No     Lack of Transportation (Non-Medical): No   Physical Activity: Inactive (2/22/2024)    Exercise Vital Sign     Days of Exercise per Week: 0 days     Minutes of Exercise per Session: 0 min   Stress: No Stress Concern Present (2/22/2024)    [employer] - Occupational Stress Questionnaire     Feeling of Stress : Not at all   Housing Stability: Low Risk  (2/22/2024)    Housing Stability Vital Sign     Unable to Pay for Housing in the Last Year: No     Number of Places Lived in the Last Year: 1     Unstable Housing in the Last Year: No       MEDICATIONS & ALLERGIES:     Current Outpatient Medications on File Prior to Visit   Medication Sig    insulin aspart U-100 (NOVOLOG) 100 unit/mL (3 mL) InPn pen Inject 1-15 Units into the skin before meals and at bedtime as needed (Hyperglycemia). FSG , do nothing.151-200 give 3 units, 201-250 give 6 units, 251-300 give 9 units, 301-350 give 12 units, 351-400 give 15 units    insulin glargine U-100, Lantus, (LANTUS SOLOSTAR U-100 INSULIN) 100 unit/mL (3 mL) InPn pen Inject 50 Units into the skin every evening.    pen needle, diabetic 32 gauge x 5/32" Ndle USE TO INJECT INSULIN FOUR TIMES DAILY AS DIRECTED    pravastatin (PRAVACHOL) 20 MG tablet Take 1 tablet (20 mg total) by mouth once daily.     Current Facility-Administered Medications on File " Prior to Visit   Medication    medroxyPROGESTERone (DEPO-PROVERA) injection 150 mg       Review of patient's allergies indicates:  No Known Allergies    OBJECTIVE:     Vital Signs:  There were no vitals filed for this visit.    There is no height or weight on file to calculate BMI.     Physical Exam  Constitutional:       General: She is not in acute distress.     Appearance: Normal appearance. She is not ill-appearing or diaphoretic.   HENT:      Head: Normocephalic and atraumatic.      Mouth/Throat:      Pharynx: No oropharyngeal exudate or posterior oropharyngeal erythema.   Eyes:      General: No scleral icterus.        Right eye: No discharge.         Left eye: No discharge.      Extraocular Movements: Extraocular movements intact.      Conjunctiva/sclera: Conjunctivae normal.      Pupils: Pupils are equal, round, and reactive to light.   Neck:      Vascular: No JVD.      Trachea: No tracheal deviation.   Cardiovascular:      Rate and Rhythm: Normal rate and regular rhythm.      Heart sounds: No murmur heard.  Pulmonary:      Effort: Pulmonary effort is normal. No respiratory distress.      Breath sounds: Normal breath sounds. No wheezing or rales.   Abdominal:      General: Abdomen is flat. Bowel sounds are normal. There is no distension.      Palpations: Abdomen is soft. There is no mass.      Tenderness: There is no abdominal tenderness.   Musculoskeletal:         General: No swelling, tenderness or deformity. Normal range of motion.      Cervical back: Normal range of motion and neck supple.      Right lower leg: No edema.      Left lower leg: No edema.   Lymphadenopathy:      Cervical: No cervical adenopathy.   Skin:     General: Skin is warm and dry.      Coloration: Skin is not jaundiced or pale.      Findings: No bruising, erythema or rash.   Neurological:      General: No focal deficit present.      Mental Status: She is alert and oriented to person, place, and time. Mental status is at baseline.       Cranial Nerves: No cranial nerve deficit.         Laboratory  Sodium   Date Value Ref Range Status   01/06/2025 131 (L) 136 - 145 mmol/L Final   11/27/2024 135 (L) 136 - 145 mmol/L Final   02/05/2024 141 136 - 145 mmol/L Final     Potassium   Date Value Ref Range Status   01/06/2025 4.5 3.5 - 5.1 mmol/L Final   11/27/2024 4.0 3.5 - 5.1 mmol/L Final   02/05/2024 3.6 3.5 - 5.1 mmol/L Final     Chloride   Date Value Ref Range Status   01/06/2025 102 95 - 110 mmol/L Final   11/27/2024 103 95 - 110 mmol/L Final   02/05/2024 110 95 - 110 mmol/L Final     CO2   Date Value Ref Range Status   01/06/2025 20 (L) 23 - 29 mmol/L Final   11/27/2024 22 (L) 23 - 29 mmol/L Final   02/05/2024 20 (L) 23 - 29 mmol/L Final     BUN   Date Value Ref Range Status   01/06/2025 11 6 - 20 mg/dL Final   11/27/2024 10 6 - 20 mg/dL Final   02/05/2024 8 6 - 20 mg/dL Final     Creatinine   Date Value Ref Range Status   01/06/2025 1.2 0.5 - 1.4 mg/dL Final   11/27/2024 1.0 0.5 - 1.4 mg/dL Final   02/05/2024 2.0 (H) 0.5 - 1.4 mg/dL Final     eGFR   Date Value Ref Range Status   01/06/2025 >60 >60 mL/min/1.73 m^2 Final   11/27/2024 >60 >60 mL/min/1.73 m^2 Final   02/05/2024 36 (A) >60 mL/min/1.73 m^2 Final     Calcium   Date Value Ref Range Status   01/06/2025 9.5 8.7 - 10.5 mg/dL Final   11/27/2024 9.3 8.7 - 10.5 mg/dL Final   02/05/2024 8.9 8.7 - 10.5 mg/dL Final     Phosphorus   Date Value Ref Range Status   01/06/2025 2.9 2.7 - 4.5 mg/dL Final   04/09/2019 3.3 2.7 - 4.5 mg/dL Final     Albumin   Date Value Ref Range Status   01/06/2025 3.8 3.5 - 5.2 g/dL Final   11/27/2024 3.6 3.5 - 5.2 g/dL Final   02/04/2024 2.5 (L) 3.5 - 5.2 g/dL Final       Diagnostic Results:      Health Maintenance Due   Topic Date Due    Foot Exam  06/24/2021    Pneumococcal Vaccines (Age 0-49) (1 of 2 - PCV) Never done    COVID-19 Vaccine (3 - 2024-25 season) 09/01/2024         ASSESSMENT & PLAN:   Ms. Sd Zee is a 24 y.o. female     CKD3a - DM related  Start ARB  therapy, 12.5 and monitor BP. 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  Dispense: 45 tablet; Refill: 3  -     empagliflozin (JARDIANCE) 10 mg tablet; Take 1 tablet (10 mg total) by mouth once daily.  Dispense: 90 tablet; Refill: 3        RTC in 1 month      Ridge Mccauley MD  Nephrology [facility]

## 2025-01-13 NOTE — PATIENT INSTRUCTIONS
You have chronic kidney disease, remember that this is a reflection that your kidneys have seen some use rather than a specific process. There are many uncontrollable factors that progress chronic kidney disease - mainly that we use our kidneys 24/7. We will do what we can to prolong the life of your kidney:    Stay plenty hydrated: unless otherwise directed aim for at minimum 2 liters a day  Low salt diet: goal is less than 2 grams of salt daily  Low animal protein diet: greater than 1.3 grams per kilograms bodyweight per day. (I.e if you weigh 100 kilograms (220 lbs) dont exceed greater than 130 grams of protein daily)  Avoid NSAIDS (nonsteroidal anti-inflammatories) like ibuprofen, motrin, goodies powder, advil. Tylenol is okay    Lastly, control of your other medical conditions, weight loss and exercise will always help as well.    We are going to get an ultrasound of your kidneys  I will see you back in 1 month    Remember with the losartan - we are going to get a repeat lab in 1 week  With the jardiance -it can increase your chance at UTIs, if you do get one stop the jardiance and let me know. I will write you an antibiotic. If this medication is not afforable (anything more than 20 dollars even), let me know theres other ones we can try.

## 2025-01-17 ENCOUNTER — TELEPHONE (OUTPATIENT)
Dept: FAMILY MEDICINE | Facility: CLINIC | Age: 25
End: 2025-01-17
Payer: COMMERCIAL

## 2025-01-17 DIAGNOSIS — E10.65 UNCONTROLLED TYPE 1 DIABETES MELLITUS WITH HYPERGLYCEMIA: Primary | ICD-10-CM

## 2025-01-17 RX ORDER — INSULIN GLARGINE 300 [IU]/ML
40 INJECTION, SOLUTION SUBCUTANEOUS NIGHTLY
Qty: 3 ML | Refills: 0 | Status: SHIPPED | OUTPATIENT
Start: 2025-01-17 | End: 2026-01-17

## 2025-01-17 NOTE — TELEPHONE ENCOUNTER
----- Message from Med Assistant Mariel sent at 1/17/2025  8:51 AM CST -----  Type: Patient Call Back    Who called: self    What is the request in detail: pt. States her medication is on back order.. she's asking if something else can be called in.. she's completely out..    insulin glargine U-100, Lantus, (LANTUS SOLOSTAR U-100 INSULIN) 100 unit/mL (3 mL) InPn pen    Can the clinic reply by [facility]VAS[facility]AN?NO    Would the patient rather a call back or a response via [facility]? Yes, call    Best call back number: [phone] (home)

## 2025-01-29 ENCOUNTER — OFFICE VISIT (OUTPATIENT)
Dept: OBSTETRICS AND GYNECOLOGY | Facility: CLINIC | Age: 25
End: 2025-01-29
Payer: COMMERCIAL

## 2025-01-29 DIAGNOSIS — N76.0 ACUTE VAGINITIS: ICD-10-CM

## 2025-01-29 DIAGNOSIS — L73.9 FOLLICULITIS: ICD-10-CM

## 2025-01-29 DIAGNOSIS — N89.8 VAGINAL ODOR: Primary | ICD-10-CM

## 2025-01-29 PROCEDURE — 99999 PR PBB SHADOW E&M-EST. PATIENT-LVL I: CPT | Mod: PBBFAC,,,

## 2025-01-29 PROCEDURE — 87491 CHLMYD TRACH DNA AMP PROBE: CPT

## 2025-01-29 PROCEDURE — 81515 NFCT DS BV&VAGINITIS DNA ALG: CPT

## 2025-01-29 RX ORDER — MUPIROCIN 20 MG/G
OINTMENT TOPICAL 3 TIMES DAILY
Qty: 15 G | Refills: 1 | Status: SHIPPED | OUTPATIENT
Start: 2025-01-29

## 2025-01-29 NOTE — PROGRESS NOTES
Ms Sd Zee  is a 24 y.o.  female G 0 P 0 that presents with complaint of vaginal discharge and vaginal odor for 1  week. She reports mild discharge.   She denies itching, reports odor.  She reports h/o vaginitis. Denies nausea, vomiting, diarrhea, constipation, dysuria, dyspareunia, abdominal pain. She would like STD screening at this visit. No other concerns or complaints at this visit.      Past Medical History:   Diagnosis Date    Diabetes mellitus      History reviewed. No pertinent surgical history.  Social History     Tobacco Use    Smoking status: Never     Passive exposure: Yes    Smokeless tobacco: Never   Substance Use Topics    Alcohol use: No    Drug use: No     Family History   Problem Relation Name Age of Onset    Diabetes Mother Sharon Zee     Stroke Mother Sharon Zee     Heart attacks under age 50 Mother Sharon Zee     Diabetes Maternal Grandfather      Diabetes Paternal Grandmother      Diabetes Maternal Aunt Shiloh Reeves     Arrhythmia Neg Hx      Early death Neg Hx      Pacemaker/defibrilator Neg Hx      Congenital heart disease Neg Hx       OB History    Para Term  AB Living   0 0 0 0 0     SAB IAB Ectopic Multiple Live Births   0 0 0       Obstetric Comments   Gynhx: reg/7   Sexually active @ age 20, one LTP       There were no vitals taken for this visit.    ROS:  GENERAL: No fever, chills, fatigability or weight loss.  VULVAR: No pain, no lesions and denies itching.  VAGINAL: No relaxation, no abnormal bleeding and no lesions. + odor  ABDOMEN: No abdominal pain. Denies nausea. Denies vomiting. No diarrhea. No constipation  BREAST: Denies pain. No lumps. No discharge.  URINARY: No incontinence, no nocturia, no frequency and no dysuria.  CARDIOVASCULAR: No chest pain. No shortness of breath. No leg cramps.  NEUROLOGICAL: No headaches. No vision changes.    PHYSICAL EXAM:  VULVA: +erythema vulva with no masses, tenderness or lesions Approx 1 1.5 mm sized skin-  colored, nontender papules noted to external genitalia    VAGINA: normal appearing vagina with normal color. No abnormal discharge, no lesions   CERVIX: normal appearing cervix without discharge or lesions   UTERUS: uterus is normal size, shape, consistency and nontender   ADNEXA: normal adnexa in size, nontender and no masses    ASSESSMENT and PLAN:    ICD-10-CM ICD-9-CM    1. Vaginal odor  N89.8 625.8 C. trachomatis/N. gonorrhoeae by AMP DNA      Vaginosis Screen by DNA Probe      2. Acute vaginitis  N76.0 616.10 C. trachomatis/N. gonorrhoeae by AMP DNA      Vaginosis Screen by DNA Probe      3. Folliculitis  L73.9 704.8 mupirocin (BACTROBAN) 2 % ointment            Plan:   Prescriptions as noted in orders  Reviewed vulvar/vaginal care and hygiene  Return visit if symptoms persist or progress despite treatment  STD screening    Vaginitis Prevention:  - Avoiding feminine products such as deoderant soaps, body wash, bubble bath, douches, scented toilet paper, deoderant tampons or pads, feminine wipes, chronic pad use, etc. If you wash with soap make sure its hypo allergic (free of added scents or colors)   - Avoiding other vulvovaginal irritants such as long hot baths, humidity, tight, synthetic clothing, chlorine and sitting around in wet bathing suits  - Wearing cotton underwear, avoiding thong underwear and no underwear to bed-wear loose cotton bottoms to bed   - Taking showers instead of baths and use a hair dryer on cool setting afterwards to dry  - Wearing cotton to exercise and shower immediately after exercise and change clothes  - Using polyurethane condoms without spermicide if sexually active and symptoms are triggered by intercourse  - Use laundry detergent without added perfumes or colors   - Do not have sexual intercourse until symptoms have gone away   - Increase your intake of probiotics--you can get these by eating yogurt/greek yogurt or by buying over the counter probiotic supplements     Probiotic  Information:   Any probiotic you choose needs to have ALL of the following components (Each needs to be >10 colony forming units [CFUs]):   - L. Acidophilus  - L. Rhamnosus  - L. Fermentum       FOLLOW UP: PRN lack of improvement.    VAHE Hartman-BC

## 2025-02-04 ENCOUNTER — PATIENT MESSAGE (OUTPATIENT)
Dept: OBSTETRICS AND GYNECOLOGY | Facility: CLINIC | Age: 25
End: 2025-02-04
Payer: COMMERCIAL

## 2025-02-04 DIAGNOSIS — B37.9 YEAST INFECTION: ICD-10-CM

## 2025-02-04 DIAGNOSIS — N76.0 BV (BACTERIAL VAGINOSIS): Primary | ICD-10-CM

## 2025-02-04 DIAGNOSIS — B96.89 BV (BACTERIAL VAGINOSIS): Primary | ICD-10-CM

## 2025-02-04 RX ORDER — FLUCONAZOLE 150 MG/1
150 TABLET ORAL
Qty: 2 TABLET | Refills: 0 | Status: SHIPPED | OUTPATIENT
Start: 2025-02-04

## 2025-02-04 RX ORDER — METRONIDAZOLE 500 MG/1
500 TABLET ORAL 2 TIMES DAILY
Qty: 14 TABLET | Refills: 0 | Status: SHIPPED | OUTPATIENT
Start: 2025-02-04 | End: 2025-02-11

## 2025-02-14 ENCOUNTER — TELEPHONE (OUTPATIENT)
Dept: NEPHROLOGY | Facility: CLINIC | Age: 25
End: 2025-02-14
Payer: COMMERCIAL

## 2025-02-14 NOTE — TELEPHONE ENCOUNTER
I left a message for this patient in reference of follow up scheduled to see Dr. Mccauley in nephrology. Instructed to contact the office at [phone] if assistance is needed with scheduling labs.

## 2025-02-14 NOTE — TELEPHONE ENCOUNTER
Patient returned call to the office and expressed that she is having difficulty with having labs drawn prior to the appointment. She would like to schedule labs the day before the appointment, and the latest time available. Arranged with setting up per patient's preference.

## 2025-02-25 ENCOUNTER — PATIENT MESSAGE (OUTPATIENT)
Dept: NEPHROLOGY | Facility: CLINIC | Age: 25
End: 2025-02-25
Payer: COMMERCIAL

## 2025-03-31 ENCOUNTER — CLINICAL SUPPORT (OUTPATIENT)
Dept: OBSTETRICS AND GYNECOLOGY | Facility: CLINIC | Age: 25
End: 2025-03-31
Payer: COMMERCIAL

## 2025-03-31 VITALS — BODY MASS INDEX: 27.07 KG/M2 | WEIGHT: 148 LBS

## 2025-03-31 DIAGNOSIS — Z30.42 ENCOUNTER FOR MANAGEMENT AND INJECTION OF DEPO-PROVERA: Primary | ICD-10-CM

## 2025-03-31 PROCEDURE — 99999 PR PBB SHADOW E&M-EST. PATIENT-LVL I: CPT | Mod: PBBFAC,,,

## 2025-03-31 PROCEDURE — 96372 THER/PROPH/DIAG INJ SC/IM: CPT | Mod: S$GLB,,,

## 2025-03-31 RX ADMIN — MEDROXYPROGESTERONE ACETATE 150 MG: 150 INJECTION, SUSPENSION INTRAMUSCULAR at 04:03

## 2025-04-03 ENCOUNTER — OFFICE VISIT (OUTPATIENT)
Dept: ENDOCRINOLOGY | Facility: CLINIC | Age: 25
End: 2025-04-03
Payer: COMMERCIAL

## 2025-04-03 VITALS
SYSTOLIC BLOOD PRESSURE: 124 MMHG | DIASTOLIC BLOOD PRESSURE: 90 MMHG | HEART RATE: 93 BPM | WEIGHT: 148.38 LBS | BODY MASS INDEX: 27.14 KG/M2

## 2025-04-03 DIAGNOSIS — E11.65 UNCONTROLLED TYPE 2 DIABETES MELLITUS WITH HYPERGLYCEMIA, WITH LONG-TERM CURRENT USE OF INSULIN: Primary | ICD-10-CM

## 2025-04-03 DIAGNOSIS — E10.21: ICD-10-CM

## 2025-04-03 DIAGNOSIS — E10.65 UNCONTROLLED TYPE 1 DIABETES MELLITUS WITH HYPERGLYCEMIA, WITH LONG-TERM CURRENT USE OF INSULIN: ICD-10-CM

## 2025-04-03 DIAGNOSIS — E10.65 TYPE 1 DIABETES MELLITUS WITH HYPERGLYCEMIA: ICD-10-CM

## 2025-04-03 DIAGNOSIS — Z79.4 UNCONTROLLED TYPE 2 DIABETES MELLITUS WITH HYPERGLYCEMIA, WITH LONG-TERM CURRENT USE OF INSULIN: Primary | ICD-10-CM

## 2025-04-03 DIAGNOSIS — E55.9 VITAMIN D DEFICIENCY: ICD-10-CM

## 2025-04-03 PROCEDURE — 99999 PR PBB SHADOW E&M-EST. PATIENT-LVL IV: CPT | Mod: PBBFAC,,, | Performed by: HOSPITALIST

## 2025-04-03 RX ORDER — INSULIN PUMP SYRINGE, 3 ML
EACH MISCELLANEOUS
Qty: 1 EACH | Refills: 0 | Status: SHIPPED | OUTPATIENT
Start: 2025-04-03

## 2025-04-03 RX ORDER — INSULIN GLARGINE 100 [IU]/ML
40 INJECTION, SOLUTION SUBCUTANEOUS NIGHTLY
Qty: 30 ML | Refills: 6 | Status: SHIPPED | OUTPATIENT
Start: 2025-04-03 | End: 2026-04-03

## 2025-04-03 RX ORDER — INSULIN ASPART 100 [IU]/ML
20 INJECTION, SOLUTION INTRAVENOUS; SUBCUTANEOUS
Qty: 30 ML | Refills: 6 | Status: SHIPPED | OUTPATIENT
Start: 2025-04-03 | End: 2026-04-03

## 2025-04-03 RX ORDER — LANCETS
EACH MISCELLANEOUS
Qty: 300 EACH | Refills: 3 | Status: SHIPPED | OUTPATIENT
Start: 2025-04-03

## 2025-04-03 RX ORDER — BLOOD-GLUCOSE SENSOR
EACH MISCELLANEOUS
Qty: 3 EACH | Refills: 11 | Status: SHIPPED | OUTPATIENT
Start: 2025-04-03

## 2025-04-03 RX ORDER — PEN NEEDLE, DIABETIC 30 GX3/16"
NEEDLE, DISPOSABLE MISCELLANEOUS
Qty: 400 EACH | Refills: 3 | Status: SHIPPED | OUTPATIENT
Start: 2025-04-03

## 2025-04-03 RX ORDER — TIRZEPATIDE 2.5 MG/.5ML
2.5 INJECTION, SOLUTION SUBCUTANEOUS
Qty: 4 PEN | Refills: 6 | Status: SHIPPED | OUTPATIENT
Start: 2025-04-03

## 2025-04-03 RX ORDER — LANCING DEVICE
EACH MISCELLANEOUS
Qty: 1 EACH | Refills: 0 | Status: SHIPPED | OUTPATIENT
Start: 2025-04-03

## 2025-04-03 NOTE — ASSESSMENT & PLAN NOTE
- Diabetes is NOT AT GOAL, as indicated by the most recent A1C reviewed on 4/3/2025.  - Therapy Goals: Discussed the aim to achieve optimal control without causing hypoglycemia, Targeting an A1C of <7%.  - Long-term history, diagnosed at 17 years old, was on insulin off and on.  Poor compliance, A1c elevation >14% 2025, 2024, last A1c at goal was in 2016, since that time A1c poorly control  - Patient reports poor compliant with insulin, skipping mealtime   - Diabetic Supplies and Medications: Reviewed to ensure continued refills and compliance.  - Preventive Care: Advised the patient to schedule periodic eye exams and maintain regular foot care monitoring.  - Annual Monitoring: Reviewed the importance of yearly lipid profile (with statin use) and urine protein/creatinine tests.  - Interestingly: Negative GIDEON/islet cell/antibody.  Previously fluctuation C-peptide.  Possible type 2 diabetes vs ERENDIRA vs type 1 diabetes    - Patient reports she has gotten 2 months without insulin injection in the past     Plan  - Changes:  Discuss about the need for insulin compliance, the need for better glycemic control.  She is aware she needs to get her glucose better.  She is motivated to change  - MDI insulin: Adjustment Lantus 40 units every evening, start Novolog 10 units before meals three times a day, plus sliding scale ISF >150 ISF 1:50  - Start patient on GIP: Mounjaro 2.5 mg once a week injection, cost may be an issue  - Discuss patient may benefit from insulin pump therapy in the future: Like Omnipod  - Dietary Modifications: Encouraged portion size control and reduction of carbohydrate intake to better manage diabetes. >>Referred to diabetes education  - Candidate for CGM use given regular insulin injections daily:  Dexcom G7, sent to the local pharmacy/supply company.  - Hypoglycemia Management: Discussed symptoms and treatment of hypoglycemia. The patient is informed; provided a monitoring/treatment handout.  - Clear  written instructions provided on AVS. Follow-up scheduled within the next 3-4 months with lab work prior.   - Appointment date and time were provided to the patient today.

## 2025-04-03 NOTE — PATIENT INSTRUCTIONS
Lantus 40 units every evening  Novolog 10 units before meals three times a day,    Plus sliding scale  Blood sugar 150 to 200, + 1 unit  Blood sugar 201 to 250, + 2 units  Blood sugar 251 to 300, + 3 units  Blood sugar 301 to 350, + 4 units   Blood sugar greater than 350, + 5 units

## 2025-04-03 NOTE — ASSESSMENT & PLAN NOTE
- Lab work reviewed, and discussed with patient in clinic  - Vitamin-D goal greater than >30  - lab work every 6 mo or yearly

## 2025-04-03 NOTE — PROGRESS NOTES
"Subjective:      Patient ID: Sd Zee is a 24 y.o. female presented to [facility] today.  Chief complaint:  Diabetes      History of Present illness: Sd Zee is a 24 y.o. female here for  uncontrolled diabetes  Other significant past medical history: no  Current PCP Marge Last MD    Interval history:  Patient new to Endocrine, here for evaluation and management of diabetes   Long-term history, diagnosed at 17 years old, was on insulin off and on.  Poor compliance, A1c elevation >14% 2025, 2024, last A1c at goal was in 2016, since that time A1c poorly control  Patient reports poor compliant with insulin, currently on Lantus 50 units QHS   On NovoLog sliding scale      Diabetes mellitus  - Known diabetic complications: nephropathy  - Diagnosed w/ DM: in at 17 years old, was given insulin   - Diabetes Education: No  - Family history of diabetes: Yes  - Hx of pancreatitis/Diabetes Related Hospitalization:  None  - Was on insulin pump in the past, but stop as she "like the shot better"  - no hospital admission for DKA, does note to have hyperglycemia on ED visits.   - Interestingly: Negative GIDEON/islet cell/antibody.  Previously fluctuation C-peptide.  Patient reports she has gotten 2 months without insulin injection in the past   - working. Patient miss her NovoLog injections regularly at this time    Current reported meds:               Novolog around 10 units with each meal   Lantus 50 units nightly   Jardiance 10 Mg too expensive not yet started  Previous meds tried:   Insulin pump  Home glucose checks:  Not checking glucose regularly    Diet/Exercise:   - Eating 2-3 meals per day, not monitoring diet   - Exercise:  Not very active  - Weight trend: stable  - Occupation: working    Health maintenance/prevention:  - Aware a treatment plan for hypoglycemia  - Statin: Taking  - ACE/ARB: Taking  - Urine microalbumin: yearly>reviewed/done  - Eye exam current: yes, yearly, " "DR: unknown  - Check feet regularly, denies cuts or ulcers on feet  - SHANNAN: No, History of obstructive sleep apnea    Diabetes lab work  Lab Results   Component Value Date    HGBA1C >14.0 (H) 01/06/2025    HGBA1C >14.0 (H) 11/27/2024    HGBA1C >14.0 (H) 02/02/2024     Lab Results   Component Value Date    CPEPTIDE 0.77 (L) 09/24/2019    CPEPTIDE 1.7 10/28/2014    GLUTAMICACID 0.00 04/18/2016    GLUTAMICACID 0.00 10/28/2014    ISLETCELLANT <5 04/18/2016    ISLETCELLANT <5 10/28/2014     Random serum glucose:   Lab Results   Component Value Date     (HH) 01/06/2025     (H) 11/27/2024     No results found for: "FRUCTOSAMINE", "GLYCOMARKTM"  Lab Results   Component Value Date    MICALBCREAT 252.9 (H) 01/06/2025    MICALBCREAT 270.1 (H) 11/27/2024    UTPCR 0.38 (H) 01/06/2025     Diabetes Management Status: Reviewed this office visit  Screening or Prevention Patient's value Goal Complete/Controlled?   Lipid profile : 11/27/2024 Annually Yes   Dilated retinal exam : 11/27/2024 Annually Yes   Foot exam   Most Recent Foot Exam Date: Not Found Annually No       The patient's medications, allergies, past medical, surgical, social and family histories were reviewed and updated as appropriate.  Review of Systems: pertinent positives as per the above HPI, and otherwise negative.    Objective:   BP (!) 124/90   Pulse 93   Wt 67.3 kg (148 lb 6.4 oz)   BMI 27.14 kg/m²   Body mass index is 27.14 kg/m².  Vital signs reviewed    Physical Exam  Vitals and nursing note reviewed.   Constitutional:       Appearance: Normal appearance. She is well-developed. She is not ill-appearing.   Neck:      Thyroid: No thyromegaly.   Pulmonary:      Effort: Pulmonary effort is normal. No respiratory distress.   Musculoskeletal:         General: Normal range of motion.      Cervical back: Normal range of motion.   Neurological:      General: No focal deficit present.      Mental Status: She is alert. Mental status is at baseline. " "  Psychiatric:         Mood and Affect: Mood normal.         Behavior: Behavior normal.       Labs reviewed:  See results in subjective  Lab Results   Component Value Date    HGBA1C >14.0 (H) 01/06/2025     Lab Results   Component Value Date    CHOL 213 (H) 11/27/2024    HDL 65 11/27/2024    LDLCALC 135.8 11/27/2024    TRIG 61 11/27/2024    CHOLHDL 30.5 11/27/2024     Lab Results   Component Value Date     (L) 01/06/2025    K 4.5 01/06/2025     01/06/2025    CO2 20 (L) 01/06/2025    BUN 11 01/06/2025    CREATININE 1.2 01/06/2025    CALCIUM 9.5 01/06/2025    PHOS 2.9 01/06/2025    PROT 7.0 11/27/2024    ALBUMIN 3.8 01/06/2025    BILITOT 0.3 11/27/2024    ALKPHOS 76 11/27/2024    AST 15 11/27/2024    ALT 15 11/27/2024    ANIONGAP 9 01/06/2025    EGFRNORACEVR >60 01/06/2025    TSH 2.077 11/27/2024    PTH 30.5 01/06/2025    [id number] 7 (L) 01/06/2025     Assessment     1. Uncontrolled type 2 diabetes mellitus with hyperglycemia, with long-term current use of insulin  DEXCOM G7 SENSOR Marlen    LANTUS SOLOSTAR U-100 INSULIN 100 unit/mL (3 mL) InPn pen    NOVOLOG FLEXPEN U-100 INSULIN 100 unit/mL (3 mL) InPn pen    pen needle, diabetic (BD ULTRA-FINE SHIRLEY PEN NEEDLE) 32 gauge x 5/32" Ndle    MOUNJARO 2.5 mg/0.5 mL PnIj    lancing device Misc    lancets Misc    blood-glucose meter kit    blood sugar diagnostic Strp    Ambulatory referral/consult to Diabetes Education    Hemoglobin A1C      2. Type 1 diabetes mellitus with hyperglycemia  Ambulatory referral/consult to Endocrinology      3. 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 Uncontrolled type 1 diabetes mellitus with hyperglycemia, with long-term current use of insulin  DEXCOM G7 SENSOR Marlen    Hemoglobin A1C    Renal Function Panel    Glutamic Acid Decarboxylase    C-Peptide    Anti-islet cell antibody      5. Vitamin D deficiency           Plan     Uncontrolled type 2 diabetes mellitus with hyperglycemia, with " long-term current use of insulin  - Diabetes is NOT AT GOAL, as indicated by the most recent A1C reviewed on 4/3/2025.  - Therapy Goals: Discussed the aim to achieve optimal control without causing hypoglycemia, Targeting an A1C of <7%.  - Long-term history, diagnosed at 17 years old, was on insulin off and on.  Poor compliance, A1c elevation >14% 2025, 2024, last A1c at goal was in 2016, since that time A1c poorly control  - Patient reports poor compliant with insulin, skipping mealtime   - Diabetic Supplies and Medications: Reviewed to ensure continued refills and compliance.  - Preventive Care: Advised the patient to schedule periodic eye exams and maintain regular foot care monitoring.  - Annual Monitoring: Reviewed the importance of yearly lipid profile (with statin use) and urine protein/creatinine tests.  - Interestingly: Negative GIDEON/islet cell/antibody.  Previously fluctuation C-peptide.  Possible type 2 diabetes vs ERENDIRA vs type 1 diabetes    - Patient reports she has gotten 2 months without insulin injection in the past     Plan  - Changes:  Discuss about the need for insulin compliance, the need for better glycemic control.  She is aware she needs to get her glucose better.  She is motivated to change  - MDI insulin: Adjustment Lantus 40 units every evening, start Novolog 10 units before meals three times a day, plus sliding scale ISF >150 ISF 1:50  - Start patient on GIP: Mounjaro 2.5 mg once a week injection, cost may be an issue  - Discuss patient may benefit from insulin pump therapy in the future: Like Omnipod  - Dietary Modifications: Encouraged portion size control and reduction of carbohydrate intake to better manage diabetes. >>Referred to diabetes education  - Candidate for CGM use given regular insulin injections daily:  Dexcom G7, sent to the local pharmacy/supply company.  - Hypoglycemia Management: Discussed symptoms and treatment of hypoglycemia. The patient is informed; provided a  monitoring/treatment handout.  - Clear written instructions provided on AVS. Follow-up scheduled within the next 3-4 months with lab work prior.   - Appointment date and time were provided to the patient today.     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   RTC in 3-4 months    Jose Villarreal M.D.  Endocrinology  [facility]  4/3/2025      Disclaimer: This note has been generated in part with the use of voice-recognition software. There may be typographical errors that have been missed during proof-reading.

## 2025-04-07 ENCOUNTER — PATIENT MESSAGE (OUTPATIENT)
Dept: ENDOCRINOLOGY | Facility: CLINIC | Age: 25
End: 2025-04-07
Payer: COMMERCIAL

## 2025-04-11 ENCOUNTER — PATIENT MESSAGE (OUTPATIENT)
Dept: NEPHROLOGY | Facility: CLINIC | Age: 25
End: 2025-04-11
Payer: COMMERCIAL

## 2025-04-22 ENCOUNTER — LAB VISIT (OUTPATIENT)
Dept: LAB | Facility: HOSPITAL | Age: 25
End: 2025-04-22
Attending: STUDENT IN AN ORGANIZED HEALTH CARE EDUCATION/TRAINING PROGRAM
Payer: COMMERCIAL

## 2025-04-22 ENCOUNTER — OFFICE VISIT (OUTPATIENT)
Dept: FAMILY MEDICINE | Facility: CLINIC | Age: 25
End: 2025-04-22
Payer: COMMERCIAL

## 2025-04-22 VITALS
OXYGEN SATURATION: 99 % | TEMPERATURE: 98 F | SYSTOLIC BLOOD PRESSURE: 118 MMHG | HEIGHT: 62 IN | DIASTOLIC BLOOD PRESSURE: 86 MMHG | BODY MASS INDEX: 29.01 KG/M2 | HEART RATE: 95 BPM | WEIGHT: 157.63 LBS

## 2025-04-22 DIAGNOSIS — Z00.00 ANNUAL PHYSICAL EXAM: Primary | ICD-10-CM

## 2025-04-22 DIAGNOSIS — N18.31 STAGE 3A CHRONIC KIDNEY DISEASE: ICD-10-CM

## 2025-04-22 LAB
ALBUMIN SERPL BCP-MCNC: 4 G/DL (ref 3.5–5.2)
ANION GAP (OHS): 11 MMOL/L (ref 8–16)
BACTERIA #/AREA URNS AUTO: NORMAL /HPF
BILIRUB UR QL STRIP.AUTO: NEGATIVE
BUN SERPL-MCNC: 14 MG/DL (ref 6–20)
CALCIUM SERPL-MCNC: 10 MG/DL (ref 8.7–10.5)
CHLORIDE SERPL-SCNC: 103 MMOL/L (ref 95–110)
CLARITY UR: CLEAR
CO2 SERPL-SCNC: 23 MMOL/L (ref 23–29)
COLOR UR AUTO: YELLOW
CREAT SERPL-MCNC: 1 MG/DL (ref 0.5–1.4)
CREAT UR-MCNC: 274.1 MG/DL (ref 15–325)
GFR SERPLBLD CREATININE-BSD FMLA CKD-EPI: >60 ML/MIN/1.73/M2
GLUCOSE SERPL-MCNC: 171 MG/DL (ref 70–110)
GLUCOSE UR QL STRIP: ABNORMAL
HGB UR QL STRIP: NEGATIVE
KETONES UR QL STRIP: NEGATIVE
LEUKOCYTE ESTERASE UR QL STRIP: NEGATIVE
MICROSCOPIC COMMENT: NORMAL
NITRITE UR QL STRIP: NEGATIVE
PH UR STRIP: 6 [PH]
PHOSPHATE SERPL-MCNC: 4.5 MG/DL (ref 2.7–4.5)
POTASSIUM SERPL-SCNC: 4.1 MMOL/L (ref 3.5–5.1)
PROT UR QL STRIP: ABNORMAL
PROT UR-MCNC: 138 MG/DL
PROT/CREAT UR: 0.5 MG/G{CREAT}
RBC #/AREA URNS AUTO: 1 /HPF (ref 0–4)
SODIUM SERPL-SCNC: 137 MMOL/L (ref 136–145)
SP GR UR STRIP: >=1.03
SQUAMOUS #/AREA URNS AUTO: 4 /HPF
UROBILINOGEN UR STRIP-ACNC: NEGATIVE EU/DL
WBC #/AREA URNS AUTO: 1 /HPF (ref 0–5)
YEAST UR QL AUTO: NORMAL /HPF

## 2025-04-22 PROCEDURE — 36415 COLL VENOUS BLD VENIPUNCTURE: CPT | Mod: PO

## 2025-04-22 PROCEDURE — 3008F BODY MASS INDEX DOCD: CPT | Mod: CPTII,S$GLB,, | Performed by: INTERNAL MEDICINE

## 2025-04-22 PROCEDURE — 82570 ASSAY OF URINE CREATININE: CPT

## 2025-04-22 PROCEDURE — 99395 PREV VISIT EST AGE 18-39: CPT | Mod: S$GLB,,, | Performed by: INTERNAL MEDICINE

## 2025-04-22 PROCEDURE — 3060F POS MICROALBUMINURIA REV: CPT | Mod: CPTII,S$GLB,, | Performed by: INTERNAL MEDICINE

## 2025-04-22 PROCEDURE — 3074F SYST BP LT 130 MM HG: CPT | Mod: CPTII,S$GLB,, | Performed by: INTERNAL MEDICINE

## 2025-04-22 PROCEDURE — 3046F HEMOGLOBIN A1C LEVEL >9.0%: CPT | Mod: CPTII,S$GLB,, | Performed by: INTERNAL MEDICINE

## 2025-04-22 PROCEDURE — 81001 URINALYSIS AUTO W/SCOPE: CPT

## 2025-04-22 PROCEDURE — 3066F NEPHROPATHY DOC TX: CPT | Mod: CPTII,S$GLB,, | Performed by: INTERNAL MEDICINE

## 2025-04-22 PROCEDURE — 80069 RENAL FUNCTION PANEL: CPT

## 2025-04-22 PROCEDURE — 99999 PR PBB SHADOW E&M-EST. PATIENT-LVL IV: CPT | Mod: PBBFAC,,, | Performed by: INTERNAL MEDICINE

## 2025-04-22 PROCEDURE — 4010F ACE/ARB THERAPY RXD/TAKEN: CPT | Mod: CPTII,S$GLB,, | Performed by: INTERNAL MEDICINE

## 2025-04-22 PROCEDURE — 3079F DIAST BP 80-89 MM HG: CPT | Mod: CPTII,S$GLB,, | Performed by: INTERNAL MEDICINE

## 2025-04-22 PROCEDURE — 1159F MED LIST DOCD IN RCRD: CPT | Mod: CPTII,S$GLB,, | Performed by: INTERNAL MEDICINE

## 2025-04-22 PROCEDURE — 1160F RVW MEDS BY RX/DR IN RCRD: CPT | Mod: CPTII,S$GLB,, | Performed by: INTERNAL MEDICINE

## 2025-04-22 NOTE — PROGRESS NOTES
SUBJECTIVE     Chief Complaint   Patient presents with    Annual Exam       HPI  Sd Zee is a 24 y.o. female with multiple medical diagnoses as listed in the medical history and problem list that presents for annual exam. Pt has been doing well since her last visit. She has a good appetite and eats well. She does exercise in the gym 3-4 times per week. She sleeps for ~7 hours nightly. Pt does not take OTC supplements. She does not have any current stressors. Pt is UTD on age appropriate CA screening.    PAST MEDICAL HISTORY:  Past Medical History:   Diagnosis Date    Diabetes mellitus        PAST SURGICAL HISTORY:  History reviewed. No pertinent surgical history.    SOCIAL HISTORY:  Social History[1]    FAMILY HISTORY:  Family History   Problem Relation Name Age of Onset    Diabetes Mother Sharon Zee     Stroke Mother Sharon Zee     Heart attacks under age 50 Mother Sharon Zee     Diabetes Maternal Grandfather      Diabetes Paternal Grandmother      Diabetes Maternal Aunt Shiloh Reeves     Arrhythmia Neg Hx      Early death Neg Hx      Pacemaker/defibrilator Neg Hx      Congenital heart disease Neg Hx         ALLERGIES AND MEDICATIONS: updated and reviewed.  Review of patient's allergies indicates:  No Known Allergies  Current Medications[2]    ROS  Review of Systems   Constitutional:  Negative for chills and fever.   HENT:  Negative for hearing loss and sore throat.    Eyes:  Negative for visual disturbance.   Respiratory:  Negative for cough and shortness of breath.    Cardiovascular:  Negative for chest pain, palpitations and leg swelling.   Gastrointestinal:  Negative for abdominal pain, constipation, diarrhea, nausea and vomiting.   Genitourinary:  Negative for dysuria, frequency and urgency.   Musculoskeletal:  Negative for arthralgias, joint swelling and myalgias.   Skin:  Negative for rash and wound.   Neurological:  Negative for headaches.   Psychiatric/Behavioral:  Negative for agitation  "and confusion. The patient is not nervous/anxious.          OBJECTIVE     Physical Exam  Vitals:    04/22/25 1438   BP: 118/86   Pulse: 95   Temp: 97.9 °F (36.6 °C)    Body mass index is 28.83 kg/m².  Weight: 71.5 kg (157 lb 10.1 oz)   Height: 5' 2" (157.5 cm)     Physical Exam  Constitutional:       General: She is not in acute distress.     Appearance: She is well-developed.   HENT:      Head: Normocephalic and atraumatic.      Right Ear: External ear normal.      Left Ear: External ear normal.      Nose: Nose normal.   Eyes:      General: No scleral icterus.        Right eye: No discharge.         Left eye: No discharge.      Conjunctiva/sclera: Conjunctivae normal.   Neck:      Vascular: No JVD.      Trachea: No tracheal deviation.   Cardiovascular:      Rate and Rhythm: Normal rate and regular rhythm.      Heart sounds: No murmur heard.     No friction rub. No gallop.   Pulmonary:      Effort: Pulmonary effort is normal. No respiratory distress.      Breath sounds: Normal breath sounds. No wheezing.   Abdominal:      General: Bowel sounds are normal. There is no distension.      Palpations: Abdomen is soft. There is no mass.      Tenderness: There is no abdominal tenderness. There is no guarding or rebound.   Musculoskeletal:         General: No tenderness or deformity. Normal range of motion.      Cervical back: Normal range of motion and neck supple.   Skin:     General: Skin is warm and dry.      Findings: No erythema or rash.   Neurological:      Mental Status: She is alert and oriented to person, place, and time.      Motor: No abnormal muscle tone.      Coordination: Coordination normal.   Psychiatric:         Behavior: Behavior normal.         Thought Content: Thought content normal.         Judgment: Judgment normal.           Health Maintenance         Date Due Completion Date    Foot Exam 06/24/2021 6/24/2020 (Done)    Override on 6/24/2020: Done (Exam documented in clinic note 6/24/2020)    " Pneumococcal Vaccines (Age 0-49) (1 of 2 - PCV) Never done ---    COVID-19 Vaccine (3 - 2024-25 season) 09/01/2024 5/4/2021    Hemoglobin A1c 04/06/2025 1/6/2025    Lipid Panel 11/27/2025 11/27/2024    Diabetic Eye Exam 11/27/2025 11/27/2024    Override on 3/16/2020: Done (Parental report, Dr. Nolasco in [place])    Diabetes Urine Screening 01/06/2026 1/6/2025    Chlamydia Screening 01/29/2026 1/29/2025    Pap Smear 06/14/2026 6/14/2023    TETANUS VACCINE 04/17/2034 4/17/2024    RSV Vaccine (Age 60+ and Pregnant patients) (1 - 1-dose 75+ series) 07/12/2077 ---              ASSESSMENT     24 y.o. female with     1. Annual physical exam        PLAN:     1. Annual physical exam  - Counseled on age appropriate medical preventative services, including age appropriate cancer screenings, over all nutritional health, need for a consistent exercise regimen and an over all push towards maintaining a vigorous and active lifestyle.  Counseled on age appropriate vaccines and discussed upcoming health care needs based on age/gender.  Spent time with patient counseling on need for a good patient/doctor relationship moving forward.  Discussed use of common OTC medications and supplements.  Discussed common dietary aids and use of caffeine and the need for good sleep hygiene and stress management.        RTC in 6 months     Marge Last MD  04/22/2025 3:13 PM        No follow-ups on file.                       [1]   Social History  Socioeconomic History    Marital status: Single   Tobacco Use    Smoking status: Never     Passive exposure: Yes    Smokeless tobacco: Never   Substance and Sexual Activity    Alcohol use: No    Drug use: No    Sexual activity: Yes     Partners: Male     Birth control/protection: Condom, Injection   Social History Narrative    Together since approx 1/2024    Lives at home with mom and stepfather.    Has siblings that do not live with her, but they are healthy per report.    Works as an associate at  [employer]     Social Drivers of Health     Financial Resource Strain: Low Risk  (4/22/2025)    Overall Financial Resource Strain (CARDIA)     Difficulty of Paying Living Expenses: Not hard at all   Food Insecurity: No Food Insecurity (4/22/2025)    Hunger Vital Sign     Worried About Running Out of Food in the Last Year: Never true     Ran Out of Food in the Last Year: Never true   Transportation Needs: No Transportation Needs (4/22/2025)    PRAPARE - Transportation     Lack of Transportation (Medical): No     Lack of Transportation (Non-Medical): No   Physical Activity: Insufficiently Active (4/22/2025)    Exercise Vital Sign     Days of Exercise per Week: 2 days     Minutes of Exercise per Session: 30 min   Stress: No Stress Concern Present (4/22/2025)    [place] [employer] - Occupational Stress Questionnaire     Feeling of Stress : Not at all   Housing Stability: Low Risk  (4/22/2025)    Housing Stability Vital Sign     Unable to Pay for Housing in the Last Year: No     Number of Times Moved in the Last Year: 0     Homeless in the Last Year: No   [2]   Current Outpatient Medications   Medication Sig Dispense Refill    blood sugar diagnostic Strp Dispense: Test strip to check glucose 3 times a day, ICD-10:E10.65, compatible with insurance/glucometer, dispense enough for 90 day supply 300 each 3    blood-glucose meter kit Dispense: 1 glucometer, use to check glucose 3 times a day, ICD-10:E10.65,  Dispense machine covered by insurance 1 each 0    DEXCOM G7 SENSOR Marlen Use 1 sensor every 10 days to track blood glucose, ICD10: E11.65, okay with 90 day supply if possible 3 each 11    empagliflozin (JARDIANCE) 10 mg tablet Take 1 tablet (10 mg total) by mouth once daily. 90 tablet 3    lancets Misc Dispense: Lancets use to check glucose  3 times a day, ICD-10: E10.65, dispense enough for 90 day supply 300 each 3    lancing device Misc One device, used to check blood glucose, ICD-10:  "E10.65(1) 1 each 0    LANTUS SOLOSTAR U-100 INSULIN 100 unit/mL (3 mL) InPn pen Inject 40 Units into the skin every evening. 30 mL 6    losartan (COZAAR) 25 MG tablet Take 0.5 tablets (12.5 mg total) by mouth once daily. 45 tablet 3    metFORMIN (GLUCOPHAGE) 500 MG tablet Take 1 tablet (500 mg total) by mouth 2 (two) times daily with meals. 180 tablet 1    NOVOLOG FLEXPEN U-100 INSULIN 100 unit/mL (3 mL) InPn pen Inject 20 Units into the skin 3 (three) times daily with meals. 30 mL 6    pen needle, diabetic (BD ULTRA-FINE SHIRLEY PEN NEEDLE) 32 gauge x 5/32" Ndle Use needles to inject insulin 4 times a day. ICD-10: E11.65, dispense enough for 90 day supply 400 each 3    pravastatin (PRAVACHOL) 20 MG tablet Take 1 tablet (20 mg total) by mouth once daily. 90 tablet 3    MOUNJARO 2.5 mg/0.5 mL PnIj Inject 2.5 mg into the skin every 7 days. (Patient not taking: Reported on 4/22/2025) 4 Pen 6     Current Facility-Administered Medications   Medication Dose Route Frequency Provider Last Rate Last Admin    medroxyPROGESTERone (DEPO-PROVERA) injection 150 mg  150 mg Intramuscular Q90 Days Khadijah Kwon NP   150 mg at 03/31/25 1614     "

## 2025-06-18 DIAGNOSIS — N18.31 STAGE 3A CHRONIC KIDNEY DISEASE: Primary | ICD-10-CM

## 2025-06-20 ENCOUNTER — TELEPHONE (OUTPATIENT)
Dept: NEPHROLOGY | Facility: CLINIC | Age: 25
End: 2025-06-20

## 2025-06-20 ENCOUNTER — PATIENT MESSAGE (OUTPATIENT)
Dept: NEPHROLOGY | Facility: CLINIC | Age: 25
End: 2025-06-20

## 2025-06-20 NOTE — TELEPHONE ENCOUNTER
I left a message for this patient in reference of upcoming appointment in nephrology with Dr. Mccauley. Instructed to complete updated labs prior to the appointment. Contact office number, [phone]

## 2025-06-24 ENCOUNTER — LAB VISIT (OUTPATIENT)
Dept: LAB | Facility: HOSPITAL | Age: 25
End: 2025-06-24
Attending: STUDENT IN AN ORGANIZED HEALTH CARE EDUCATION/TRAINING PROGRAM

## 2025-06-24 DIAGNOSIS — N18.31 STAGE 3A CHRONIC KIDNEY DISEASE: ICD-10-CM

## 2025-06-24 LAB
ALBUMIN SERPL BCP-MCNC: 3.5 G/DL (ref 3.5–5.2)
ANION GAP (OHS): 13 MMOL/L (ref 8–16)
BUN SERPL-MCNC: 13 MG/DL (ref 6–20)
CALCIUM SERPL-MCNC: 9 MG/DL (ref 8.7–10.5)
CHLORIDE SERPL-SCNC: 99 MMOL/L (ref 95–110)
CO2 SERPL-SCNC: 20 MMOL/L (ref 23–29)
CREAT SERPL-MCNC: 1.3 MG/DL (ref 0.5–1.4)
GFR SERPLBLD CREATININE-BSD FMLA CKD-EPI: 60 ML/MIN/1.73/M2
GLUCOSE SERPL-MCNC: 507 MG/DL (ref 70–110)
PHOSPHATE SERPL-MCNC: 3.5 MG/DL (ref 2.7–4.5)
POTASSIUM SERPL-SCNC: 4.4 MMOL/L (ref 3.5–5.1)
SODIUM SERPL-SCNC: 132 MMOL/L (ref 136–145)

## 2025-06-24 PROCEDURE — 36415 COLL VENOUS BLD VENIPUNCTURE: CPT

## 2025-06-24 PROCEDURE — 84132 ASSAY OF SERUM POTASSIUM: CPT

## 2025-06-25 ENCOUNTER — CLINICAL SUPPORT (OUTPATIENT)
Dept: OBSTETRICS AND GYNECOLOGY | Facility: CLINIC | Age: 25
End: 2025-06-25

## 2025-06-25 DIAGNOSIS — Z30.42 ENCOUNTER FOR MANAGEMENT AND INJECTION OF DEPO-PROVERA: Primary | ICD-10-CM

## 2025-06-25 PROCEDURE — 99999PBSHW PR PBB SHADOW TECHNICAL ONLY FILED TO HB: Mod: PBBFAC,,,

## 2025-06-25 PROCEDURE — 96372 THER/PROPH/DIAG INJ SC/IM: CPT | Mod: PBBFAC

## 2025-06-25 PROCEDURE — 99999 PR PBB SHADOW E&M-EST. PATIENT-LVL II: CPT | Mod: PBBFAC,,,

## 2025-06-25 RX ADMIN — MEDROXYPROGESTERONE ACETATE 150 MG: 150 INJECTION, SUSPENSION INTRAMUSCULAR at 09:06

## 2025-06-25 NOTE — PROGRESS NOTES
Depo-provera injection administered per MAR without difficutly. Pt instructed to sit in waiting room for 15 minutes to monitor for s/s of adverse reaction. Next appointment made, stressed importance of staying within chacho period. Pt verb understanding

## 2025-07-10 ENCOUNTER — PATIENT MESSAGE (OUTPATIENT)
Dept: ADMINISTRATIVE | Facility: HOSPITAL | Age: 25
End: 2025-07-10